# Patient Record
Sex: MALE | Race: WHITE | NOT HISPANIC OR LATINO | Employment: OTHER | URBAN - METROPOLITAN AREA
[De-identification: names, ages, dates, MRNs, and addresses within clinical notes are randomized per-mention and may not be internally consistent; named-entity substitution may affect disease eponyms.]

---

## 2021-05-25 ENCOUNTER — RECORDS - HEALTHEAST (OUTPATIENT)
Dept: ADMINISTRATIVE | Facility: CLINIC | Age: 71
End: 2021-05-25

## 2021-06-16 PROBLEM — F17.200 NICOTINE DEPENDENCE, UNCOMPLICATED: Status: ACTIVE | Noted: 2017-02-10

## 2021-06-16 PROBLEM — K85.20 ALCOHOL-INDUCED ACUTE PANCREATITIS WITHOUT INFECTION OR NECROSIS: Status: ACTIVE | Noted: 2017-02-10

## 2021-06-16 PROBLEM — G47.00 INSOMNIA: Status: ACTIVE | Noted: 2018-07-23

## 2021-06-16 PROBLEM — R94.31 ABNORMAL ELECTROCARDIOGRAM: Status: ACTIVE | Noted: 2017-02-10

## 2021-06-16 PROBLEM — F41.9 ANXIETY: Status: ACTIVE | Noted: 2018-07-23

## 2021-06-16 PROBLEM — F10.20 ALCOHOL USE DISORDER, SEVERE, DEPENDENCE (H): Status: ACTIVE | Noted: 2018-07-23

## 2021-07-14 PROBLEM — N17.9 AKI (ACUTE KIDNEY INJURY) (H): Status: RESOLVED | Noted: 2018-07-23 | Resolved: 2018-08-06

## 2021-08-03 PROBLEM — F10.951 ALCOHOL HALLUCINOSIS (H): Status: RESOLVED | Noted: 2018-07-23 | Resolved: 2018-08-06

## 2021-09-01 ENCOUNTER — TRANSFERRED RECORDS (OUTPATIENT)
Dept: HEALTH INFORMATION MANAGEMENT | Facility: CLINIC | Age: 71
End: 2021-09-01

## 2021-09-01 LAB — RETINOPATHY: NORMAL

## 2022-05-19 ENCOUNTER — APPOINTMENT (OUTPATIENT)
Dept: CT IMAGING | Facility: HOSPITAL | Age: 72
End: 2022-05-19
Attending: EMERGENCY MEDICINE
Payer: MEDICARE

## 2022-05-19 ENCOUNTER — TRANSFERRED RECORDS (OUTPATIENT)
Dept: HEALTH INFORMATION MANAGEMENT | Facility: CLINIC | Age: 72
End: 2022-05-19

## 2022-05-19 ENCOUNTER — HOSPITAL ENCOUNTER (OUTPATIENT)
Facility: HOSPITAL | Age: 72
Setting detail: OBSERVATION
Discharge: HOME | End: 2022-05-22
Attending: EMERGENCY MEDICINE | Admitting: INTERNAL MEDICINE
Payer: MEDICARE

## 2022-05-19 DIAGNOSIS — E87.1 HYPONATREMIA: ICD-10-CM

## 2022-05-19 DIAGNOSIS — R33.9 URINARY RETENTION: ICD-10-CM

## 2022-05-19 DIAGNOSIS — K92.2 GASTROINTESTINAL HEMORRHAGE, UNSPECIFIED GASTROINTESTINAL HEMORRHAGE TYPE: ICD-10-CM

## 2022-05-19 DIAGNOSIS — K86.9 DIABETES MELLITUS ASSOCIATED WITH PANCREATIC DISEASE (H): Primary | ICD-10-CM

## 2022-05-19 DIAGNOSIS — E11.69 DIABETES MELLITUS ASSOCIATED WITH PANCREATIC DISEASE (H): Primary | ICD-10-CM

## 2022-05-19 DIAGNOSIS — R55 SYNCOPE, UNSPECIFIED SYNCOPE TYPE: ICD-10-CM

## 2022-05-19 PROBLEM — E11.9 TYPE 2 DIABETES MELLITUS (H): Status: ACTIVE | Noted: 2017-06-03

## 2022-05-19 LAB
ABO/RH(D): NORMAL
ALBUMIN SERPL-MCNC: 3.9 G/DL (ref 3.5–5)
ALBUMIN UR-MCNC: NEGATIVE MG/DL
ALP SERPL-CCNC: 79 U/L (ref 45–120)
ALT SERPL W P-5'-P-CCNC: 12 U/L (ref 0–45)
ANION GAP SERPL CALCULATED.3IONS-SCNC: 11 MMOL/L (ref 5–18)
ANTIBODY SCREEN: NEGATIVE
APPEARANCE UR: CLEAR
APTT PPP: 32 SECONDS (ref 22–38)
AST SERPL W P-5'-P-CCNC: 20 U/L (ref 0–40)
BASOPHILS # BLD AUTO: 0 10E3/UL (ref 0–0.2)
BASOPHILS NFR BLD AUTO: 1 %
BILIRUB DIRECT SERPL-MCNC: 0.3 MG/DL
BILIRUB SERPL-MCNC: 0.6 MG/DL (ref 0–1)
BILIRUB UR QL STRIP: NEGATIVE
BNP SERPL-MCNC: 12 PG/ML (ref 0–67)
BUN SERPL-MCNC: 25 MG/DL (ref 8–28)
CALCIUM SERPL-MCNC: 9.8 MG/DL (ref 8.5–10.5)
CHLORIDE BLD-SCNC: 91 MMOL/L (ref 98–107)
CO2 SERPL-SCNC: 23 MMOL/L (ref 22–31)
COLOR UR AUTO: ABNORMAL
CREAT SERPL-MCNC: 1.54 MG/DL (ref 0.7–1.3)
EOSINOPHIL # BLD AUTO: 0.1 10E3/UL (ref 0–0.7)
EOSINOPHIL NFR BLD AUTO: 3 %
ERYTHROCYTE [DISTWIDTH] IN BLOOD BY AUTOMATED COUNT: 13.4 % (ref 10–15)
GFR SERPL CREATININE-BSD FRML MDRD: 48 ML/MIN/1.73M2
GLUCOSE BLD-MCNC: 109 MG/DL (ref 70–125)
GLUCOSE UR STRIP-MCNC: NEGATIVE MG/DL
HCT VFR BLD AUTO: 40.3 % (ref 40–53)
HEMOCCULT STL QL: POSITIVE
HGB BLD-MCNC: 14.4 G/DL (ref 13.3–17.7)
HGB UR QL STRIP: NEGATIVE
IMM GRANULOCYTES # BLD: 0 10E3/UL
IMM GRANULOCYTES NFR BLD: 1 %
INR PPP: 0.99 (ref 0.85–1.15)
KETONES UR STRIP-MCNC: NEGATIVE MG/DL
LACTATE SERPL-SCNC: 1.2 MMOL/L (ref 0.7–2)
LEUKOCYTE ESTERASE UR QL STRIP: NEGATIVE
LYMPHOCYTES # BLD AUTO: 1.3 10E3/UL (ref 0.8–5.3)
LYMPHOCYTES NFR BLD AUTO: 30 %
MCH RBC QN AUTO: 33 PG (ref 26.5–33)
MCHC RBC AUTO-ENTMCNC: 35.7 G/DL (ref 31.5–36.5)
MCV RBC AUTO: 92 FL (ref 78–100)
MONOCYTES # BLD AUTO: 0.6 10E3/UL (ref 0–1.3)
MONOCYTES NFR BLD AUTO: 14 %
MUCOUS THREADS #/AREA URNS LPF: PRESENT /LPF
NEUTROPHILS # BLD AUTO: 2.3 10E3/UL (ref 1.6–8.3)
NEUTROPHILS NFR BLD AUTO: 51 %
NITRATE UR QL: NEGATIVE
NRBC # BLD AUTO: 0 10E3/UL
NRBC BLD AUTO-RTO: 0 /100
PH UR STRIP: 5.5 [PH] (ref 5–7)
PLATELET # BLD AUTO: 289 10E3/UL (ref 150–450)
POTASSIUM BLD-SCNC: 4.8 MMOL/L (ref 3.5–5)
PROT SERPL-MCNC: 7.5 G/DL (ref 6–8)
RBC # BLD AUTO: 4.37 10E6/UL (ref 4.4–5.9)
RBC URINE: <1 /HPF
SODIUM SERPL-SCNC: 125 MMOL/L (ref 136–145)
SP GR UR STRIP: 1.01 (ref 1–1.03)
SPECIMEN EXPIRATION DATE: NORMAL
TROPONIN I SERPL-MCNC: <0.01 NG/ML (ref 0–0.29)
UROBILINOGEN UR STRIP-MCNC: <2 MG/DL
WBC # BLD AUTO: 4.3 10E3/UL (ref 4–11)
WBC URINE: 1 /HPF

## 2022-05-19 PROCEDURE — C9113 INJ PANTOPRAZOLE SODIUM, VIA: HCPCS | Performed by: EMERGENCY MEDICINE

## 2022-05-19 PROCEDURE — 93005 ELECTROCARDIOGRAM TRACING: CPT | Mod: XU | Performed by: EMERGENCY MEDICINE

## 2022-05-19 PROCEDURE — 99285 EMERGENCY DEPT VISIT HI MDM: CPT | Mod: 25

## 2022-05-19 PROCEDURE — 51798 US URINE CAPACITY MEASURE: CPT

## 2022-05-19 PROCEDURE — 99223 1ST HOSP IP/OBS HIGH 75: CPT | Performed by: HOSPITALIST

## 2022-05-19 PROCEDURE — 70496 CT ANGIOGRAPHY HEAD: CPT

## 2022-05-19 PROCEDURE — 85025 COMPLETE CBC W/AUTO DIFF WBC: CPT | Performed by: EMERGENCY MEDICINE

## 2022-05-19 PROCEDURE — 250N000011 HC RX IP 250 OP 636: Performed by: EMERGENCY MEDICINE

## 2022-05-19 PROCEDURE — 82550 ASSAY OF CK (CPK): CPT | Performed by: HOSPITALIST

## 2022-05-19 PROCEDURE — 72125 CT NECK SPINE W/O DYE: CPT

## 2022-05-19 PROCEDURE — 96361 HYDRATE IV INFUSION ADD-ON: CPT | Mod: XU

## 2022-05-19 PROCEDURE — 71275 CT ANGIOGRAPHY CHEST: CPT

## 2022-05-19 PROCEDURE — 36415 COLL VENOUS BLD VENIPUNCTURE: CPT | Performed by: EMERGENCY MEDICINE

## 2022-05-19 PROCEDURE — 84484 ASSAY OF TROPONIN QUANT: CPT | Performed by: EMERGENCY MEDICINE

## 2022-05-19 PROCEDURE — 250N000013 HC RX MED GY IP 250 OP 250 PS 637: Performed by: EMERGENCY MEDICINE

## 2022-05-19 PROCEDURE — 81001 URINALYSIS AUTO W/SCOPE: CPT | Performed by: EMERGENCY MEDICINE

## 2022-05-19 PROCEDURE — 84300 ASSAY OF URINE SODIUM: CPT | Performed by: HOSPITALIST

## 2022-05-19 PROCEDURE — C9803 HOPD COVID-19 SPEC COLLECT: HCPCS

## 2022-05-19 PROCEDURE — 86850 RBC ANTIBODY SCREEN: CPT | Performed by: EMERGENCY MEDICINE

## 2022-05-19 PROCEDURE — 999N000127 HC STATISTIC PERIPHERAL IV START W US GUIDANCE

## 2022-05-19 PROCEDURE — 96374 THER/PROPH/DIAG INJ IV PUSH: CPT

## 2022-05-19 PROCEDURE — 96375 TX/PRO/DX INJ NEW DRUG ADDON: CPT | Mod: XU

## 2022-05-19 PROCEDURE — 85730 THROMBOPLASTIN TIME PARTIAL: CPT | Performed by: EMERGENCY MEDICINE

## 2022-05-19 PROCEDURE — 82248 BILIRUBIN DIRECT: CPT | Performed by: EMERGENCY MEDICINE

## 2022-05-19 PROCEDURE — G0378 HOSPITAL OBSERVATION PER HR: HCPCS

## 2022-05-19 PROCEDURE — 83735 ASSAY OF MAGNESIUM: CPT | Performed by: HOSPITALIST

## 2022-05-19 PROCEDURE — 258N000003 HC RX IP 258 OP 636: Performed by: EMERGENCY MEDICINE

## 2022-05-19 PROCEDURE — 83935 ASSAY OF URINE OSMOLALITY: CPT | Performed by: HOSPITALIST

## 2022-05-19 PROCEDURE — 80053 COMPREHEN METABOLIC PANEL: CPT | Performed by: EMERGENCY MEDICINE

## 2022-05-19 PROCEDURE — 70498 CT ANGIOGRAPHY NECK: CPT

## 2022-05-19 PROCEDURE — 85610 PROTHROMBIN TIME: CPT | Performed by: EMERGENCY MEDICINE

## 2022-05-19 PROCEDURE — 120N000001 HC R&B MED SURG/OB

## 2022-05-19 PROCEDURE — 82272 OCCULT BLD FECES 1-3 TESTS: CPT | Performed by: EMERGENCY MEDICINE

## 2022-05-19 PROCEDURE — 87635 SARS-COV-2 COVID-19 AMP PRB: CPT | Performed by: EMERGENCY MEDICINE

## 2022-05-19 PROCEDURE — 83880 ASSAY OF NATRIURETIC PEPTIDE: CPT | Performed by: EMERGENCY MEDICINE

## 2022-05-19 PROCEDURE — 83605 ASSAY OF LACTIC ACID: CPT | Performed by: EMERGENCY MEDICINE

## 2022-05-19 PROCEDURE — 250N000011 HC RX IP 250 OP 636: Performed by: STUDENT IN AN ORGANIZED HEALTH CARE EDUCATION/TRAINING PROGRAM

## 2022-05-19 PROCEDURE — 51702 INSERT TEMP BLADDER CATH: CPT

## 2022-05-19 RX ORDER — ACETAMINOPHEN 650 MG/1
650 SUPPOSITORY RECTAL EVERY 6 HOURS PRN
Status: DISCONTINUED | OUTPATIENT
Start: 2022-05-19 | End: 2022-05-22 | Stop reason: HOSPADM

## 2022-05-19 RX ORDER — IOPAMIDOL 755 MG/ML
75 INJECTION, SOLUTION INTRAVASCULAR ONCE
Status: COMPLETED | OUTPATIENT
Start: 2022-05-19 | End: 2022-05-19

## 2022-05-19 RX ORDER — DEXTROSE MONOHYDRATE 25 G/50ML
25-50 INJECTION, SOLUTION INTRAVENOUS
Status: DISCONTINUED | OUTPATIENT
Start: 2022-05-19 | End: 2022-05-22 | Stop reason: HOSPADM

## 2022-05-19 RX ORDER — ONDANSETRON 4 MG/1
4 TABLET, ORALLY DISINTEGRATING ORAL EVERY 6 HOURS PRN
Status: DISCONTINUED | OUTPATIENT
Start: 2022-05-19 | End: 2022-05-22 | Stop reason: HOSPADM

## 2022-05-19 RX ORDER — MORPHINE SULFATE 4 MG/ML
4 INJECTION, SOLUTION INTRAMUSCULAR; INTRAVENOUS ONCE
Status: COMPLETED | OUTPATIENT
Start: 2022-05-19 | End: 2022-05-19

## 2022-05-19 RX ORDER — ALBUTEROL SULFATE 90 UG/1
2 AEROSOL, METERED RESPIRATORY (INHALATION) EVERY 4 HOURS PRN
COMMUNITY
End: 2022-07-08

## 2022-05-19 RX ORDER — ONDANSETRON 2 MG/ML
4 INJECTION INTRAMUSCULAR; INTRAVENOUS
Status: COMPLETED | OUTPATIENT
Start: 2022-05-19 | End: 2022-05-19

## 2022-05-19 RX ORDER — NICOTINE POLACRILEX 4 MG
15-30 LOZENGE BUCCAL
Status: DISCONTINUED | OUTPATIENT
Start: 2022-05-19 | End: 2022-05-22 | Stop reason: HOSPADM

## 2022-05-19 RX ORDER — ENOXAPARIN SODIUM 100 MG/ML
40 INJECTION SUBCUTANEOUS EVERY 24 HOURS
Status: DISCONTINUED | OUTPATIENT
Start: 2022-05-20 | End: 2022-05-20

## 2022-05-19 RX ORDER — SODIUM CHLORIDE 9 MG/ML
INJECTION, SOLUTION INTRAVENOUS CONTINUOUS
Status: ACTIVE | OUTPATIENT
Start: 2022-05-20 | End: 2022-05-20

## 2022-05-19 RX ORDER — DULOXETIN HYDROCHLORIDE 60 MG/1
120 CAPSULE, DELAYED RELEASE ORAL DAILY
COMMUNITY
Start: 2022-02-18 | End: 2023-03-22

## 2022-05-19 RX ORDER — HYDROXYZINE HYDROCHLORIDE 25 MG/1
25-50 TABLET, FILM COATED ORAL EVERY 8 HOURS PRN
COMMUNITY
Start: 2021-11-24 | End: 2022-06-20

## 2022-05-19 RX ORDER — DULOXETIN HYDROCHLORIDE 60 MG/1
120 CAPSULE, DELAYED RELEASE ORAL DAILY
Status: DISCONTINUED | OUTPATIENT
Start: 2022-05-20 | End: 2022-05-22 | Stop reason: HOSPADM

## 2022-05-19 RX ORDER — HYDROCODONE BITARTRATE AND ACETAMINOPHEN 5; 325 MG/1; MG/1
1 TABLET ORAL ONCE
Status: COMPLETED | OUTPATIENT
Start: 2022-05-19 | End: 2022-05-19

## 2022-05-19 RX ORDER — ONDANSETRON 2 MG/ML
4 INJECTION INTRAMUSCULAR; INTRAVENOUS EVERY 6 HOURS PRN
Status: DISCONTINUED | OUTPATIENT
Start: 2022-05-19 | End: 2022-05-22 | Stop reason: HOSPADM

## 2022-05-19 RX ORDER — HYDROXYZINE HYDROCHLORIDE 25 MG/1
25-50 TABLET, FILM COATED ORAL EVERY 8 HOURS PRN
Status: DISCONTINUED | OUTPATIENT
Start: 2022-05-19 | End: 2022-05-22 | Stop reason: HOSPADM

## 2022-05-19 RX ORDER — ATORVASTATIN CALCIUM 40 MG/1
40 TABLET, FILM COATED ORAL DAILY
Status: DISCONTINUED | OUTPATIENT
Start: 2022-05-20 | End: 2022-05-22 | Stop reason: HOSPADM

## 2022-05-19 RX ORDER — FAMOTIDINE 20 MG/1
20 TABLET, FILM COATED ORAL 2 TIMES DAILY
Status: DISCONTINUED | OUTPATIENT
Start: 2022-05-20 | End: 2022-05-22 | Stop reason: HOSPADM

## 2022-05-19 RX ORDER — ASPIRIN 81 MG/1
81 TABLET ORAL DAILY
Status: DISCONTINUED | OUTPATIENT
Start: 2022-05-20 | End: 2022-05-22 | Stop reason: HOSPADM

## 2022-05-19 RX ORDER — LIDOCAINE 40 MG/G
CREAM TOPICAL
Status: DISCONTINUED | OUTPATIENT
Start: 2022-05-19 | End: 2022-05-22 | Stop reason: HOSPADM

## 2022-05-19 RX ORDER — ACETAMINOPHEN 325 MG/1
650 TABLET ORAL EVERY 6 HOURS PRN
Status: DISCONTINUED | OUTPATIENT
Start: 2022-05-19 | End: 2022-05-22 | Stop reason: HOSPADM

## 2022-05-19 RX ORDER — ALBUTEROL SULFATE 90 UG/1
2 AEROSOL, METERED RESPIRATORY (INHALATION) EVERY 4 HOURS PRN
Status: DISCONTINUED | OUTPATIENT
Start: 2022-05-19 | End: 2022-05-22 | Stop reason: HOSPADM

## 2022-05-19 RX ADMIN — MORPHINE SULFATE 4 MG: 4 INJECTION INTRAVENOUS at 19:35

## 2022-05-19 RX ADMIN — HYDROCODONE BITARTRATE AND ACETAMINOPHEN 1 TABLET: 5; 325 TABLET ORAL at 23:41

## 2022-05-19 RX ADMIN — IOPAMIDOL 75 ML: 755 INJECTION, SOLUTION INTRAVENOUS at 21:04

## 2022-05-19 RX ADMIN — PANTOPRAZOLE SODIUM 40 MG: 40 INJECTION, POWDER, FOR SOLUTION INTRAVENOUS at 23:49

## 2022-05-19 RX ADMIN — SODIUM CHLORIDE, POTASSIUM CHLORIDE, SODIUM LACTATE AND CALCIUM CHLORIDE 1000 ML: 600; 310; 30; 20 INJECTION, SOLUTION INTRAVENOUS at 23:50

## 2022-05-19 RX ADMIN — ONDANSETRON 4 MG: 2 INJECTION INTRAMUSCULAR; INTRAVENOUS at 19:42

## 2022-05-19 NOTE — ED PROVIDER NOTES
"ED Provider In Triage Note  [facility]  Encounter Date: May 19, 2022    Chief Complaint   Patient presents with     Shortness of Breath     Syncope       Brief HPI:   Donovan Jane is a 71 year old male presenting to the Emergency Department presenting by EMS from [facility] with a chief complaint of lightheadedness and SOB x 10 days. SOB occurs with exertion; no chest pain. Had 2 syncopal episodes today.     Symptoms initially started with a severe headache - headache is still present, but has improved.     Reports chronic lower back pain.     Brief Physical Exam:  /70   Pulse 106   Temp 98  F (36.7  C) (Temporal)   Resp 20   Ht 1.727 m (5' 8\")   Wt 97.5 kg (215 lb)   SpO2 99%   BMI 32.69 kg/m    General: Non-toxic, but appears somewhat ill - skin mottled lower extremities  HEENT: Atraumatic  NECK: no midline tenderness  Resp: Coarse breath sounds diffusely; no audible wheezes or rales  Cardiac: Borderline tachycardic rate with regular rhythm  Abdomen: soft, non-tender  Neuro: Alert, oriented, answers questions appropriately; cranial nerves grossly intact, no focal motor deficits  Skin: mottled in appearance  Psych: Behavior appropriate      Plan Initiated in Triage:  EKG  Blood work  CTA chest  Head CT  Cervical spine CT    PIT Dispo:   Room asap    Niki Aviles MD on 5/19/2022 at 4:26 PM    Patient was evaluated by the Physician in Triage due to a limitation of available rooms in the Emergency Department. A plan of care was discussed based on the information obtained on the initial evaluation and patient was consuled to return back to the Emergency Department lobby after this initial evalutaiton until results were obtained or a room became available in the Emergency Department. Patient was counseled not to leave prior to receiving the results of their workup.        Niki Aviles MD  05/19/22 5555       Niki Aviles MD  05/19/22 8774    "

## 2022-05-19 NOTE — ED TRIAGE NOTES
Pt brought in by EMS from [facility]. He states that he has had shortness of breath on exertion for the last 10 days. He also reports headache and light-headedness. He also endorses two episodes of syncope this morning that were unwitnessed. He denies taking blood thinners. He denies chest pain.      Triage Assessment     Row Name 05/19/22 2262       Triage Assessment (Adult)    Airway WDL WDL       Respiratory WDL    Respiratory WDL rhythm/pattern    Rhythm/Pattern, Respiratory dyspnea on exertion       Skin Circulation/Temperature WDL    Skin Circulation/Temperature WDL WDL       Cardiac WDL    Cardiac WDL WDL       Peripheral/Neurovascular WDL    Peripheral Neurovascular WDL WDL       Cognitive/Neuro/Behavioral WDL    Cognitive/Neuro/Behavioral WDL WDL

## 2022-05-19 NOTE — ED PROVIDER NOTES
EMERGENCY DEPARTMENT NOTE     Name: Donovan Jane    Age/Sex: 71 year old male   MRN: [id number]   Evaluation Date & Time:  5/19/2022  5:00 PM    PCP:    Smitha Hameed   ED Provider: Smith Rod D.O.       CHIEF COMPLAINT    Shortness of Breath and Syncope       DIAGNOSIS & DISPOSITION     1. Syncope, unspecified syncope type    2. Hyponatremia    3. Urinary retention    4. Gastrointestinal hemorrhage, unspecified gastrointestinal hemorrhage type      DISPOSITION: [place] [place]    At the conclusion of the encounter I discussed the results of all of the tests and the disposition. The questions were answered. The patient or family acknowledged understanding and was agreeable with the care plan.    TOTAL CRITICAL CARE TIME (EXCLUDING PROCEDURES): Not applicable    PROCEDURES:   None    EMERGENCY DEPARTMENT COURSE/MEDICAL DECISION MAKING   5:14 PM I met with the patient to gather history and to perform my initial exam.  We discussed treatment options and the plan for care while in the Emergency Department.  11:02 PM Discussed with Dr. Sutherland, hospitalist, who accepts patient for admission.  11:07 PM Rechecked on patient. Discussed plan for admission - patient agreeable.    Donovan Jane is a 71 year old male with relevant past history of type II DM, HTN, who presents to the emergency department for evaluation of shortness of breath, syncope.  Triage note reviewed:  Pt brought in by EMS from [facility]. He states that he has had shortness of breath on exertion for the last 10 days. He also reports headache and light-headedness. He also endorses two episodes of syncope this morning that were unwitnessed. He denies taking blood thinners. He denies chest pain.      Triage Assessment     Row Name 05/19/22 1629       Triage Assessment (Adult)    Airway WDL WDL       Respiratory WDL    Respiratory WDL rhythm/pattern    Rhythm/Pattern, Respiratory dyspnea on exertion       Skin  "Circulation/Temperature WDL    Skin Circulation/Temperature WDL WDL       Cardiac WDL    Cardiac WDL WDL       Peripheral/Neurovascular WDL    Peripheral Neurovascular WDL WDL       Cognitive/Neuro/Behavioral WDL    Cognitive/Neuro/Behavioral WDL WDL                Differential  diagnosis  considered included but not limited to ACS, subarachnoid hemorrhage, cardiac arrhythmia, thoracic aortic dissection, pulmonary embolism, abdominal aortic aneurysm with leakage, GI bleeding ,symptomatic anemia, sepsis  Vital signs:BP 99/65   Pulse 81   Temp 98  F (36.7  C) (Temporal)   Resp 17   Ht 1.727 m (5' 8\")   Wt 97.5 kg (215 lb)   SpO2 94%   BMI 32.69 kg/m    Pertinent physical exam findings:  General: Alert normal mental status  Cardiac: Regular rate and rhythm S1-S2 without murmur rub  Abdomen: Soft nontender, positive bowel sounds.  No organomegaly or mass or pulsatile mass  Rectal: Dark stool guaiac positive  Skin: Rash on the right buttock consistent with herpes zoster.  No cellulitis  Diagnostic studies:  Imaging:  CTA Head Neck with Contrast   Final Result   IMPRESSION:    HEAD CT:   1.  No acute intracranial process.   2.  Moderate air-fluid level left maxillary sinus. Correlate for symptoms of acute sinusitis.      HEAD CTA:    1.  No stenosis/occlusion, aneurysm, or high flow vascular malformation.      NECK CTA:   1.  No measurable stenosis or dissection.      CT Chest Pulmonary Embolism w Contrast   Final Result   IMPRESSION:   1.  No evidence for pulmonary embolism.      Cervical spine CT w/o contrast   Final Result   IMPRESSION:   1.  No fracture or posttraumatic subluxation.   2.  Reversal of the usual cervical lordosis.   3.  Multilevel foraminal stenosis as detailed above..      Echocardiogram Complete    (Results Pending)     Lab:  Labs Ordered and Resulted from Time of ED Arrival to Time of ED Departure   BASIC METABOLIC PANEL - Abnormal       Result Value    Sodium 125 (*)     Potassium 4.8      " Chloride 91 (*)     Carbon Dioxide (CO2) 23      Anion Gap 11      Urea Nitrogen 25      Creatinine 1.54 (*)     Calcium 9.8      Glucose 109      GFR Estimate 48 (*)    CBC WITH PLATELETS AND DIFFERENTIAL - Abnormal    WBC Count 4.3      RBC Count 4.37 (*)     Hemoglobin 14.4      Hematocrit 40.3      MCV 92      MCH 33.0      MCHC 35.7      RDW 13.4      Platelet Count 289      % Neutrophils 51      % Lymphocytes 30      % Monocytes 14      % Eosinophils 3      % Basophils 1      % Immature Granulocytes 1      NRBCs per 100 WBC 0      Absolute Neutrophils 2.3      Absolute Lymphocytes 1.3      Absolute Monocytes 0.6      Absolute Eosinophils 0.1      Absolute Basophils 0.0      Absolute Immature Granulocytes 0.0      Absolute NRBCs 0.0     ROUTINE UA WITH MICROSCOPIC REFLEX TO CULTURE - Abnormal    Color Urine Light Yellow      Appearance Urine Clear      Glucose Urine Negative      Bilirubin Urine Negative      Ketones Urine Negative      Specific Gravity Urine 1.012      Blood Urine Negative      pH Urine 5.5      Protein Albumin Urine Negative      Urobilinogen Urine <2.0      Nitrite Urine Negative      Leukocyte Esterase Urine Negative      Mucus Urine Present (*)     RBC Urine <1      WBC Urine 1     OCCULT BLOOD STOOL - Abnormal    Occult Blood Positive (*)    TROPONIN I - Normal    Troponin I <0.01     INR - Normal    INR 0.99     PARTIAL THROMBOPLASTIN TIME - Normal    aPTT 32     LACTIC ACID WHOLE BLOOD - Normal    Lactic Acid 1.2     HEPATIC FUNCTION PANEL - Normal    Bilirubin Total 0.6      Bilirubin Direct 0.3      Protein Total 7.5      Albumin 3.9      Alkaline Phosphatase 79      AST 20      ALT 12     B-TYPE NATRIURETIC PEPTIDE ( EAST ONLY) - Normal    BNP 12     COVID-19 VIRUS (CORONAVIRUS) BY PCR   OSMOLALITY, RANDOM URINE   SODIUM RANDOM URINE   SODIUM   HEMOGLOBIN   TROPONIN I   MAGNESIUM   HEMOGLOBIN A1C   CK TOTAL   HEMOGLOBIN   TROPONIN I   BASIC METABOLIC PANEL   GLUCOSE MONITOR NURSING  POCT   GLUCOSE MONITOR NURSING POCT   GLUCOSE MONITOR NURSING POCT   GLUCOSE MONITOR NURSING POCT   TYPE AND SCREEN, ADULT   ENTERIC BACTERIA AND VIRUS PANEL BY LAWRENCE STOOL   ABO/RH TYPE AND SCREEN   EKG: Sinus rhythm. Low voltage QRS. Compared to most recent ECG from 7/23/2018, no significant change.  Interventions: IV fluids, Protonix  Medical decision making: Patient is remained hemodynamically stable without tachycardia.  He has had somewhat soft blood pressures with systolic blood pressure 98 with a MAP of 74.  I reviewed the CTA of the chest with the radiologist and aorta is visualized just of the infrarenal level with no  evidence of abdominal aortic aneurysm.  No arrhythmia has been noted on the monitor.  EKG is nonischemic initial troponin nonelevated.  No evidence of pulmonary embolism.  Patient is remained afebrile and WBC is nonelevated.  Current hemoglobin is 14.  Patient will be admitted to cardiac telemetry with continued IV fluid hydration, monitoring serial hemoglobins.  Case was discussed with the hospitalist Dr. Sutherland    ED INTERVENTIONS     Medications   albuterol (PROVENTIL HFA/VENTOLIN HFA) inhaler (has no administration in time range)   aspirin EC tablet 81 mg ( Oral Automatically Held 5/23/22 0800)   atorvastatin (LIPITOR) tablet 40 mg (has no administration in time range)   DULoxetine (CYMBALTA) DR capsule 120 mg (has no administration in time range)   hydrOXYzine (ATARAX) tablet 25-50 mg (has no administration in time range)   insulin glargine (LANTUS PEN) injection 15 Units (has no administration in time range)   lidocaine 1 % 0.1-1 mL (has no administration in time range)   lidocaine (LMX4) cream (has no administration in time range)   sodium chloride (PF) 0.9% PF flush 3 mL (3 mLs Intracatheter Not Given 5/19/22 8317)   sodium chloride (PF) 0.9% PF flush 3 mL (has no administration in time range)   melatonin tablet 1 mg (has no administration in time range)   glucose gel 15-30 g (has no  administration in time range)     Or   dextrose 50 % injection 25-50 mL (has no administration in time range)     Or   glucagon injection 1 mg (has no administration in time range)   menthol-zinc oxide (CALMOSEPTINE) 0.44-20.6 % ointment OINT (has no administration in time range)   enoxaparin ANTICOAGULANT (LOVENOX) injection 40 mg ( Subcutaneous Automatically Held 5/23/22 0800)   sodium chloride 0.9% infusion (has no administration in time range)   acetaminophen (TYLENOL) tablet 650 mg (has no administration in time range)     Or   acetaminophen (TYLENOL) Suppository 650 mg (has no administration in time range)   ondansetron (ZOFRAN ODT) ODT tab 4 mg (has no administration in time range)     Or   ondansetron (ZOFRAN) injection 4 mg (has no administration in time range)   famotidine (PEPCID) tablet 20 mg (has no administration in time range)   insulin aspart (NovoLOG) injection (RAPID ACTING) (has no administration in time range)   insulin aspart (NovoLOG) injection (RAPID ACTING) (has no administration in time range)   morphine (PF) injection 4 mg (4 mg Intravenous Given 5/19/22 1935)   ondansetron (ZOFRAN) injection 4 mg (4 mg Intravenous Given 5/19/22 1942)   iopamidol (ISOVUE-370) solution 75 mL (75 mLs Intravenous Given 5/19/22 2104)   iopamidol (ISOVUE-370) solution 75 mL (75 mLs Intravenous Given 5/19/22 2104)   HYDROcodone-acetaminophen (NORCO) 5-325 MG per tablet 1 tablet (1 tablet Oral Given 5/19/22 2341)   lactated ringers BOLUS 1,000 mL (1,000 mLs Intravenous New Bag 5/19/22 2350)   pantoprazole (PROTONIX) IV push injection 40 mg (40 mg Intravenous Given 5/19/22 2349)       DISCHARGE MEDICATIONS        Review of your medicines      UNREVIEWED medicines. Ask your doctor about these medicines      Dose / Directions   albuterol 108 (90 Base) MCG/ACT inhaler  Commonly known as: PROAIR HFA/PROVENTIL HFA/VENTOLIN HFA      Dose: 2 puff  Inhale 2 puffs into the lungs every 4 hours as needed for shortness of  breath / dyspnea or wheezing  Refills: 0     aspirin 81 MG EC tablet  Commonly known as: ASA      Dose: 81 mg  [ASPIRIN 81 MG EC TABLET] Take 81 mg by mouth daily.  Refills: 0     atorvastatin 40 MG tablet  Commonly known as: LIPITOR      Dose: 40 mg  [ATORVASTATIN (LIPITOR) 40 MG TABLET] Take 40 mg by mouth daily.  Refills: 0     DULoxetine 60 MG capsule  Commonly known as: CYMBALTA  Ask about: Which instructions should I use?      Dose: 120 mg  Take 120 mg by mouth daily  Refills: 0     hydrOXYzine 25 MG tablet  Commonly known as: ATARAX      Dose: 25-50 mg  Take 25-50 mg by mouth every 8 hours as needed for itching  Refills: 0     insulin glargine 100 UNIT/ML pen  Commonly known as: LANTUS PEN      Dose: 15 Units  [INSULIN GLARGINE (LANTUS; BASAGLAR) 100 UNIT/ML (3 ML) PEN] Inject 15 Units under the skin every morning.  Refills: 0     lisinopril 10 MG tablet  Commonly known as: ZESTRIL      Dose: 10 mg  [LISINOPRIL (PRINIVIL,ZESTRIL) 10 MG TABLET] Take 10 mg by mouth daily.  Refills: 0            INFORMATION SOURCE AND LIMITATIONS    History/Exam limitations: None  Patient information was obtained from: Patient  Use of : N/A    HISTORY OF PRESENT ILLNESS   Donovan Jane is a 71 year old male with a relevant past history of type II DM, HTN, who presents to this ED by EMS for evaluation of shortness of breath, syncope. Patient reports he began feeling ill 9 days ago (5/10/22) with a severe headache, shortness of breath, and dizziness which he describes as feeling off balance. He reports his headache has been constant over the last 9 days, but improved in severity. Currently describes it as a dull pain. Today, patient reports he got up from his chair to get coffee and when he near the table had an unwitnessed syncopal episode. States he came to on the floor and his cat was licking the blood off the floor, noting he his his face/nose on the floor when he fell. He denies biting his tongue or any urinary or  bowel incontinence. Patient complains of neck stiffness, but states that has been ongoing prior to his syncopal episode and fall today.    Patient reports his shortness of breath is with exertion. No orthopnea. Denies any chest pain or leg swelling. He endorses mild nausea, but no vomiting or diarrhea. No black or bloody stools. Patient also endorses difficulty urinating. States that he just has dribbles of urine when he urinates.    He complains of a Shingles rash around his buttocks.    Patient denies any cardiac history. Denies any other concerns.    REVIEW OF SYSTEMS:   Constitutional: Negative for fever.   HENT: Negative for URI symptoms or sore throat. Positive for head/face injury.  Eyes: Negative for visual disturbance.   Cardiac: Negative for chest pain, palpitations, or leg swelling.  Respiratory: Negative for cough, orthopnea. Positive for shortness of breath (with exertion).   Gastrointestinal: Negative for bowel incontinence, abdominal pain, vomiting, constipation, diarrhea, rectal bleeding or melena. Positive for nausea.  Genitourinary: Negative for urinary incontinence, dysuria, flank pain and hematuria. Positive for difficulty urinating.  Musculoskeletal: Negative for back pain. Positive for neck stiffness.  Skin: Positive for shingles rash around buttocks.  Neurological: Negative for speech difficulty, unilateral weakness. Positive for headache, dizziness, syncope.  Hematological: Negative for adenopathy. Does not bruise/bleed easily.   Psychiatric/Behavioral: Negative for confusion.       PATIENT HISTORY   History reviewed. No pertinent past medical history.  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)     HTN (hypertension)     Insomnia     Nicotine dependence, uncomplicated      Obesity (BMI 30-39.9)     Type 2 diabetes mellitus (H)     Hyponatremia     Syncope, unspecified syncope type     Urinary retention     Past Surgical History:   Procedure Laterality Date     BACK SURGERY       Social Histrory  Smoking: Former smoker  Alcohol Use: Minimal currently 1-2 drinks per month  No Known Allergies    OUTPATIENT MEDICATIONS     New Prescriptions    No medications on file      Vitals:    05/19/22 2015 05/19/22 2045 05/19/22 2230 05/20/22 0000   BP: 121/75 98/81 98/64 99/65   Pulse: 89 104 85 81   Resp: 15 24  17   Temp:       TempSrc:       SpO2: 97% 97% 95% 94%   Weight:       Height:           Physical Exam   Constitutional: Oriented to person, place, and time. Appears well-developed and well-nourished.   HEENT:    Head: Atraumatic.   Neck: Normal range of motion. Neck supple.   Cardiovascular: Normal rate, regular rhythm and normal heart sounds.    Pulmonary/Chest: Normal effort  and breath sounds normal.   Abdominal: Soft. Bowel sounds are normal.   Rectal: Black stool on rectal exam.  Musculoskeletal: Normal range of motion.   Neurological: Alert and oriented to person, place, and time. Normal strength. No sensory deficit. No cranial nerve deficit.  Skin: Skin is warm and dry. Granulating rash along right buttock.  Psychiatric: Normal mood and affect. Behavior is normal. Thought content normal.      DIAGNOSTICS    LABORATORY FINDINGS (REVIEWED AND INTERPRETED):  Labs Ordered and Resulted from Time of ED Arrival to Time of ED Departure   BASIC METABOLIC PANEL - Abnormal       Result Value    Sodium 125 (*)     Potassium 4.8      Chloride 91 (*)     Carbon Dioxide (CO2) 23      Anion Gap 11      Urea Nitrogen 25      Creatinine 1.54 (*)     Calcium 9.8      Glucose 109      GFR Estimate 48 (*)    CBC WITH PLATELETS AND DIFFERENTIAL - Abnormal    WBC Count 4.3      RBC Count 4.37 (*)     Hemoglobin 14.4      Hematocrit 40.3      MCV 92      MCH 33.0      MCHC 35.7      RDW 13.4      Platelet  Count 289      % Neutrophils 51      % Lymphocytes 30      % Monocytes 14      % Eosinophils 3      % Basophils 1      % Immature Granulocytes 1      NRBCs per 100 WBC 0      Absolute Neutrophils 2.3      Absolute Lymphocytes 1.3      Absolute Monocytes 0.6      Absolute Eosinophils 0.1      Absolute Basophils 0.0      Absolute Immature Granulocytes 0.0      Absolute NRBCs 0.0     ROUTINE UA WITH MICROSCOPIC REFLEX TO CULTURE - Abnormal    Color Urine Light Yellow      Appearance Urine Clear      Glucose Urine Negative      Bilirubin Urine Negative      Ketones Urine Negative      Specific Gravity Urine 1.012      Blood Urine Negative      pH Urine 5.5      Protein Albumin Urine Negative      Urobilinogen Urine <2.0      Nitrite Urine Negative      Leukocyte Esterase Urine Negative      Mucus Urine Present (*)     RBC Urine <1      WBC Urine 1     OCCULT BLOOD STOOL - Abnormal    Occult Blood Positive (*)    TROPONIN I - Normal    Troponin I <0.01     INR - Normal    INR 0.99     PARTIAL THROMBOPLASTIN TIME - Normal    aPTT 32     LACTIC ACID WHOLE BLOOD - Normal    Lactic Acid 1.2     HEPATIC FUNCTION PANEL - Normal    Bilirubin Total 0.6      Bilirubin Direct 0.3      Protein Total 7.5      Albumin 3.9      Alkaline Phosphatase 79      AST 20      ALT 12     B-TYPE NATRIURETIC PEPTIDE ( EAST ONLY) - Normal    BNP 12     COVID-19 VIRUS (CORONAVIRUS) BY PCR   OSMOLALITY, RANDOM URINE   SODIUM RANDOM URINE   SODIUM   HEMOGLOBIN   TROPONIN I   MAGNESIUM   HEMOGLOBIN A1C   CK TOTAL   HEMOGLOBIN   TROPONIN I   BASIC METABOLIC PANEL   GLUCOSE MONITOR NURSING POCT   GLUCOSE MONITOR NURSING POCT   GLUCOSE MONITOR NURSING POCT   GLUCOSE MONITOR NURSING POCT   TYPE AND SCREEN, ADULT   ENTERIC BACTERIA AND VIRUS PANEL BY [facility]WRENCE STOOL   ABO/RH TYPE AND SCREEN       IMAGING (REVIEWED AND INTERPRETED):  CTA Head Neck with Contrast   Final Result   IMPRESSION:    HEAD CT:   1.  No acute intracranial process.   2.  Moderate  air-fluid level left maxillary sinus. Correlate for symptoms of acute sinusitis.      HEAD CTA:    1.  No stenosis/occlusion, aneurysm, or high flow vascular malformation.      NECK CTA:   1.  No measurable stenosis or dissection.      CT Chest Pulmonary Embolism w Contrast   Final Result   IMPRESSION:   1.  No evidence for pulmonary embolism.      Cervical spine CT w/o contrast   Final Result   IMPRESSION:   1.  No fracture or posttraumatic subluxation.   2.  Reversal of the usual cervical lordosis.   3.  Multilevel foraminal stenosis as detailed above..      Echocardiogram Complete    (Results Pending)       ECG (REVIEWED AND INTERPRETED):   ECG:   Performed at: 16:38  HR:  99 bpm  Rhythm: Sinus  Axis: 28, 10, 48  QRS duration: 74  QTC: 410  ST changes: No ST segment elevation or depression, no T wave inversion,No Q wave  Interpretation: Sinus rhythm. Low voltage QRS.  Compared to most recent ECG from 7/23/2018, no significant change.    I have reviewed the patient's ECG, with comments made as listed above. Please see scanned image for full interpretation.       I, Bogdan Dale, am serving as a scribe to document services personally performed by Smith Rod D.O., based on my observation and the provider s statements to me.    I, Smith Rod D.O., attest that Bogdan Dale is acting in a scribe capacity, has observed my performance of the services and has documented them in accordance with my direction.    Smith Rod D.O.  EMERGENCY MEDICINE   05/19/22  [facility] EMERGENCY DEPARTMENT  [address] [place] [id number] [phone]  Dept: [phone]     Smith Rod,   05/20/22 0041

## 2022-05-20 ENCOUNTER — APPOINTMENT (OUTPATIENT)
Dept: CARDIOLOGY | Facility: HOSPITAL | Age: 72
End: 2022-05-20
Attending: HOSPITALIST
Payer: MEDICARE

## 2022-05-20 ENCOUNTER — APPOINTMENT (OUTPATIENT)
Dept: ULTRASOUND IMAGING | Facility: HOSPITAL | Age: 72
End: 2022-05-20
Attending: EMERGENCY MEDICINE
Payer: MEDICARE

## 2022-05-20 PROBLEM — I10 HTN (HYPERTENSION): Status: ACTIVE | Noted: 2018-07-23

## 2022-05-20 PROBLEM — K92.2 GASTROINTESTINAL HEMORRHAGE, UNSPECIFIED GASTROINTESTINAL HEMORRHAGE TYPE: Status: ACTIVE | Noted: 2022-05-20

## 2022-05-20 LAB
ANION GAP SERPL CALCULATED.3IONS-SCNC: 7 MMOL/L (ref 5–18)
ATRIAL RATE - MUSE: 78 BPM
BASOPHILS # BLD AUTO: 0 10E3/UL (ref 0–0.2)
BASOPHILS NFR BLD AUTO: 1 %
BUN SERPL-MCNC: 18 MG/DL (ref 8–28)
CALCIUM SERPL-MCNC: 9 MG/DL (ref 8.5–10.5)
CHLORIDE BLD-SCNC: 99 MMOL/L (ref 98–107)
CK SERPL-CCNC: 63 U/L (ref 30–190)
CO2 SERPL-SCNC: 21 MMOL/L (ref 22–31)
CREAT SERPL-MCNC: 1.27 MG/DL (ref 0.7–1.3)
DIASTOLIC BLOOD PRESSURE - MUSE: 65 MMHG
EOSINOPHIL # BLD AUTO: 0.1 10E3/UL (ref 0–0.7)
EOSINOPHIL NFR BLD AUTO: 4 %
ERYTHROCYTE [DISTWIDTH] IN BLOOD BY AUTOMATED COUNT: 13.4 % (ref 10–15)
GFR SERPL CREATININE-BSD FRML MDRD: 61 ML/MIN/1.73M2
GLUCOSE BLD-MCNC: 119 MG/DL (ref 70–125)
GLUCOSE BLDC GLUCOMTR-MCNC: 102 MG/DL (ref 70–99)
GLUCOSE BLDC GLUCOMTR-MCNC: 123 MG/DL (ref 70–99)
GLUCOSE BLDC GLUCOMTR-MCNC: 70 MG/DL (ref 70–99)
GLUCOSE BLDC GLUCOMTR-MCNC: 97 MG/DL (ref 70–99)
HBA1C MFR BLD: 6.5 %
HCT VFR BLD AUTO: 35.2 % (ref 40–53)
HGB BLD-MCNC: 12.2 G/DL (ref 13.3–17.7)
HGB BLD-MCNC: 12.9 G/DL (ref 13.3–17.7)
HGB BLD-MCNC: 13.6 G/DL (ref 13.3–17.7)
IMM GRANULOCYTES # BLD: 0 10E3/UL
IMM GRANULOCYTES NFR BLD: 1 %
INTERPRETATION ECG - MUSE: NORMAL
LVEF ECHO: NORMAL
LYMPHOCYTES # BLD AUTO: 1.1 10E3/UL (ref 0.8–5.3)
LYMPHOCYTES NFR BLD AUTO: 31 %
MAGNESIUM SERPL-MCNC: 1.6 MG/DL (ref 1.8–2.6)
MAGNESIUM SERPL-MCNC: 1.7 MG/DL (ref 1.8–2.6)
MCH RBC QN AUTO: 32.8 PG (ref 26.5–33)
MCHC RBC AUTO-ENTMCNC: 34.7 G/DL (ref 31.5–36.5)
MCV RBC AUTO: 95 FL (ref 78–100)
MONOCYTES # BLD AUTO: 0.5 10E3/UL (ref 0–1.3)
MONOCYTES NFR BLD AUTO: 14 %
NEUTROPHILS # BLD AUTO: 1.8 10E3/UL (ref 1.6–8.3)
NEUTROPHILS NFR BLD AUTO: 49 %
NRBC # BLD AUTO: 0 10E3/UL
NRBC BLD AUTO-RTO: 0 /100
OSMOLALITY UR: 286 MMOL/KG (ref 100–1200)
P AXIS - MUSE: 49 DEGREES
PLATELET # BLD AUTO: 259 10E3/UL (ref 150–450)
POTASSIUM BLD-SCNC: 5.3 MMOL/L (ref 3.5–5)
PR INTERVAL - MUSE: 160 MS
QRS DURATION - MUSE: 80 MS
QT - MUSE: 366 MS
QTC - MUSE: 417 MS
R AXIS - MUSE: 38 DEGREES
RBC # BLD AUTO: 3.72 10E6/UL (ref 4.4–5.9)
SARS-COV-2 RNA RESP QL NAA+PROBE: NEGATIVE
SODIUM SERPL-SCNC: 126 MMOL/L (ref 136–145)
SODIUM SERPL-SCNC: 127 MMOL/L (ref 136–145)
SODIUM SERPL-SCNC: 128 MMOL/L (ref 136–145)
SODIUM UR-SCNC: 20 MMOL/L
SYSTOLIC BLOOD PRESSURE - MUSE: 92 MMHG
T AXIS - MUSE: 66 DEGREES
TROPONIN I SERPL-MCNC: 0.02 NG/ML (ref 0–0.29)
TROPONIN I SERPL-MCNC: <0.01 NG/ML (ref 0–0.29)
VENTRICULAR RATE- MUSE: 78 BPM
WBC # BLD AUTO: 3.6 10E3/UL (ref 4–11)

## 2022-05-20 PROCEDURE — 85025 COMPLETE CBC W/AUTO DIFF WBC: CPT | Performed by: HOSPITALIST

## 2022-05-20 PROCEDURE — 83036 HEMOGLOBIN GLYCOSYLATED A1C: CPT | Performed by: HOSPITALIST

## 2022-05-20 PROCEDURE — 82310 ASSAY OF CALCIUM: CPT | Performed by: HOSPITALIST

## 2022-05-20 PROCEDURE — 250N000013 HC RX MED GY IP 250 OP 250 PS 637: Performed by: HOSPITALIST

## 2022-05-20 PROCEDURE — 85018 HEMOGLOBIN: CPT | Mod: 91 | Performed by: INTERNAL MEDICINE

## 2022-05-20 PROCEDURE — 84295 ASSAY OF SERUM SODIUM: CPT | Performed by: HOSPITALIST

## 2022-05-20 PROCEDURE — 84484 ASSAY OF TROPONIN QUANT: CPT | Performed by: HOSPITALIST

## 2022-05-20 PROCEDURE — 93306 TTE W/DOPPLER COMPLETE: CPT | Mod: 26 | Performed by: INTERNAL MEDICINE

## 2022-05-20 PROCEDURE — 999N000208 ECHOCARDIOGRAM COMPLETE

## 2022-05-20 PROCEDURE — 96372 THER/PROPH/DIAG INJ SC/IM: CPT | Mod: XU | Performed by: INTERNAL MEDICINE

## 2022-05-20 PROCEDURE — G0378 HOSPITAL OBSERVATION PER HR: HCPCS

## 2022-05-20 PROCEDURE — 96361 HYDRATE IV INFUSION ADD-ON: CPT

## 2022-05-20 PROCEDURE — 255N000002 HC RX 255 OP 636: Performed by: INTERNAL MEDICINE

## 2022-05-20 PROCEDURE — 250N000012 HC RX MED GY IP 250 OP 636 PS 637: Performed by: HOSPITALIST

## 2022-05-20 PROCEDURE — 250N000011 HC RX IP 250 OP 636: Performed by: INTERNAL MEDICINE

## 2022-05-20 PROCEDURE — 36415 COLL VENOUS BLD VENIPUNCTURE: CPT | Performed by: HOSPITALIST

## 2022-05-20 PROCEDURE — 83735 ASSAY OF MAGNESIUM: CPT | Performed by: HOSPITALIST

## 2022-05-20 PROCEDURE — 96372 THER/PROPH/DIAG INJ SC/IM: CPT | Mod: XU | Performed by: HOSPITALIST

## 2022-05-20 PROCEDURE — 258N000003 HC RX IP 258 OP 636: Performed by: HOSPITALIST

## 2022-05-20 PROCEDURE — 250N000013 HC RX MED GY IP 250 OP 250 PS 637: Performed by: INTERNAL MEDICINE

## 2022-05-20 PROCEDURE — 76775 US EXAM ABDO BACK WALL LIM: CPT

## 2022-05-20 PROCEDURE — 99226 PR SUBSEQUENT OBSERVATION CARE,LEVEL III: CPT | Performed by: INTERNAL MEDICINE

## 2022-05-20 PROCEDURE — 82962 GLUCOSE BLOOD TEST: CPT | Mod: 91

## 2022-05-20 PROCEDURE — 96375 TX/PRO/DX INJ NEW DRUG ADDON: CPT

## 2022-05-20 PROCEDURE — 96361 HYDRATE IV INFUSION ADD-ON: CPT | Mod: XU

## 2022-05-20 PROCEDURE — 85018 HEMOGLOBIN: CPT | Performed by: HOSPITALIST

## 2022-05-20 PROCEDURE — 36415 COLL VENOUS BLD VENIPUNCTURE: CPT | Performed by: INTERNAL MEDICINE

## 2022-05-20 PROCEDURE — 84484 ASSAY OF TROPONIN QUANT: CPT | Mod: 59 | Performed by: HOSPITALIST

## 2022-05-20 RX ORDER — ENOXAPARIN SODIUM 100 MG/ML
40 INJECTION SUBCUTANEOUS EVERY 24 HOURS
Status: DISCONTINUED | OUTPATIENT
Start: 2022-05-20 | End: 2022-05-22 | Stop reason: HOSPADM

## 2022-05-20 RX ORDER — MAGNESIUM OXIDE 400 MG/1
400 TABLET ORAL 2 TIMES DAILY
Status: COMPLETED | OUTPATIENT
Start: 2022-05-20 | End: 2022-05-21

## 2022-05-20 RX ORDER — NICOTINE 21 MG/24HR
1 PATCH, TRANSDERMAL 24 HOURS TRANSDERMAL DAILY
Status: DISCONTINUED | OUTPATIENT
Start: 2022-05-20 | End: 2022-05-22 | Stop reason: HOSPADM

## 2022-05-20 RX ADMIN — Medication 400 MG: at 07:39

## 2022-05-20 RX ADMIN — ATORVASTATIN CALCIUM 40 MG: 40 TABLET, FILM COATED ORAL at 07:39

## 2022-05-20 RX ADMIN — SODIUM CHLORIDE: 9 INJECTION, SOLUTION INTRAVENOUS at 02:10

## 2022-05-20 RX ADMIN — Medication 400 MG: at 03:06

## 2022-05-20 RX ADMIN — SODIUM CHLORIDE: 9 INJECTION, SOLUTION INTRAVENOUS at 18:37

## 2022-05-20 RX ADMIN — SODIUM CHLORIDE: 9 INJECTION, SOLUTION INTRAVENOUS at 11:58

## 2022-05-20 RX ADMIN — FAMOTIDINE 20 MG: 20 TABLET ORAL at 07:39

## 2022-05-20 RX ADMIN — FAMOTIDINE 20 MG: 20 TABLET ORAL at 19:55

## 2022-05-20 RX ADMIN — DULOXETINE HYDROCHLORIDE 120 MG: 60 CAPSULE, DELAYED RELEASE PELLETS ORAL at 08:44

## 2022-05-20 RX ADMIN — HYDROXYZINE HYDROCHLORIDE 25 MG: 25 TABLET, FILM COATED ORAL at 16:51

## 2022-05-20 RX ADMIN — HYDROXYZINE HYDROCHLORIDE 25 MG: 25 TABLET, FILM COATED ORAL at 07:39

## 2022-05-20 RX ADMIN — ENOXAPARIN SODIUM 40 MG: 40 INJECTION SUBCUTANEOUS at 18:32

## 2022-05-20 RX ADMIN — PERFLUTREN: 6.52 INJECTION, SUSPENSION INTRAVENOUS at 11:10

## 2022-05-20 RX ADMIN — ACETAMINOPHEN 650 MG: 325 TABLET ORAL at 11:57

## 2022-05-20 RX ADMIN — ACETAMINOPHEN 650 MG: 325 TABLET ORAL at 16:51

## 2022-05-20 RX ADMIN — NICOTINE 1 PATCH: 21 PATCH, EXTENDED RELEASE TRANSDERMAL at 19:54

## 2022-05-20 RX ADMIN — Medication 400 MG: at 19:55

## 2022-05-20 RX ADMIN — INSULIN GLARGINE 15 UNITS: 100 INJECTION, SOLUTION SUBCUTANEOUS at 07:45

## 2022-05-20 ASSESSMENT — ACTIVITIES OF DAILY LIVING (ADL)
ADLS_ACUITY_SCORE: 35

## 2022-05-20 NOTE — PLAN OF CARE
PRIMARY DIAGNOSIS: GASTROENTERITIS    OUTPATIENT/OBSERVATION GOALS TO BE MET BEFORE DISCHARGE  1. Orthostatic performed: No    2. Tolerating PO fluid and/or antibiotics (if applicable): Yes    3. Nausea/Vomiting/Diarrhea symptoms improved: Yes    4. Pain status: Pain free.    5. Return to near baseline physical activity: Yes    Discharge Planner Nurse   Safe discharge environment identified: Yes  Barriers to discharge: Yes       Entered by: Xiomara Lau RN 05/20/2022 6:58 PM     Please review provider order for any additional goals.   Nurse to notify provider when observation goals have been met and patient is ready for discharge.Goal Outcome Evaluation:

## 2022-05-20 NOTE — UTILIZATION REVIEW
"Admission Status; Secondary Review Determination         Under the authority of the Utilization Management Committee, the utilization review process indicated a secondary review on the above patient.  The review outcome is based on review of the medical records, discussions with staff, and applying clinical experience noted on the date of the review.          (x) Observation Status Appropriate - This patient does not meet hospital inpatient criteria and is placed in observation status. If this patient's primary payer is Medicare and was admitted as an inpatient, Condition Code 44 should be used and patient status changed to \"observation\".      RATIONALE FOR DETERMINATION     Mr. Jane is a 72 yo male pt with a PMH of continued tobacco abuse who presented with N/V/D and syncopal episode.  He was noted to have SHABANA and hyponatremia; creat has normalized and sodium level improving with IVF. ECHO today without any concerning abnromalites and no arrythmias on tele. He is not orthostatic today; GI symptoms have improved and he is tolerating a regular diet.  Hernández was placed for urinary retention.  He is clinically improved and will likely be able to discharge home later today or in the am    The severity of illness, intensity of service provided, expected LOS and risk for adverse outcome make the care appropriate for further observation; however, doesn't meet criteria for hospital inpatient admission. Dr Garcia notified of this determination and is in agreement.        The information on this document is developed by the utilization review team in order for the business office to ensure compliance.  This only denotes the appropriateness of proper admission status and does not reflect the quality of care rendered.         The definitions of Inpatient Status and Observation Status used in making the determination above are those provided in the CMS Coverage Manual, Chapter 1 and Chapter 6, section 70.4.    "     Sincerely,    Alexa Rodriguez, DO  Utilization Review  Physician Advisor  [facility]

## 2022-05-20 NOTE — H&P
[facility]    History and Physical - Hospitalist Service       Date of Admission:  5/19/2022    Assessment & Plan      Donovan Jane is a 71 year old male admitted on 5/19/2022. He was sent to the ED from the urgent care for evaluation of syncope, nausea, vomiting, diarrhea, dyspnea on exertion, headache, lightheadedness.    1.  Hyponatremia  Likely secondary to GI losses  Check urine sodium, urine osmolality  IV hydration  Monitor sodium level    2.  Acute kidney injury  Secondary to dehydration  IV hydration  Hold lisinopril    3.  Syncope  Likely secondary to orthostatic hypotension in the setting of dehydration  Telemetry monitoring  Serial enzymes  Echocardiogram to evaluate structure and function    4.  Urinary retention  Symptoms of difficulty urinating and incontinent dribbling  Hernández catheter inserted, 1 L drained  Will need outpatient urology follow-up  Consider Flomax once blood pressure is stable    5.  Right buttock shingles  Lesions are crusted over  Local skin care    6.  Dyspnea on exertion  Initially some productive cough and felt febrile  Heavy tobacco use  CT chest showed no pulmonary mass, consolidation or suspicious pulmonary nodule; no pleural effusion or pneumothorax  Continue albuterol as needed  Cardiac work-up as above    7.  Acute gastroenteritis  Likely viral illness  Check enteric bacteria and viral panel  Dark stool reported, positive occult blood  Hemoglobin is at baseline, will expect some degree of hemodilution on serial checks    8.  Type 2 diabetes mellitus with long-term insulin use  Continue Lantus 15 units in a.m.  Monitor with insulin sliding scale and hypoglycemia protocol    9.  Essential hypertension  Soft blood pressures while in the ED  Hold lisinopril for now    10.  Depression, anxiety  Continue PTA duloxetine    11.  Tobacco abuse  Cessation education  Patient declined a nicotine patch at this time     Diet: Combination Diet Moderate Consistent  "Carb (60 g CHO per Meal) Diet; Low Saturated Fat Na <2400mg Diet    DVT Prophylaxis: Pneumatic Compression Devices, hold pharmacologic prophylaxis while monitoring hemoglobin  Hernández Catheter: PRESENT, indication: Retention  Central Lines: None  Cardiac Monitoring: ACTIVE order. Indication: Syncope- low cardiac risk (24 hours)  Code Status: Full Code      Clinically Significant Risk Factors Present on Admission         # Hyponatremia: Na = 125 mmol/L (Ref range: 136 - 145 mmol/L) on admission, will monitor as appropriate        # Platelet Defect: home medication list includes an antiplatelet medication   # Obesity: Estimated body mass index is 32.69 kg/m  as calculated from the following:    Height as of this encounter: 1.727 m (5' 8\").    Weight as of this encounter: 97.5 kg (215 lb).      Disposition Plan   Expected Discharge:  after at least 2 midnights  Anticipated discharge location:  Awaiting care coordination huddle  Delays:    Hyponatremia, SHABANA, syncope       The patient's care was discussed with the Patient.    Roland Sutherland MD  Hospitalist Service  [facility]  Securely message with the Vocera Web Console (learn more here)  Text page via [facility] Paging/Directory         ______________________________________________________________________    Chief Complaint   Headache, lightheadedness, dyspnea on exertion, nausea, vomiting, diarrhea, difficulty urinating    History is obtained from the patient, electronic health record and emergency department physician    History of Present Illness   Donovan Jane is a 71 year old male who was sent to the ED from the urgent care for evaluation of above chief complaint.  Past medical history of hypertension, type 2 diabetes, degenerative disc disease, chronic low back pain, anxiety.  Over the last 10 days patient has had a number of symptoms including dyspnea on exertion and initially felt febrile with a productive cough.  He denied sore throat, " nasal congestion, chest pain or palpitations.  He reports the last COVID vaccine booster was a few months ago.  In addition, he has had some nausea, vomiting and diarrhea.  The stool is dark but denies abiodun blood and also denied abdominal pain.  He lives with his son and denies sick contacts or recent traveling.  Over the last week, he developed a painful rash on his right buttock.  He described persistent dyspnea on exertion with lightheadedness and occasional headache.  Today, he had to lower himself to his knees while in the kitchen and closed his eyes but does not think that he passed out.  Later on, he woke up on the floor and had blood on his nose.  He describes a new onset of difficulty urinating with some incontinent dribbling while sitting on the couch.  Given all of the symptoms he was sent to the ED for further evaluation.  Patient smokes less than 1 pack of cigarettes per day.  He drinks beer occasionally with last drink 2.5 weeks ago.    Review of Systems    The 10 point Review of Systems is negative other than noted in the HPI or here.     Past Medical History    I have reviewed this patient's medical history and updated it with pertinent information if needed.   History reviewed. No pertinent past medical history.    Past Surgical History   I have reviewed this patient's surgical history and updated it with pertinent information if needed.  Past Surgical History:   Procedure Laterality Date     BACK SURGERY         Social History   I have reviewed this patient's social history and updated it with pertinent information if needed.  Social History     Tobacco Use     Smoking status: Former Smoker     Packs/day: 1.50     Years: 15.00     Pack years: 22.50     Quit date: 7/23/2018     Years since quitting: 3.8     Smokeless tobacco: Never Used   Substance Use Topics     Alcohol use: Yes     Drug use: Yes     Types: Marijuana       Family History     Family history reviewed and noncontributory to this  hospitalization    Prior to Admission Medications   Prior to Admission Medications   Prescriptions Last Dose Informant Patient Reported? Taking?   DULoxetine (CYMBALTA) 60 MG capsule 5/19/2022 at 1200  Yes Yes   Sig: Take 120 mg by mouth daily   albuterol (PROAIR HFA/PROVENTIL HFA/VENTOLIN HFA) 108 (90 Base) MCG/ACT inhaler Past Month at 2 weeks ago  Yes Yes   Sig: Inhale 2 puffs into the lungs every 4 hours as needed for shortness of breath / dyspnea or wheezing   aspirin 81 MG EC tablet 5/19/2022 at 1200  Yes Yes   Sig: [ASPIRIN 81 MG EC TABLET] Take 81 mg by mouth daily.   atorvastatin (LIPITOR) 40 MG tablet 5/19/2022 at 1200  Yes Yes   Sig: [ATORVASTATIN (LIPITOR) 40 MG TABLET] Take 40 mg by mouth daily.   hydrOXYzine (ATARAX) 25 MG tablet Past Week at PRN  Yes Yes   Sig: Take 25-50 mg by mouth every 8 hours as needed for itching   insulin glargine (LANTUS; BASAGLAR) 100 unit/mL (3 mL) pen 5/19/2022 at 1200  Yes Yes   Sig: [INSULIN GLARGINE (LANTUS; BASAGLAR) 100 UNIT/ML (3 ML) PEN] Inject 15 Units under the skin every morning.   lisinopril (PRINIVIL,ZESTRIL) 10 MG tablet 5/19/2022 at 1200  Yes Yes   Sig: [LISINOPRIL (PRINIVIL,ZESTRIL) 10 MG TABLET] Take 10 mg by mouth daily.      Facility-Administered Medications: None     Allergies   No Known Allergies    Physical Exam   Vital Signs: Temp: 98  F (36.7  C) Temp src: Temporal BP: 98/64 Pulse: 85   Resp: 24 SpO2: 95 % O2 Device: None (Room air)    Weight: 215 lbs 0 oz    Constitutional: awake and alert  ENT: normocepalic, without obvious abnormality, atramatic  Respiratory: no increased work of breathing, good air exchange and clear to auscultation  Cardiovascular: regular rate and rhythm and normal S1 and S2  GI: normal bowel sounds, soft and non-tender  Skin: Dermatomal rash that is crusted over the right buttock  Musculoskeletal: no lower extremity pitting edema present  there is no redness, warmth, or swelling of the joints  Neurologic: Mental Status Exam:   Level of Alertness:   awake  Orientation:   person, place, time  Motor Exam:  moves all extremities well and symmetrically  Neuropsychiatric: General: normal, calm and normal eye contact    Data   Data reviewed today: I reviewed all medications, new labs and imaging results over the last 24 hours. I personally reviewed the EKG tracing showing Sinus rhythm at 99 bpm, low voltage QRS, nonspecific ST waves, no significant changes from 2018.    Recent Labs   Lab 05/19/22  1802   WBC 4.3   HGB 14.4   MCV 92      INR 0.99   *   POTASSIUM 4.8   CHLORIDE 91*   CO2 23   BUN 25   CR 1.54*   ANIONGAP 11   OSCAR 9.8      ALBUMIN 3.9   PROTTOTAL 7.5   BILITOTAL 0.6   ALKPHOS 79   ALT 12   AST 20     Recent Results (from the past 24 hour(s))   Cervical spine CT w/o contrast    Narrative    EXAM: CT CERVICAL SPINE W/O CONTRAST  LOCATION: [facility]  DATE/TIME: 5/19/2022 8:56 PM    INDICATION: Neck pain, headache  COMPARISON: None.  TECHNIQUE: Routine CT Cervical Spine without IV contrast. Multiplanar reformats. Dose reduction techniques were used.    FINDINGS:  VERTEBRA: Reversal of the usual cervical lordosis. Vertebral body height is normal. 2 mm anterolisthesis of C2, 2 mm retrolisthesis of C5 and C6 No fracture or posttraumatic subluxation.     CANAL/FORAMINA: Mild central canal stenosis at C5-6. There is severe foraminal stenosis on the right at C5-6, bilaterally at C6-7 and on the left at C3-4 and C2-3.    PARASPINAL: Partial opacification of the left mastoid air cells.      Impression    IMPRESSION:  1.  No fracture or posttraumatic subluxation.  2.  Reversal of the usual cervical lordosis.  3.  Multilevel foraminal stenosis as detailed above..   CT Chest Pulmonary Embolism w Contrast    Narrative    EXAM: CT CHEST PULMONARY EMBOLISM W CONTRAST  LOCATION: [facility]  DATE/TIME: 5/19/2022 8:56 PM    INDICATION: Shortness of breath with exertion,  syncope  COMPARISON: None.  TECHNIQUE: CT chest pulmonary angiogram during arterial phase injection of IV contrast. Multiplanar reformats and MIP reconstructions were performed. Dose reduction techniques were used.   CONTRAST: isovue 370  75ml    FINDINGS:  ANGIOGRAM CHEST: No evidence for pulmonary embolism. Pulmonary arteries normal in caliber. Thoracic aorta normal in caliber. No aortic dissection or other acute abnormality.    HEART: Cardiac chambers within normal limits. No pericardial effusion. Severe coronary artery calcification.    MEDIASTINUM: No adenopathy or mass. Small hiatal hernia.    LUNGS AND PLEURA: No pulmonary mass, consolidation, or suspicious pulmonary nodule. No pleural effusion or pneumothorax.    LIMITED UPPER ABDOMEN: Mild bilateral adrenal gland thickening. There are a few calcified granulomas in the spleen.    MUSCULOSKELETAL: Severe degenerative change in the right glenohumeral joint. There are left sixth and seventh and eighth rib fractures laterally, chronic.      Impression    IMPRESSION:  1.  No evidence for pulmonary embolism.   CTA Head Neck with Contrast    Narrative    EXAM: CTA  HEAD NECK WITH CONTRAST  LOCATION: [facility]  DATE/TIME: 5/19/2022 8:54 PM    INDICATION: Headache. Syncopal episode.  COMPARISON: Cervical spine CT on the same date  CONTRAST: isovue 370  75ml  TECHNIQUE: Head and neck CT angiogram with IV contrast. Noncontrast head CT followed by axial helical CT images of the head and neck vessels obtained during the arterial phase of intravenous contrast administration. Axial 2D reconstructed images and   multiplanar 3D MIP reconstructed images of the head and neck vessels were performed by the technologist. Dose reduction techniques were used. All stenosis measurements made according to NASCET criteria unless otherwise specified.    FINDINGS:   NONCONTRAST HEAD CT:   INTRACRANIAL CONTENTS: No intracranial hemorrhage, extraaxial collection,  or mass effect.  No CT evidence of acute infarct. Mild presumed chronic small vessel ischemic changes. Mild generalized volume loss. No hydrocephalus.     VISUALIZED ORBITS/SINUSES/MASTOIDS: No intraorbital abnormality. Air-fluid level left maxillary sinus. Scattered fluid/membrane thickening in the left mastoid air cells. No apparent mass in the posterior nasopharynx or skull base.    BONES/SOFT TISSUES: No acute abnormality.    HEAD CTA:  ANTERIOR CIRCULATION: No stenosis/occlusion, aneurysm, or high flow vascular malformation. Standard [place] of [place] anatomy.    POSTERIOR CIRCULATION: No stenosis/occlusion, aneurysm, or high flow vascular malformation. Dominant right and smaller left vertebral artery contribute to a normal basilar artery.     DURAL VENOUS SINUSES: Expected enhancement of the major dural venous sinuses.    NECK CTA:  RIGHT CAROTID: No measurable stenosis or dissection.    LEFT CAROTID: No measurable stenosis or dissection.    VERTEBRAL ARTERIES: No focal stenosis or dissection. Dominant right and smaller left vertebral arteries.    AORTIC ARCH: Classic aortic arch anatomy with no significant stenosis at the origin of the great vessels.    NONVASCULAR STRUCTURES: Unremarkable.      Impression    IMPRESSION:   HEAD CT:  1.  No acute intracranial process.  2.  Moderate air-fluid level left maxillary sinus. Correlate for symptoms of acute sinusitis.    HEAD CTA:   1.  No stenosis/occlusion, aneurysm, or high flow vascular malformation.    NECK CTA:  1.  No measurable stenosis or dissection.

## 2022-05-20 NOTE — PLAN OF CARE
No neuro changes. Denies headache, no dizziness, shingles itch improved with atarax.  Has chronic back pain- cart is very uncomfortable.  Harsh non- productive cough (smokes about a pack/day)  declines smoking cessation information- not interested.  Teaching done re: hyponatremia - states Cymbalta makes him very thirsty and he drinks several glasses of fluids between meals and is usually up multiple times a night to void.  Is happy he has a Hernández in place. Falls prevention plan in place. Keyona Duffy RN    Problem: Risk for Delirium  Goal: Improved Attention and Thought Clarity  Outcome: Ongoing, Progressing     Problem: Fall Injury Risk  Goal: Absence of Fall and Fall-Related Injury  Outcome: Ongoing, Progressing     Problem: Pain Chronic (Persistent)  Goal: Acceptable Pain Control and Functional Ability  Outcome: Ongoing, Progressing

## 2022-05-20 NOTE — PROGRESS NOTES
[facility]    Medicine Progress Note - Hospitalist Service    Date of Admission:  5/19/2022    Assessment & Plan              Donovan Jane is a 71 year old male admitted on 5/19/2022. He was sent to the ED from the urgent care for evaluation of syncope, nausea, vomiting, diarrhea, dyspnea on exertion, headache, lightheadedness.       5/20 :     Sodium improving slowly  Creatinine normalized  Continue iv hydration  Blood sugars stable  No other new complaints      Not medically ready for discharge at this time.      A/p :     1.  Hyponatremia  Likely secondary to GI losses  Check urine sodium, urine osmolality  IV hydration  Monitor sodium level     2.  Acute kidney injury  Secondary to dehydration  IV hydration  Hold lisinopril     3.  Syncope  Likely secondary to orthostatic hypotension in the setting of dehydration  Telemetry monitoring  Serial enzymes  Echocardiogram to evaluate structure and function     4.  Urinary retention  Symptoms of difficulty urinating and incontinent dribbling  Hernández catheter inserted, 1 L drained  Will need outpatient urology follow-up  Consider Flomax once blood pressure is stable     5.  Right buttock shingles  Lesions are crusted over  Local skin care     6.  Dyspnea on exertion  Initially some productive cough and felt febrile  Heavy tobacco use  CT chest showed no pulmonary mass, consolidation or suspicious pulmonary nodule; no pleural effusion or pneumothorax  Continue albuterol as needed  Cardiac work-up as above     7.  Acute gastroenteritis  Likely viral illness  Check enteric bacteria and viral panel  Dark stool reported, positive occult blood  Hemoglobin is at baseline, will expect some degree of hemodilution on serial checks     8.  Type 2 diabetes mellitus with long-term insulin use  Continue Lantus 15 units in a.m.  Monitor with insulin sliding scale and hypoglycemia protocol     9.  Essential hypertension  Soft blood pressures while in the  "ED  Hold lisinopril for now     10.  Depression, anxiety  Continue PTA duloxetine     11.  Tobacco abuse  Cessation education  Patient declined a nicotine patch at this time       Diet: Combination Diet Moderate Consistent Carb (60 g CHO per Meal) Diet; Low Saturated Fat Na <2400mg Diet    DVT Prophylaxis: Enoxaparin (Lovenox) SQ  Hernández Catheter: PRESENT, indication: Retention  Central Lines: None  Cardiac Monitoring: ACTIVE order. Indication: Syncope- low cardiac risk (24 hours)  Code Status: Full Code      Disposition Plan   Expected Discharge: hyponatremia  Anticipated discharge location:  Home  Delays:  Low sodium       The patient's care was discussed with the Bedside Nurse and Patient.    Atilio Garcia MD  Hospitalist Service  [facility]  Securely message with the Vocera Web Console (learn more here)  Text page via [employer] Paging/Directory         Clinically Significant Risk Factors Present on Admission         # Hyponatremia: Na = 127 mmol/L (Ref range: 136 - 145 mmol/L) on admission, will monitor as appropriate        # Platelet Defect: home medication list includes an antiplatelet medication   # Obesity: Estimated body mass index is 32.69 kg/m  as calculated from the following:    Height as of this encounter: 1.727 m (5' 8\").    Weight as of this encounter: 97.5 kg (215 lb).      ______________________________________________________________________        Data reviewed today: I reviewed all medications, new labs and imaging results over the last 24 hours. I personally reviewed no images or EKG's today.    Physical Exam   Vital Signs: Temp: 97.7  F (36.5  C) Temp src: Oral BP: 104/73 Pulse: 81   Resp: 22 SpO2: 92 % O2 Device: None (Room air)    Weight: 215 lbs 0 oz       GENERAL: The patient is not in any acute distressed. Awake and alert.  HEENT: Nonicteric sclerae, PERRLA, EOMI. Oropharynx clear. Moist mucous membranes. Conjunctivae appear well perfused.  HEART: Regular rate and " rhythm without murmurs.  LUNGS: Clear to auscultation bilaterally. No wheezing or crackles.  ABDOMEN: Soft, positive bowel sounds, nontender.  SKIN: No rash, no excessive bruising, petechiae, or purpura.  EXTREMITIES : no rashes, no swelling in legs.  NEUROLOGIC: conscious and oriented, follows commands, no obvious focal deficits.  ROS: All other systems negative       Data   Recent Labs   Lab 05/20/22  1649 05/20/22  1127 05/20/22  0930 05/20/22  0734 05/20/22  0026 05/19/22  1802   WBC  --   --   --   --   --  4.3   HGB  --   --  13.6  --  12.9* 14.4   MCV  --   --   --   --   --  92   PLT  --   --   --   --   --  289   INR  --   --   --   --   --  0.99   NA  --   --  127*  --  126* 125*   POTASSIUM  --   --  5.3*  --   --  4.8   CHLORIDE  --   --  99  --   --  91*   CO2  --   --  21*  --   --  23   BUN  --   --  18  --   --  25   CR  --   --  1.27  --   --  1.54*   ANIONGAP  --   --  7  --   --  11   OSCAR  --   --  9.0  --   --  9.8   * 102* 119   < >  --  109   ALBUMIN  --   --   --   --   --  3.9   PROTTOTAL  --   --   --   --   --  7.5   BILITOTAL  --   --   --   --   --  0.6   ALKPHOS  --   --   --   --   --  79   ALT  --   --   --   --   --  12   AST  --   --   --   --   --  20    < > = values in this interval not displayed.

## 2022-05-20 NOTE — PHARMACY-ADMISSION MEDICATION HISTORY
Pharmacy Note - Admission Medication History    Pertinent Provider Information: n/a     ______________________________________________________________________    Prior To Admission (PTA) med list completed and updated in EMR.       PTA Med List   Medication Sig Last Dose     albuterol (PROAIR HFA/PROVENTIL HFA/VENTOLIN HFA) 108 (90 Base) MCG/ACT inhaler Inhale 2 puffs into the lungs every 4 hours as needed for shortness of breath / dyspnea or wheezing Past Month at 2 weeks ago     aspirin 81 MG EC tablet [ASPIRIN 81 MG EC TABLET] Take 81 mg by mouth daily. 5/19/2022 at 1200     atorvastatin (LIPITOR) 40 MG tablet [ATORVASTATIN (LIPITOR) 40 MG TABLET] Take 40 mg by mouth daily. 5/19/2022 at 1200     DULoxetine (CYMBALTA) 60 MG capsule Take 120 mg by mouth daily 5/19/2022 at 1200     hydrOXYzine (ATARAX) 25 MG tablet Take 25-50 mg by mouth every 8 hours as needed for itching Past Week at PRN     insulin glargine (LANTUS; BASAGLAR) 100 unit/mL (3 mL) pen [INSULIN GLARGINE (LANTUS; BASAGLAR) 100 UNIT/ML (3 ML) PEN] Inject 15 Units under the skin every morning. 5/19/2022 at 1200     lisinopril (PRINIVIL,ZESTRIL) 10 MG tablet [LISINOPRIL (PRINIVIL,ZESTRIL) 10 MG TABLET] Take 10 mg by mouth daily. 5/19/2022 at 1200       Information source(s): [facility]  Method of interview communication: in-person with N95 mask and jolene    Summary of Changes to PTA Med List  New: albuterol  Discontinued: acamprosate, magnesium, metoprolol, fish oil  Changed: Cymbalta 90mg to 120mg    Patient was asked about OTC/herbal products specifically.  PTA med list reflects this.    Allergies were reviewed, assessed, and updated with the patient.      Patient does not anticipate needing any multi-use medications during admission.    The information provided in this note is only as accurate as the sources available at the time of the update(s).    Thank you for the opportunity to participate in the care of this  patient.    Azucena Laboy, [facility]  5/19/2022 11:25 PM

## 2022-05-21 ENCOUNTER — APPOINTMENT (OUTPATIENT)
Dept: MRI IMAGING | Facility: HOSPITAL | Age: 72
End: 2022-05-21
Attending: INTERNAL MEDICINE
Payer: MEDICARE

## 2022-05-21 ENCOUNTER — APPOINTMENT (OUTPATIENT)
Dept: PHYSICAL THERAPY | Facility: HOSPITAL | Age: 72
End: 2022-05-21
Attending: INTERNAL MEDICINE
Payer: MEDICARE

## 2022-05-21 ENCOUNTER — APPOINTMENT (OUTPATIENT)
Dept: OCCUPATIONAL THERAPY | Facility: HOSPITAL | Age: 72
End: 2022-05-21
Attending: INTERNAL MEDICINE
Payer: MEDICARE

## 2022-05-21 LAB
ANION GAP SERPL CALCULATED.3IONS-SCNC: 6 MMOL/L (ref 5–18)
BUN SERPL-MCNC: 13 MG/DL (ref 8–28)
CALCIUM SERPL-MCNC: 9 MG/DL (ref 8.5–10.5)
CHLORIDE BLD-SCNC: 104 MMOL/L (ref 98–107)
CO2 SERPL-SCNC: 23 MMOL/L (ref 22–31)
CREAT SERPL-MCNC: 1.16 MG/DL (ref 0.7–1.3)
GFR SERPL CREATININE-BSD FRML MDRD: 68 ML/MIN/1.73M2
GLUCOSE BLD-MCNC: 74 MG/DL (ref 70–125)
GLUCOSE BLDC GLUCOMTR-MCNC: 107 MG/DL (ref 70–99)
GLUCOSE BLDC GLUCOMTR-MCNC: 123 MG/DL (ref 70–99)
GLUCOSE BLDC GLUCOMTR-MCNC: 81 MG/DL (ref 70–99)
GLUCOSE BLDC GLUCOMTR-MCNC: 88 MG/DL (ref 70–99)
HGB BLD-MCNC: 12.9 G/DL (ref 13.3–17.7)
POTASSIUM BLD-SCNC: 5 MMOL/L (ref 3.5–5)
SODIUM SERPL-SCNC: 131 MMOL/L (ref 136–145)
SODIUM SERPL-SCNC: 133 MMOL/L (ref 136–145)

## 2022-05-21 PROCEDURE — 250N000013 HC RX MED GY IP 250 OP 250 PS 637: Performed by: HOSPITALIST

## 2022-05-21 PROCEDURE — 36415 COLL VENOUS BLD VENIPUNCTURE: CPT | Performed by: INTERNAL MEDICINE

## 2022-05-21 PROCEDURE — 96361 HYDRATE IV INFUSION ADD-ON: CPT | Mod: XU

## 2022-05-21 PROCEDURE — G0378 HOSPITAL OBSERVATION PER HR: HCPCS

## 2022-05-21 PROCEDURE — 250N000013 HC RX MED GY IP 250 OP 250 PS 637: Performed by: INTERNAL MEDICINE

## 2022-05-21 PROCEDURE — 97535 SELF CARE MNGMENT TRAINING: CPT | Mod: GO

## 2022-05-21 PROCEDURE — 258N000003 HC RX IP 258 OP 636: Performed by: INTERNAL MEDICINE

## 2022-05-21 PROCEDURE — 82962 GLUCOSE BLOOD TEST: CPT | Mod: 91

## 2022-05-21 PROCEDURE — 82310 ASSAY OF CALCIUM: CPT | Performed by: INTERNAL MEDICINE

## 2022-05-21 PROCEDURE — 70551 MRI BRAIN STEM W/O DYE: CPT

## 2022-05-21 PROCEDURE — 97166 OT EVAL MOD COMPLEX 45 MIN: CPT | Mod: GO

## 2022-05-21 PROCEDURE — 97116 GAIT TRAINING THERAPY: CPT | Mod: GP

## 2022-05-21 PROCEDURE — 85018 HEMOGLOBIN: CPT | Performed by: INTERNAL MEDICINE

## 2022-05-21 PROCEDURE — 36415 COLL VENOUS BLD VENIPUNCTURE: CPT | Performed by: HOSPITALIST

## 2022-05-21 PROCEDURE — 250N000011 HC RX IP 250 OP 636: Performed by: INTERNAL MEDICINE

## 2022-05-21 PROCEDURE — 96372 THER/PROPH/DIAG INJ SC/IM: CPT | Mod: XU | Performed by: INTERNAL MEDICINE

## 2022-05-21 PROCEDURE — 84295 ASSAY OF SERUM SODIUM: CPT | Performed by: HOSPITALIST

## 2022-05-21 PROCEDURE — 99226 PR SUBSEQUENT OBSERVATION CARE,LEVEL III: CPT | Performed by: INTERNAL MEDICINE

## 2022-05-21 PROCEDURE — 97161 PT EVAL LOW COMPLEX 20 MIN: CPT | Mod: GP

## 2022-05-21 RX ORDER — MAGNESIUM OXIDE 400 MG/1
400 TABLET ORAL 2 TIMES DAILY
Status: DISCONTINUED | OUTPATIENT
Start: 2022-05-21 | End: 2022-05-21

## 2022-05-21 RX ORDER — LISINOPRIL 2.5 MG/1
10 TABLET ORAL DAILY
Qty: 30 TABLET | Refills: 1 | Status: ON HOLD | OUTPATIENT
Start: 2022-05-21 | End: 2022-06-12

## 2022-05-21 RX ORDER — LIDOCAINE 4 G/G
1 PATCH TOPICAL
Status: DISCONTINUED | OUTPATIENT
Start: 2022-05-21 | End: 2022-05-22 | Stop reason: HOSPADM

## 2022-05-21 RX ORDER — BISACODYL 5 MG
10 TABLET, DELAYED RELEASE (ENTERIC COATED) ORAL ONCE
Status: COMPLETED | OUTPATIENT
Start: 2022-05-21 | End: 2022-05-21

## 2022-05-21 RX ORDER — POLYETHYLENE GLYCOL 3350 17 G/17G
17 POWDER, FOR SOLUTION ORAL 2 TIMES DAILY
Status: COMPLETED | OUTPATIENT
Start: 2022-05-21 | End: 2022-05-22

## 2022-05-21 RX ORDER — SODIUM CHLORIDE 9 MG/ML
INJECTION, SOLUTION INTRAVENOUS CONTINUOUS
Status: DISCONTINUED | OUTPATIENT
Start: 2022-05-21 | End: 2022-05-22 | Stop reason: HOSPADM

## 2022-05-21 RX ADMIN — NICOTINE 1 PATCH: 21 PATCH, EXTENDED RELEASE TRANSDERMAL at 08:22

## 2022-05-21 RX ADMIN — POLYETHYLENE GLYCOL 3350 17 G: 17 POWDER, FOR SOLUTION ORAL at 21:29

## 2022-05-21 RX ADMIN — ACETAMINOPHEN 650 MG: 325 TABLET ORAL at 08:28

## 2022-05-21 RX ADMIN — ACETAMINOPHEN 650 MG: 325 TABLET ORAL at 22:15

## 2022-05-21 RX ADMIN — DULOXETINE HYDROCHLORIDE 120 MG: 60 CAPSULE, DELAYED RELEASE PELLETS ORAL at 08:19

## 2022-05-21 RX ADMIN — SODIUM CHLORIDE: 9 INJECTION, SOLUTION INTRAVENOUS at 18:15

## 2022-05-21 RX ADMIN — HYDROXYZINE HYDROCHLORIDE 50 MG: 25 TABLET, FILM COATED ORAL at 17:10

## 2022-05-21 RX ADMIN — LIDOCAINE 1 PATCH: 560 PATCH PERCUTANEOUS; TOPICAL; TRANSDERMAL at 18:12

## 2022-05-21 RX ADMIN — ATORVASTATIN CALCIUM 40 MG: 40 TABLET, FILM COATED ORAL at 08:19

## 2022-05-21 RX ADMIN — ENOXAPARIN SODIUM 40 MG: 40 INJECTION SUBCUTANEOUS at 18:12

## 2022-05-21 RX ADMIN — HYDROXYZINE HYDROCHLORIDE 25 MG: 25 TABLET, FILM COATED ORAL at 02:49

## 2022-05-21 RX ADMIN — ACETAMINOPHEN 650 MG: 325 TABLET ORAL at 15:49

## 2022-05-21 RX ADMIN — FAMOTIDINE 20 MG: 20 TABLET ORAL at 20:03

## 2022-05-21 RX ADMIN — BISACODYL 10 MG: 5 TABLET, COATED ORAL at 21:29

## 2022-05-21 RX ADMIN — INSULIN GLARGINE 15 UNITS: 100 INJECTION, SOLUTION SUBCUTANEOUS at 08:20

## 2022-05-21 RX ADMIN — Medication 400 MG: at 08:19

## 2022-05-21 RX ADMIN — FAMOTIDINE 20 MG: 20 TABLET ORAL at 08:19

## 2022-05-21 ASSESSMENT — ACTIVITIES OF DAILY LIVING (ADL): PREVIOUS_RESPONSIBILITIES: MEAL PREP;HOUSEKEEPING;LAUNDRY;SHOPPING;MEDICATION MANAGEMENT;FINANCES

## 2022-05-21 NOTE — PROGRESS NOTES
PRIMARY DIAGNOSIS: GASTROENTERITIS    OUTPATIENT/OBSERVATION GOALS TO BE MET BEFORE DISCHARGE  1. Orthostatic performed: No    2. Tolerating PO fluid and/or antibiotics (if applicable): Yes    3. Nausea/Vomiting/Diarrhea symptoms improved: Yes    4. Pain status: Improved-controlled with oral pain medications.    5. Return to near baseline physical activity: Yes    Discharge Planner Nurse   Safe discharge environment identified: Yes  Barriers to discharge: No       Entered by: Carole De Jesus RN 05/21/2022 1:18 AM     Please review provider order for any additional goals.   Nurse to notify provider when observation goals have been met and patient is ready for discharge.

## 2022-05-21 NOTE — PROGRESS NOTES
[facility]    Medicine Progress Note - Hospitalist Service    Date of Admission:  5/19/2022    Assessment & Plan                Donovan Jane is a 71 year old male admitted on 5/19/2022. He was sent to the ED from the urgent care for evaluation of syncope, nausea, vomiting, diarrhea, dyspnea on exertion, headache, lightheadedness.       5/21 :     Sodium and creatinine have normalized  Still feeling lightheaded, wobbly on ambulation  Pt/ot evaluation ordered, orthostatics negative.  Echo normal, no bradycardia or other abnormality noted on tele.  Continue gentle  iv hydration, MRI brain to r/o stroke  Blood sugars stable  No other new complaints      Not medically ready for discharge at this time.      A/p :     1.  Hyponatremia  Likely secondary to GI losses  Check urine sodium, urine osmolality  IV hydration  Monitor sodium level     2.  Acute kidney injury  Secondary to dehydration  IV hydration  Hold lisinopril     3.  Syncope  Likely secondary to orthostatic hypotension in the setting of dehydration  Telemetry monitoring  Serial enzymes  Echocardiogram to evaluate structure and function     4.  Urinary retention  Symptoms of difficulty urinating and incontinent dribbling  Hernández catheter inserted, 1 L drained  Will need outpatient urology follow-up  Consider Flomax once blood pressure is stable     5.  Right buttock shingles  Lesions are crusted over  Local skin care     6.  Dyspnea on exertion  Initially some productive cough and felt febrile  Heavy tobacco use  CT chest showed no pulmonary mass, consolidation or suspicious pulmonary nodule; no pleural effusion or pneumothorax  Continue albuterol as needed  Cardiac work-up as above     7.  Acute gastroenteritis  Likely viral illness  Check enteric bacteria and viral panel  Dark stool reported, positive occult blood  Hemoglobin is at baseline, will expect some degree of hemodilution on serial checks     8.  Type 2 diabetes mellitus with  "long-term insulin use  Continue Lantus 15 units in a.m.  Monitor with insulin sliding scale and hypoglycemia protocol     9.  Essential hypertension  Soft blood pressures while in the ED  Hold lisinopril for now     10.  Depression, anxiety  Continue PTA duloxetine     11.  Tobacco abuse  Cessation education  Patient declined a nicotine patch at this time       Diet: Combination Diet Moderate Consistent Carb (60 g CHO per Meal) Diet; Low Saturated Fat Na <2400mg Diet  Diet    DVT Prophylaxis: Enoxaparin (Lovenox) SQ  Hernández Catheter: Not present  Central Lines: None  Cardiac Monitoring: ACTIVE order. Indication: Syncope- low cardiac risk (24 hours)  Code Status: Full Code      Disposition Plan   Expected Discharge: hyponatremia  Anticipated discharge location:  Home  Delays:  Low sodium       The patient's care was discussed with the Bedside Nurse and Patient.    Atilio Garcia MD  Hospitalist Service  [facility]  Securely message with the Vocera Web Console (learn more here)  Text page via [employer] Paging/Directory         Clinically Significant Risk Factors Present on Admission                # Platelet Defect: home medication list includes an antiplatelet medication   # Obesity: Estimated body mass index is 32.69 kg/m  as calculated from the following:    Height as of this encounter: 1.727 m (5' 8\").    Weight as of this encounter: 97.5 kg (215 lb).      ______________________________________________________________________        Data reviewed today: I reviewed all medications, new labs and imaging results over the last 24 hours. I personally reviewed no images or EKG's today.    Physical Exam   Vital Signs: Temp: 97.9  F (36.6  C) Temp src: Oral BP: 136/76 Pulse: 77   Resp: 18 SpO2: 97 % O2 Device: None (Room air)    Weight: 215 lbs 0 oz       GENERAL: The patient is not in any acute distressed. Awake and alert.  HEENT: Nonicteric sclerae, PERRLA, EOMI. Oropharynx clear. Moist mucous " membranes. Conjunctivae appear well perfused.  HEART: Regular rate and rhythm without murmurs.  LUNGS: Clear to auscultation bilaterally. No wheezing or crackles.  ABDOMEN: Soft, positive bowel sounds, nontender.  SKIN: No rash, no excessive bruising, petechiae, or purpura.  EXTREMITIES : no rashes, no swelling in legs.  NEUROLOGIC: conscious and oriented, follows commands, no obvious focal deficits.  ROS: All other systems negative       Data   Recent Labs   Lab 05/21/22  1155 05/21/22  0758 05/21/22  0712 05/21/22  0006 05/20/22  2121 05/20/22  1805 05/20/22  1127 05/20/22  0930 05/20/22  0026 05/19/22 1802   WBC  --   --   --   --   --  3.6*  --   --   --  4.3   HGB  --   --  12.9*  --   --  12.2*  --  13.6   < > 14.4   MCV  --   --   --   --   --  95  --   --   --  92   PLT  --   --   --   --   --  259  --   --   --  289   INR  --   --   --   --   --   --   --   --   --  0.99   NA  --   --  133* 131*  --  128*  --  127*   < > 125*   POTASSIUM  --   --  5.0  --   --   --   --  5.3*  --  4.8   CHLORIDE  --   --  104  --   --   --   --  99  --  91*   CO2  --   --  23  --   --   --   --  21*  --  23   BUN  --   --  13  --   --   --   --  18  --  25   CR  --   --  1.16  --   --   --   --  1.27  --  1.54*   ANIONGAP  --   --  6  --   --   --   --  7  --  11   OSCAR  --   --  9.0  --   --   --   --  9.0  --  9.8   * 81 74  --    < >  --    < > 119   < > 109   ALBUMIN  --   --   --   --   --   --   --   --   --  3.9   PROTTOTAL  --   --   --   --   --   --   --   --   --  7.5   BILITOTAL  --   --   --   --   --   --   --   --   --  0.6   ALKPHOS  --   --   --   --   --   --   --   --   --  79   ALT  --   --   --   --   --   --   --   --   --  12   AST  --   --   --   --   --   --   --   --   --  20    < > = values in this interval not displayed.

## 2022-05-21 NOTE — PROGRESS NOTES
[facility]      OUTPATIENT OCCUPATIONAL THERAPY  EVALUATION  PLAN OF TREATMENT FOR OUTPATIENT REHABILITATION  (COMPLETE FOR INITIAL CLAIMS ONLY)  Patient's Last Name, First Name, M.I.  YOB: 1950  Donovan Jane                          Provider's Name  [facility] Medical Record No.  [id number]                               Onset Date:  05/19/22   Start of Care Date:  05/21/22     Type:     ___PT   _X_OT   ___SLP Medical Diagnosis:  Syncope                        OT Diagnosis:  decreased ADL, decreased activity tolerance   Visits from SOC:  1   _________________________________________________________________________________  Plan of Treatment/Functional Goals    Planned Interventions: ADL retraining, balance training, transfer training   Goals: See Occupational Therapy Goals on Care Plan in [employer] electronic health record.    Therapy Frequency: Daily  Predicted Duration of Therapy Intervention: 05/25/22  _________________________________________________________________________________    I CERTIFY THE NEED FOR THESE SERVICES FURNISHED UNDER        THIS PLAN OF TREATMENT AND WHILE UNDER MY CARE     (Physician co-signature of this document indicates review and certification of the therapy plan).              Certification date from: 05/21/22, Certification date to: 06/20/22    Referring Physician: Atilio            Initial Assessment        See Occupational Therapy evaluation dated 05/21/22 in Epic electronic health record.

## 2022-05-21 NOTE — PROGRESS NOTES
[facility]      OUTPATIENT PHYSICAL THERAPY EVALUATION  PLAN OF TREATMENT FOR OUTPATIENT REHABILITATION  (COMPLETE FOR INITIAL CLAIMS ONLY)  Patient's Last Name, First Name, M.I.  YOB: 1950  Donovan Jane                        Provider's Name  [facility] Medical Record No.  [id number]                               Onset Date:  05/19/22   Start of Care Date:  05/21/22      Type:     _X_PT   ___OT   ___SLP Medical Diagnosis:  hyponatremia                        PT Diagnosis:  impaired ffunctional mobility, gait abnormality   Visits from SOC:  1   _________________________________________________________________________________  Plan of Treatment/Functional Goals    Planned Interventions: balance training, bed mobility training, gait training, home exercise program, patient/family education, stair training, strengthening, transfer training     Goals: See Physical Therapy Goals on Care Plan in [employer] electronic health record.    Therapy Frequency: Daily  Predicted Duration of Therapy Intervention: 05/28/22  _________________________________________________________________________________    I CERTIFY THE NEED FOR THESE SERVICES FURNISHED UNDER        THIS PLAN OF TREATMENT AND WHILE UNDER MY CARE     (Physician co-signature of this document indicates review and certification of the therapy plan).              Certification date from: 05/21/22, Certification date to: 06/21/22    Referring Physician: Atilio Garcia MD            Initial Assessment        See Physical Therapy evaluation dated 05/21/22 in Epic electronic health record.

## 2022-05-21 NOTE — PROGRESS NOTES
PRIMARY DIAGNOSIS: GASTROENTERITIS     OUTPATIENT/OBSERVATION GOALS TO BE MET BEFORE DISCHARGE  1. Orthostatic performed: No     2. Tolerating PO fluid and/or antibiotics (if applicable): Yes     3. Nausea/Vomiting/Diarrhea symptoms improved: Yes     4. Pain status: Improved-controlled with oral pain medications.     5. Return to near baseline physical activity: Yes     Discharge Planner Nurse   Safe discharge environment identified: Yes  Barriers to discharge: No    NS completed, saline locked. Na 131. No complaints of headache, oriented x 4.

## 2022-05-21 NOTE — CARE PLAN
PRIMARY DIAGNOSIS: GASTROENTERITIS    OUTPATIENT/OBSERVATION GOALS TO BE MET BEFORE DISCHARGE  1. Orthostatic performed: No    2. Tolerating PO fluid and/or antibiotics (if applicable): No    3. Nausea/Vomiting/Diarrhea symptoms improved:      4. Pain status: Improved-controlled with oral pain medications.    5. Return to near baseline physical activity: Yes    Discharge Planner Nurse   Safe discharge environment identified: Yes  Barriers to discharge: No       Entered by: Xiomara Lau RN 05/21/2022 10:55 AM     Please review provider order for any additional goals.   Nurse to notify provider when observation goals have been met and patient is ready for discharge.

## 2022-05-21 NOTE — PLAN OF CARE
PRIMARY DIAGNOSIS: GASTROENTERITIS    OUTPATIENT/OBSERVATION GOALS TO BE MET BEFORE DISCHARGE  1. Orthostatic performed: No    2. Tolerating PO fluid and/or antibiotics (if applicable): Yes    3. Nausea/Vomiting/Diarrhea symptoms improved: Yes    4. Pain status: Improved-controlled with oral pain medications.    5. Return to near baseline physical activity: Yes    Discharge Planner Nurse   Safe discharge environment identified: Yes  Barriers to discharge: Yes       Entered by: Xiomara Lau RN 05/21/2022 12:27 PM     Please review provider order for any additional goals.   Nurse to notify provider when observation goals have been met and patient is ready for discharge.Goal Outcome Evaluation:

## 2022-05-21 NOTE — PLAN OF CARE
Problem: Syncope  Goal: Absence of Syncopal Symptoms  Outcome: Ongoing, Progressing     Problem: Diarrhea (Gastroenteritis)  Goal: Effective Diarrhea Management  Outcome: Ongoing, Progressing     Problem: Fluid Imbalance (Gastroenteritis)  Goal: Fluid Balance  Outcome: Ongoing, Progressing     Problem: Nausea and Vomiting (Gastroenteritis)  Goal: Nausea and Vomiting Relief  Outcome: Ongoing, Progressing       Patient AAOx4. Tylenol administered for chronic back pain. Hernández removed. Noted patient is very unsteady on his feet and he reports that he does have some dizziness at rest and with activity. PT/OT to evaluate. MRI this evening. No diarrhea on this shift.

## 2022-05-21 NOTE — PROGRESS NOTES
05/21/22 1545   Quick Adds   Quick Adds Certification   Type of Visit Initial PT Evaluation   Living Environment   Living Environment Comments see OT note   Self-Care   Activity/Exercise/Self-Care Comment see OT note   General Information   Onset of Illness/Injury or Date of Surgery 05/19/22   Referring Physician Atilio Garcia MD   Patient/Family Therapy Goals Statement (PT) return home today   Pertinent History of Current Problem (include personal factors and/or comorbidities that impact the POC) hyponatremia   Existing Precautions/Restrictions fall   Pain Assessment   Patient Currently in Pain Yes, see Vital Sign flowsheet  (back pain, increases with activity)   Range of Motion (ROM)   Range of Motion ROM is WFL   Strength (Manual Muscle Testing)   Strength (Manual Muscle Testing) strength is WFL   Strength Comments generalized weakness, some inc pain in back with ambulation leading to decreased endurance/ambulation   Bed Mobility   Bed Mobility supine-sit;sit-supine   Supine-Sit [place] (Bed Mobility) supervision   Sit-Supine [place] (Bed Mobility) supervision   Comment, (Bed Mobility) HOB elevated, use of bed rail. Inc pain in back   Transfers   Transfers sit-stand transfer   Sit-Stand Transfer   Sit-Stand [place] (Transfers) supervision;verbal cues   Assistive Device (Sit-Stand Transfers) walker, front-wheeled   Comment, (Sit-Stand Transfer) needing inc v/c for hand placement and safety. Pulls to stand with walker   Gait/Stairs (Locomotion)   [place] Level (Gait) supervision   Assistive Device (Gait) walker, front-wheeled   Distance in Feet (Required for LE Total Joints) 70'   Comment, (Gait/Stairs) declined trial of stairs despite having stairs at home. reports he crawls up stairs. Amb x 70' with FWW, inc instability with turns. Reports inc pain in back, unable to walk further.   Clinical Impression   Criteria for Skilled Therapeutic Intervention Yes, treatment indicated   PT Diagnosis  (PT) impaired ffunctional mobility, gait abnormality   Influenced by the following impairments decreased strength, inc pain, decreased ROM, decreased endurance   Functional limitations due to impairments gait, transfers, stairs   Clinical Presentation (PT Evaluation Complexity) Stable/Uncomplicated   Clinical Presentation Rationale pt presents as medically diagnosed   Clinical Decision Making (Complexity) low complexity   Planned Therapy Interventions (PT) balance training;bed mobility training;gait training;home exercise program;patient/family education;stair training;strengthening;transfer training   Anticipated Equipment Needs at Discharge (PT) walker, rolling  (issued)   Risk & Benefits of therapy have been explained evaluation/treatment results reviewed;patient   PT Discharge Planning   PT Discharge Recommendation (DC Rec) home with assist;home with home care physical therapy   PT Rationale for DC Rec recommending home care PT for inc endurance, strength, and balance   Therapy Certification   Start of care date 05/21/22   Certification date from 05/21/22   Certification date to 06/21/22   Medical Diagnosis hyponatremia   Total Evaluation Time   Total Evaluation Time (Minutes) 10   Physical Therapy Goals   PT Frequency Daily   PT Predicted Duration/Target Date for Goal Attainment 05/28/22   PT Goals Bed Mobility;Transfers;Gait;Stairs   PT: Bed Mobility Independent;Supine to/from sit   PT: Transfers Modified independent;Sit to/from stand;Assistive device   PT: Gait Modified independent;Rolling walker;100 feet   PT: Stairs Supervision/stand-by assist;7 stairs;Rail on left

## 2022-05-21 NOTE — PROGRESS NOTES
05/21/22 1426   Quick Adds   Type of Visit Initial Occupational Therapy Evaluation   Living Environment   People in Home child(emre), adult  (son works long days, not home much)   Current Living Arrangements other (see comments)  (Townhouse)   Home Accessibility stairs within home   Number of Stairs, Within Home, Primary greater than 10 stairs   Stair Railings, Within Home, Primary railings on both sides of stairs   Transportation Anticipated family or friend will provide;other (see comments)  (uber)   Living Environment Comments bedroom, kitchen and half bath on main level. Tub shower and laundry upstairs.   Self-Care   Usual Activity Tolerance good   Current Activity Tolerance fair   Regular Exercise No   Equipment Currently Used at Home none;grab bar, tub/shower   Fall history within last six months yes   Number of times patient has fallen within last six months 4  (syncope episodes, second time in 1 week)   Instrumental Activities of Daily Living (IADL)   Previous Responsibilities meal prep;housekeeping;laundry;shopping;medication management;finances   General Information   Onset of Illness/Injury or Date of Surgery 05/19/22   Referring Physician Atilio   Patient/Family Therapy Goal Statement (OT) Walk and not worry about falling.   Existing Precautions/Restrictions fall   Heart Disease Risk Factors Diabetes;Lack of physical activity   Cognitive Status Examination   Orientation Status orientation to person, place and time   Affect/Mental Status (Cognitive) WNL   Visual Perception   Visual Impairment/Limitations corrective lenses full-time   Sensory   Sensory Quick Adds Other (describe)   Sensory Comments c/o tingling B ant. thighs and B feet. Since last back surgery 2016 but has gotten worse   Pain Assessment   Patient Currently in Pain Yes, see Vital Sign flowsheet  (8/10 low back pain; RN only able to give Tylenol)   Integumentary/Edema   Integumentary/Edema no deficits were identifed   Posture   Posture not  impaired   Range of Motion Comprehensive   General Range of Motion no range of motion deficits identified   Strength Comprehensive (MMT)   General Manual Muscle Testing (MMT) Assessment no strength deficits identified   Coordination   Upper Extremity Coordination No deficits were identified   Bed Mobility   Comment (Bed Mobility) SBA, c/o pain see daily doc for further info   Transfers   Transfers bed-chair transfer;toilet transfer;sit-stand transfer   Transfer Skill: Bed to Chair/Chair to Bed   Bed-Chair [place] (Transfers) contact guard   Sit-Stand Transfer   Sit-Stand [place] (Transfers) contact guard;verbal cues   Toilet Transfer   Type (Toilet Transfer) sit-stand;stand-sit   [place] Level (Toilet Transfer) contact guard;verbal cues   Assistive Device (Toilet Transfer) grab bars/safety frame   Activities of Daily Living   BADL Assessment/Intervention lower body dressing   Lower Body Dressing Assessment/Training   [place] Level (Lower Body Dressing) supervision   Clinical Impression   Criteria for Skilled Therapeutic Interventions Met (OT) Yes, treatment indicated   OT Diagnosis decreased ADL, decreased activity tolerance   Influenced by the following impairments back pain, low standing tolerance, dizziness   OT Problem List-Impairments impacting ADL problems related to;mobility;pain;activity tolerance impaired   Assessment of Occupational Performance 3-5 Performance Deficits   Identified Performance Deficits grooming, functional mobility, IADL, bed mobility, transfers   Planned Therapy Interventions (OT) ADL retraining;balance training;transfer training   Clinical Decision Making Complexity (OT) moderate complexity   Anticipated Equipment Needs Upon Discharge (OT) walker, rolling  (4WW - may have one in storage. Not easily accessible)   Risk & Benefits of therapy have been explained care plan/treatment goals reviewed;patient;participants voiced agreement with care plan   OT Discharge Planning    OT Discharge Recommendation (DC Rec) home with assist;home with home care occupational therapy   OT Rationale for DC Rec pt has limited tolerance for standing to perform ADL and IADL including meal prep   Therapy Certification   Start of Care Date 05/21/22   Certification date from 05/21/22   Certification date to 06/20/22   Medical Diagnosis Syncope   Total Evaluation Time (Minutes)   Total Evaluation Time (Minutes) 25   OT Goals   Therapy Frequency (OT) Daily   OT Predicted Duration/Target Date for Goal Attainment 05/25/22   OT Goals Hygiene/Grooming;Bed Mobility;OT Goal 1   OT: Hygiene/Grooming modified independent   OT: Bed Mobility Independent;rolling;supine to/from sitting   OT: Goal 1 Increase activity tolerance to 5 min standing for ADL

## 2022-05-21 NOTE — PROGRESS NOTES
PRIMARY DIAGNOSIS: GASTROENTERITIS    OUTPATIENT/OBSERVATION GOALS TO BE MET BEFORE DISCHARGE  1. Orthostatic performed: No    2. Tolerating PO fluid and/or antibiotics (if applicable): Yes    3. Nausea/Vomiting/Diarrhea symptoms improved: Yes    4. Pain status: Improved-controlled with oral pain medications.    5. Return to near baseline physical activity: Yes    Discharge Planner Nurse   Safe discharge environment identified: Yes  Barriers to discharge: No       Mg 1.6 Protocol Initiated.

## 2022-05-21 NOTE — ED NOTES
Chart assessed, pt ubered to clinic prior to arrival via EMS to ER. Appears to be independent and may need discharge planning for follow up and ride.    unable to reach via phone in room or cell phone

## 2022-05-22 VITALS
SYSTOLIC BLOOD PRESSURE: 159 MMHG | OXYGEN SATURATION: 99 % | HEIGHT: 68 IN | DIASTOLIC BLOOD PRESSURE: 82 MMHG | RESPIRATION RATE: 20 BRPM | BODY MASS INDEX: 32.58 KG/M2 | TEMPERATURE: 98.3 F | WEIGHT: 215 LBS | HEART RATE: 91 BPM

## 2022-05-22 LAB
CREAT SERPL-MCNC: 1.03 MG/DL (ref 0.7–1.3)
GFR SERPL CREATININE-BSD FRML MDRD: 78 ML/MIN/1.73M2
GLUCOSE BLDC GLUCOMTR-MCNC: 116 MG/DL (ref 70–99)
GLUCOSE BLDC GLUCOMTR-MCNC: 94 MG/DL (ref 70–99)
MAGNESIUM SERPL-MCNC: 1.6 MG/DL (ref 1.8–2.6)
PLATELET # BLD AUTO: 287 10E3/UL (ref 150–450)
SODIUM SERPL-SCNC: 132 MMOL/L (ref 136–145)

## 2022-05-22 PROCEDURE — 250N000013 HC RX MED GY IP 250 OP 250 PS 637: Performed by: INTERNAL MEDICINE

## 2022-05-22 PROCEDURE — G0378 HOSPITAL OBSERVATION PER HR: HCPCS

## 2022-05-22 PROCEDURE — 250N000013 HC RX MED GY IP 250 OP 250 PS 637: Performed by: HOSPITALIST

## 2022-05-22 PROCEDURE — 85049 AUTOMATED PLATELET COUNT: CPT | Performed by: HOSPITALIST

## 2022-05-22 PROCEDURE — 99217 PR OBSERVATION CARE DISCHARGE: CPT | Performed by: INTERNAL MEDICINE

## 2022-05-22 PROCEDURE — 83735 ASSAY OF MAGNESIUM: CPT | Performed by: HOSPITALIST

## 2022-05-22 PROCEDURE — 999N000127 HC STATISTIC PERIPHERAL IV START W US GUIDANCE

## 2022-05-22 PROCEDURE — 82565 ASSAY OF CREATININE: CPT | Performed by: HOSPITALIST

## 2022-05-22 PROCEDURE — 82962 GLUCOSE BLOOD TEST: CPT | Mod: 91

## 2022-05-22 PROCEDURE — 36415 COLL VENOUS BLD VENIPUNCTURE: CPT | Performed by: HOSPITALIST

## 2022-05-22 PROCEDURE — 84295 ASSAY OF SERUM SODIUM: CPT | Performed by: INTERNAL MEDICINE

## 2022-05-22 RX ORDER — LIDOCAINE 4 G/G
1 PATCH TOPICAL EVERY 24 HOURS
Qty: 15 PATCH | Refills: 0 | Status: ON HOLD | OUTPATIENT
Start: 2022-05-22 | End: 2022-07-21

## 2022-05-22 RX ADMIN — NICOTINE 1 PATCH: 21 PATCH, EXTENDED RELEASE TRANSDERMAL at 08:24

## 2022-05-22 RX ADMIN — DULOXETINE HYDROCHLORIDE 120 MG: 60 CAPSULE, DELAYED RELEASE PELLETS ORAL at 08:18

## 2022-05-22 RX ADMIN — INSULIN GLARGINE 15 UNITS: 100 INJECTION, SOLUTION SUBCUTANEOUS at 08:27

## 2022-05-22 RX ADMIN — HYDROXYZINE HYDROCHLORIDE 25 MG: 25 TABLET, FILM COATED ORAL at 03:19

## 2022-05-22 RX ADMIN — ATORVASTATIN CALCIUM 40 MG: 40 TABLET, FILM COATED ORAL at 08:18

## 2022-05-22 RX ADMIN — ACETAMINOPHEN 650 MG: 325 TABLET ORAL at 12:13

## 2022-05-22 RX ADMIN — LIDOCAINE 1 PATCH: 560 PATCH PERCUTANEOUS; TOPICAL; TRANSDERMAL at 08:25

## 2022-05-22 RX ADMIN — POLYETHYLENE GLYCOL 3350 17 G: 17 POWDER, FOR SOLUTION ORAL at 08:19

## 2022-05-22 RX ADMIN — FAMOTIDINE 20 MG: 20 TABLET ORAL at 08:18

## 2022-05-22 NOTE — PROGRESS NOTES
"PRIMARY DIAGNOSIS: \"GENERIC\" NURSING  OUTPATIENT/OBSERVATION GOALS TO BE MET BEFORE DISCHARGE:  1. ADLs back to baseline: Yes     2. Activity and level of assistance: Up with standby assistance.     3. Pain status: Improved-controlled with oral pain medications.     4. Return to near baseline physical activity: Yes            "

## 2022-05-22 NOTE — PROGRESS NOTES
Discharge paperwork reviewed with patient. All personal belongings taken with from room. Son to transport home.

## 2022-05-22 NOTE — PROGRESS NOTES
PRIMARY DIAGNOSIS: GASTROENTERITIS    OUTPATIENT/OBSERVATION GOALS TO BE MET BEFORE DISCHARGE  1. Orthostatic performed: N/A    2. Tolerating PO fluid and/or antibiotics (if applicable): Yes    3. Nausea/Vomiting/Diarrhea symptoms improved:     4. Pain status: Improved-controlled with oral pain medications.    5. Return to near baseline physical activity: Yes    Discharge Planner Nurse   Safe discharge environment identified: Yes  Barriers to discharge: No       Entered by: Xiomara Lau RN 05/22/2022 12:06 PM     Please review provider order for any additional goals.   Nurse to notify provider when observation goals have been met and patient is ready for discharge.

## 2022-05-22 NOTE — PROGRESS NOTES
Care Management Discharge Note    Discharge Date: 05/22/2022       Discharge Disposition:  Home with home care    Discharge Services:  Home PT and OT    Discharge DME:  Walker/cane    Discharge Transportation: family or friend will provide, other (see comments) (uber)    Private pay costs discussed: Not applicable    Education Provided on the Discharge Plan:    Persons Notified of Discharge Plans: patient  Patient/Family in Agreement with the Plan:  yes    Handoff Referral Completed: Yes    Additional Information:  Chart reviewed. Met with MD for updates. Pt stable for discharge.   SW met with pt prior to discharge, he is in agreement to PT and OT at home as MD has ordered.  Referral and orders sent to [facility].       Maya Patel, JOHNSW

## 2022-05-22 NOTE — PROGRESS NOTES
"PRIMARY DIAGNOSIS: \"GENERIC\" NURSING  OUTPATIENT/OBSERVATION GOALS TO BE MET BEFORE DISCHARGE:  1. ADLs back to baseline: Yes    2. Activity and level of assistance: Up with standby assistance.    3. Pain status: Improved-controlled with oral pain medications.    4. Return to near baseline physical activity: Yes     Discharge Planner Nurse   Safe discharge environment identified: Yes  Barriers to discharge: No       Entered by: Carole De Jesus RN 05/22/2022 6:36 AM      Patient ambulated in rush way with walker, steady gate. Bladder scan twice for urinary retention. Pt voided 3 times per shift average of 400 mL each time. Pain managed with tylenol and lidocaine patch. Denies nausea and dizziness. He lost IV access this morning on the way to the bathroom. NS 50mL per hour for hyponatremia. [facility] team paged.  "

## 2022-05-22 NOTE — PLAN OF CARE
Occupational Therapy Discharge Summary    Reason for therapy discharge:    Discharged to home with home therapy.    Progress towards therapy goal(s). See goals on Care Plan in [facility] electronic health record for goal details.  Goals partially met.  Barriers to achieving goals:   discharge from facility.    Therapy recommendation(s):    Continued therapy is recommended.  Rationale/Recommendations:  Pt would benefit from further therapy and check in assist at home to increase Ind and safety with ADL/IADL.    Goal Outcome Evaluation:

## 2022-05-23 ENCOUNTER — PATIENT OUTREACH (OUTPATIENT)
Dept: CARE COORDINATION | Facility: CLINIC | Age: 72
End: 2022-05-23
Payer: MEDICARE

## 2022-05-23 DIAGNOSIS — Z71.89 OTHER SPECIFIED COUNSELING: ICD-10-CM

## 2022-05-23 NOTE — PROGRESS NOTES
Clinic Care Coordination Contact  [facility]: Post-Discharge Note  SITUATION                                                      Admission:    Admission Date: 05/19/22   Reason for Admission: Headache, lightheadedness, dyspnea on exertion, nausea, vomiting, diarrhea, difficulty urinating  Discharge:   Discharge Date: 05/22/22  Discharge Diagnosis: Headache, lightheadedness, dyspnea on exertion, nausea, vomiting, diarrhea, difficulty urinating    BACKGROUND                                                      Per hospital discharge summary and inpatient provider notes:    Donovan Jane is a 71 year old male who was sent to the ED from the urgent care for evaluation of above chief complaint.  Past medical history of hypertension, type 2 diabetes, degenerative disc disease, chronic low back pain, anxiety.  Over the last 10 days patient has had a number of symptoms including dyspnea on exertion and initially felt febrile with a productive cough.  He denied sore throat, nasal congestion, chest pain or palpitations.  He reports the last COVID vaccine booster was a few months ago.  In addition, he has had some nausea, vomiting and diarrhea.  The stool is dark but denies abiodun blood and also denied abdominal pain.  He lives with his son and denies sick contacts or recent traveling.  Over the last week, he developed a painful rash on his right buttock.  He described persistent dyspnea on exertion with lightheadedness and occasional headache.  Today, he had to lower himself to his knees while in the kitchen and closed his eyes but does not think that he passed out.  Later on, he woke up on the floor and had blood on his nose.  He describes a new onset of difficulty urinating with some incontinent dribbling while sitting on the couch.  Given all of the symptoms he was sent to the ED for further evaluation.  Patient smokes less than 1 pack of cigarettes per day.  He drinks beer occasionally with last drink 2.5 weeks  ago.    ASSESSMENT      Enrollment  Primary Care Care Coordination Status: Not a Candidate    Discharge Assessment  How are you doing now that you are home?: Not doing too bad. I do have some back pain. I am not having any dizziness since being home.  How are your symptoms? (Red Flag symptoms escalate to triage hotline per guidelines): Improved  Do you feel your condition is stable enough to be safe at home until your provider visit?: Yes  Does the patient have their discharge instructions? : Yes  Does the patient have questions regarding their discharge instructions? : No  Were you started on any new medications or were there changes to any of your previous medications? : Yes  Does the patient have all of their medications?: No (see comment) (Patient has not picked up his new medication yet but plans on doing so.)  Do you have questions regarding any of your medications? : No  Do you have all of your needed medical supplies or equipment (DME)?  (i.e. oxygen tank, CPAP, cane, etc.): Yes  Discharge follow-up appointment scheduled within 14 calendar days? : No ([facility] reminded patient to [facility] follow up)  Is patient agreeable to assistance with scheduling? : No             PLAN                                                      Outpatient Plan: Follow up with primary care provider, Smitha Hameed, within 7 days for hospital follow- up. The following labs/tests are  recommended: bmp.    No future appointments.      For any urgent concerns, please contact our 24 hour nurse triage line: [phone])         BRADEN Collins  [phone]  [facility]

## 2022-06-01 NOTE — DISCHARGE SUMMARY
[facility]  Hospitalist Discharge Summary     Date of Admission:  5/19/2022  Date of Discharge:  5/22/2022  1:06 PM  Discharging Provider: Atilio Garcia MD  Discharge Service: Hospitalist Service    Discharge Diagnoses         Donovan Jane is a 71 year old male admitted on 5/19/2022. He was sent to the ED from the urgent care for evaluation of syncope, nausea, vomiting, diarrhea, dyspnea on exertion, headache, lightheadedness.           A/p :      1.  Hyponatremia  Likely secondary to GI losses  Check urine sodium, urine osmolality  IV hydration  Monitor sodium level  resolved     2.  Acute kidney injury  Secondary to dehydration  IV hydration  Hold lisinopril  resolved     3.  Syncope/dizziness  Likely secondary to orthostatic hypotension in the setting of dehydration  Telemetry monitoring : no abnormality  Serial enzymes : normal  Echocardiogram to evaluate structure and function : normal  MRI brain to r/o stroke     4.  Urinary retention  Symptoms of difficulty urinating and incontinent dribbling  Hernández catheter inserted, 1 L drained  Will need outpatient urology follow-up  Consider Flomax once blood pressure is stable     5.  Right buttock shingles  Lesions are crusted over  Local skin care     6.  Dyspnea on exertion  Initially some productive cough and felt febrile  Heavy tobacco use  CT chest showed no pulmonary mass, consolidation or suspicious pulmonary nodule; no pleural effusion or pneumothorax  Continue albuterol as needed  Cardiac work-up as above     7.  Acute gastroenteritis  Likely viral illness  Check enteric bacteria and viral panel  Dark stool reported, positive occult blood  Hemoglobin is at baseline, will expect some degree of hemodilution on serial checks  resolved     8.  Type 2 diabetes mellitus with long-term insulin use  Continue Lantus 15 units in a.m.  Monitor with insulin sliding scale and hypoglycemia protocol  stable     9.  Essential hypertension  Soft blood  pressures while in the ED  Hold lisinopril for now     10.  Depression, anxiety  Continue PTA duloxetine     11.  Tobacco abuse  Cessation education  Patient declined a nicotine patch at this time    Follow-ups Needed After Discharge   Follow-up Appointments     Follow-up and recommended labs and tests       Follow up with primary care provider, Smitha Hameed, within 7 days for   hospital follow- up.  The following labs/tests are recommended: bmp.         {Additional follow-up instructions/to-do's for PCP    : bmp     Unresulted Labs Ordered in the Past 30 Days of this Admission     No orders found from 4/19/2022 to 5/20/2022.      These results will be followed up by pcp    Discharge Disposition   Discharged to home  Condition at discharge: Stable        Consultations This Hospital Stay   CARE MANAGEMENT / SOCIAL WORK IP CONSULT  PHYSICAL THERAPY ADULT IP CONSULT  OCCUPATIONAL THERAPY ADULT IP CONSULT    Code Status   Prior    Time Spent on this Encounter   IAtilio MD, personally saw the patient today and spent greater than 30 minutes discharging this patient.       Atilio Garcia MD  [address] [address]  Phone: [phone]  Fax: [phone]  ______________________________________________________________________    Physical Exam   Vital Signs:                   Weight: 215 lbs 0 oz       GENERAL: The patient is not in any acute distressed. Awake and alert.  HEENT: Nonicteric sclerae, PERRLA, EOMI. Oropharynx clear. Moist mucous membranes. Conjunctivae appear well perfused.  HEART: Regular rate and rhythm without murmurs.  LUNGS: Clear to auscultation bilaterally. No wheezing or crackles.  ABDOMEN: Soft, positive bowel sounds, nontender.  SKIN: No rash, no excessive bruising, petechiae, or purpura.  EXTREMITIES : no rashes, no swelling in legs.  NEUROLOGIC: conscious and oriented, follows commands, no obvious focal deficits.  ROS: All  other systems negative          Primary Care Physician   Smitha Hameed    Discharge Orders      Basic metabolic panel     Home Care Referral      Reason for your hospital stay    SYNCOPE     Follow-up and recommended labs and tests     Follow up with primary care provider, Smitha Hameed, within 7 days for hospital follow- up.  The following labs/tests are recommended: bmp.     Activity    Your activity upon discharge: activity as tolerated     Walker Order    DME Documentation:   Describe the reason for need to support medical necessity: dizziness, inc back pain.     I, the undersigned, certify that the above prescribed supplies are medically necessary for this patient and is both reasonable and necessary in reference to accepted standards of medical and necessary in reference to accepted standards of medical practice in the treatment of this patient's condition and is not prescribed as a convenience.     Diet    Follow this diet upon discharge: Orders Placed This Encounter      Combination Diet Moderate Consistent Carb (60 g CHO per Meal) Diet; Low Saturated Fat Na <2400mg Diet       Significant Results and Procedures   Most Recent 3 CBC's:Recent Labs   Lab Test 05/22/22  0737 05/21/22  0712 05/20/22  1805 05/20/22  0930 05/20/22  0026 05/19/22  1802 07/23/18  1301   WBC  --   --  3.6*  --   --  4.3 7.6   HGB  --  12.9* 12.2* 13.6   < > 14.4 14.9   MCV  --   --  95  --   --  92 94     --  259  --   --  289 278    < > = values in this interval not displayed.     Most Recent 3 BMP's:Recent Labs   Lab Test 05/22/22  1204 05/22/22  0737 05/22/22  0731 05/21/22  2122 05/21/22  0758 05/21/22  0712 05/21/22  0006 05/20/22  1127 05/20/22  0930 05/20/22  0026 05/19/22  1802   NA  --  132*  --   --   --  133* 131*   < > 127*   < > 125*   POTASSIUM  --   --   --   --   --  5.0  --   --  5.3*  --  4.8   CHLORIDE  --   --   --   --   --  104  --   --  99  --  91*   CO2  --   --   --   --   --  23  --   --  21*  --   23   BUN  --   --   --   --   --  13  --   --  18  --  25   CR  --  1.03  --   --   --  1.16  --   --  1.27  --  1.54*   ANIONGAP  --   --   --   --   --  6  --   --  7  --  11   OSCAR  --   --   --   --   --  9.0  --   --  9.0  --  9.8   *  --  94 107*   < > 74  --    < > 119   < > 109    < > = values in this interval not displayed.       Discharge Medications   Discharge Medication List as of 5/22/2022 12:53 PM      START taking these medications    Details   Lidocaine (LIDOCARE) 4 % Patch Place 1 patch onto the skin every 24 hours To prevent lidocaine toxicity, patient should be patch free for 12 hrs daily.Disp-15 patch, R-0Local Print         CONTINUE these medications which have CHANGED    Details   lisinopril (ZESTRIL) 2.5 MG tablet Take 4 tablets (10 mg) by mouth daily, Disp-30 tablet, R-1, E-Prescribe         CONTINUE these medications which have NOT CHANGED    Details   albuterol (PROAIR HFA/PROVENTIL HFA/VENTOLIN HFA) 108 (90 Base) MCG/ACT inhaler Inhale 2 puffs into the lungs every 4 hours as needed for shortness of breath / dyspnea or wheezing, Historical      aspirin 81 MG EC tablet [ASPIRIN 81 MG EC TABLET] Take 81 mg by mouth daily., Historical      atorvastatin (LIPITOR) 40 MG tablet [ATORVASTATIN (LIPITOR) 40 MG TABLET] Take 40 mg by mouth daily., Historical      DULoxetine (CYMBALTA) 60 MG capsule Take 120 mg by mouth daily, Historical      hydrOXYzine (ATARAX) 25 MG tablet Take 25-50 mg by mouth every 8 hours as needed for itching, Historical      insulin glargine (LANTUS; BASAGLAR) 100 unit/mL (3 mL) pen [INSULIN GLARGINE (LANTUS; BASAGLAR) 100 UNIT/ML (3 ML) PEN] Inject 15 Units under the skin every morning., Historical           Allergies   No Known Allergies

## 2022-06-09 ENCOUNTER — APPOINTMENT (OUTPATIENT)
Dept: MRI IMAGING | Facility: HOSPITAL | Age: 72
DRG: 552 | End: 2022-06-09
Attending: FAMILY MEDICINE
Payer: MEDICARE

## 2022-06-09 ENCOUNTER — HOSPITAL ENCOUNTER (INPATIENT)
Facility: HOSPITAL | Age: 72
LOS: 2 days | Discharge: HOME HEALTH | DRG: 552 | End: 2022-06-12
Attending: FAMILY MEDICINE | Admitting: STUDENT IN AN ORGANIZED HEALTH CARE EDUCATION/TRAINING PROGRAM
Payer: MEDICARE

## 2022-06-09 DIAGNOSIS — E83.42 HYPOMAGNESEMIA: ICD-10-CM

## 2022-06-09 DIAGNOSIS — M54.10 BACK PAIN WITH RADICULOPATHY: ICD-10-CM

## 2022-06-09 DIAGNOSIS — R55 SYNCOPE, UNSPECIFIED SYNCOPE TYPE: ICD-10-CM

## 2022-06-09 DIAGNOSIS — E87.1 HYPONATREMIA: ICD-10-CM

## 2022-06-09 DIAGNOSIS — R33.9 URINARY RETENTION: ICD-10-CM

## 2022-06-09 DIAGNOSIS — R42 LIGHTHEADEDNESS: ICD-10-CM

## 2022-06-09 DIAGNOSIS — Z71.6 ENCOUNTER FOR SMOKING CESSATION COUNSELING: ICD-10-CM

## 2022-06-09 DIAGNOSIS — G89.4 CHRONIC PAIN SYNDROME: Primary | ICD-10-CM

## 2022-06-09 DIAGNOSIS — N30.00 ACUTE CYSTITIS WITHOUT HEMATURIA: ICD-10-CM

## 2022-06-09 LAB
ALBUMIN SERPL-MCNC: 3.9 G/DL (ref 3.5–5)
ALBUMIN UR-MCNC: NEGATIVE MG/DL
ALP SERPL-CCNC: 78 U/L (ref 45–120)
ALT SERPL W P-5'-P-CCNC: 15 U/L (ref 0–45)
ANION GAP SERPL CALCULATED.3IONS-SCNC: 10 MMOL/L (ref 5–18)
APPEARANCE UR: CLEAR
AST SERPL W P-5'-P-CCNC: 18 U/L (ref 0–40)
BACTERIA #/AREA URNS HPF: ABNORMAL /HPF
BASOPHILS # BLD AUTO: 0 10E3/UL (ref 0–0.2)
BASOPHILS NFR BLD AUTO: 0 %
BILIRUB SERPL-MCNC: 0.5 MG/DL (ref 0–1)
BILIRUB UR QL STRIP: NEGATIVE
BUN SERPL-MCNC: 10 MG/DL (ref 8–28)
CALCIUM SERPL-MCNC: 10.2 MG/DL (ref 8.5–10.5)
CHLORIDE BLD-SCNC: 96 MMOL/L (ref 98–107)
CO2 SERPL-SCNC: 22 MMOL/L (ref 22–31)
COLOR UR AUTO: ABNORMAL
CREAT SERPL-MCNC: 1.12 MG/DL (ref 0.7–1.3)
EOSINOPHIL # BLD AUTO: 0.1 10E3/UL (ref 0–0.7)
EOSINOPHIL NFR BLD AUTO: 3 %
ERYTHROCYTE [DISTWIDTH] IN BLOOD BY AUTOMATED COUNT: 13.6 % (ref 10–15)
GFR SERPL CREATININE-BSD FRML MDRD: 71 ML/MIN/1.73M2
GLUCOSE BLD-MCNC: 152 MG/DL (ref 70–125)
GLUCOSE UR STRIP-MCNC: NEGATIVE MG/DL
HCT VFR BLD AUTO: 41.9 % (ref 40–53)
HGB BLD-MCNC: 14.7 G/DL (ref 13.3–17.7)
HGB UR QL STRIP: NEGATIVE
HOLD SPECIMEN: NORMAL
IMM GRANULOCYTES # BLD: 0 10E3/UL
IMM GRANULOCYTES NFR BLD: 0 %
INR PPP: 1 (ref 0.85–1.15)
KETONES UR STRIP-MCNC: NEGATIVE MG/DL
LACTATE SERPL-SCNC: 2.3 MMOL/L (ref 0.7–2)
LEUKOCYTE ESTERASE UR QL STRIP: ABNORMAL
LIPASE SERPL-CCNC: 115 U/L (ref 0–52)
LYMPHOCYTES # BLD AUTO: 1.3 10E3/UL (ref 0.8–5.3)
LYMPHOCYTES NFR BLD AUTO: 34 %
MAGNESIUM SERPL-MCNC: 1.4 MG/DL (ref 1.8–2.6)
MCH RBC QN AUTO: 32.8 PG (ref 26.5–33)
MCHC RBC AUTO-ENTMCNC: 35.1 G/DL (ref 31.5–36.5)
MCV RBC AUTO: 94 FL (ref 78–100)
MONOCYTES # BLD AUTO: 0.7 10E3/UL (ref 0–1.3)
MONOCYTES NFR BLD AUTO: 18 %
NEUTROPHILS # BLD AUTO: 1.7 10E3/UL (ref 1.6–8.3)
NEUTROPHILS NFR BLD AUTO: 45 %
NITRATE UR QL: POSITIVE
NRBC # BLD AUTO: 0 10E3/UL
NRBC BLD AUTO-RTO: 0 /100
PH UR STRIP: 7 [PH] (ref 5–7)
PLATELET # BLD AUTO: 262 10E3/UL (ref 150–450)
POTASSIUM BLD-SCNC: 4.4 MMOL/L (ref 3.5–5)
PROT SERPL-MCNC: 7.2 G/DL (ref 6–8)
RBC # BLD AUTO: 4.48 10E6/UL (ref 4.4–5.9)
RBC URINE: 0 /HPF
SARS-COV-2 RNA RESP QL NAA+PROBE: NEGATIVE
SODIUM SERPL-SCNC: 128 MMOL/L (ref 136–145)
SP GR UR STRIP: 1.01 (ref 1–1.03)
SQUAMOUS EPITHELIAL: <1 /HPF
TROPONIN I SERPL-MCNC: 0.02 NG/ML (ref 0–0.29)
UROBILINOGEN UR STRIP-MCNC: <2 MG/DL
WBC # BLD AUTO: 3.8 10E3/UL (ref 4–11)
WBC URINE: 47 /HPF

## 2022-06-09 PROCEDURE — 93005 ELECTROCARDIOGRAM TRACING: CPT | Performed by: EMERGENCY MEDICINE

## 2022-06-09 PROCEDURE — 96375 TX/PRO/DX INJ NEW DRUG ADDON: CPT

## 2022-06-09 PROCEDURE — 81001 URINALYSIS AUTO W/SCOPE: CPT | Performed by: EMERGENCY MEDICINE

## 2022-06-09 PROCEDURE — 80053 COMPREHEN METABOLIC PANEL: CPT | Performed by: EMERGENCY MEDICINE

## 2022-06-09 PROCEDURE — 96366 THER/PROPH/DIAG IV INF ADDON: CPT

## 2022-06-09 PROCEDURE — 82077 ASSAY SPEC XCP UR&BREATH IA: CPT | Performed by: FAMILY MEDICINE

## 2022-06-09 PROCEDURE — 87088 URINE BACTERIA CULTURE: CPT | Performed by: EMERGENCY MEDICINE

## 2022-06-09 PROCEDURE — 83735 ASSAY OF MAGNESIUM: CPT | Performed by: EMERGENCY MEDICINE

## 2022-06-09 PROCEDURE — 82533 TOTAL CORTISOL: CPT | Performed by: INTERNAL MEDICINE

## 2022-06-09 PROCEDURE — 258N000003 HC RX IP 258 OP 636: Performed by: EMERGENCY MEDICINE

## 2022-06-09 PROCEDURE — 85610 PROTHROMBIN TIME: CPT | Performed by: EMERGENCY MEDICINE

## 2022-06-09 PROCEDURE — 84300 ASSAY OF URINE SODIUM: CPT | Performed by: INTERNAL MEDICINE

## 2022-06-09 PROCEDURE — 72158 MRI LUMBAR SPINE W/O & W/DYE: CPT

## 2022-06-09 PROCEDURE — 87635 SARS-COV-2 COVID-19 AMP PRB: CPT | Performed by: FAMILY MEDICINE

## 2022-06-09 PROCEDURE — 96361 HYDRATE IV INFUSION ADD-ON: CPT

## 2022-06-09 PROCEDURE — 96368 THER/DIAG CONCURRENT INF: CPT

## 2022-06-09 PROCEDURE — 84443 ASSAY THYROID STIM HORMONE: CPT | Performed by: INTERNAL MEDICINE

## 2022-06-09 PROCEDURE — 96365 THER/PROPH/DIAG IV INF INIT: CPT | Mod: 59

## 2022-06-09 PROCEDURE — 82607 VITAMIN B-12: CPT | Performed by: STUDENT IN AN ORGANIZED HEALTH CARE EDUCATION/TRAINING PROGRAM

## 2022-06-09 PROCEDURE — 258N000003 HC RX IP 258 OP 636: Performed by: FAMILY MEDICINE

## 2022-06-09 PROCEDURE — 84484 ASSAY OF TROPONIN QUANT: CPT | Performed by: EMERGENCY MEDICINE

## 2022-06-09 PROCEDURE — G0378 HOSPITAL OBSERVATION PER HR: HCPCS

## 2022-06-09 PROCEDURE — 255N000002 HC RX 255 OP 636: Performed by: FAMILY MEDICINE

## 2022-06-09 PROCEDURE — 99285 EMERGENCY DEPT VISIT HI MDM: CPT | Mod: 25

## 2022-06-09 PROCEDURE — 85014 HEMATOCRIT: CPT | Performed by: EMERGENCY MEDICINE

## 2022-06-09 PROCEDURE — 250N000011 HC RX IP 250 OP 636: Performed by: FAMILY MEDICINE

## 2022-06-09 PROCEDURE — A9585 GADOBUTROL INJECTION: HCPCS | Performed by: FAMILY MEDICINE

## 2022-06-09 PROCEDURE — C9803 HOPD COVID-19 SPEC COLLECT: HCPCS

## 2022-06-09 PROCEDURE — 70551 MRI BRAIN STEM W/O DYE: CPT

## 2022-06-09 PROCEDURE — 83605 ASSAY OF LACTIC ACID: CPT | Performed by: EMERGENCY MEDICINE

## 2022-06-09 PROCEDURE — 83935 ASSAY OF URINE OSMOLALITY: CPT | Performed by: INTERNAL MEDICINE

## 2022-06-09 PROCEDURE — 36415 COLL VENOUS BLD VENIPUNCTURE: CPT | Performed by: EMERGENCY MEDICINE

## 2022-06-09 PROCEDURE — 83690 ASSAY OF LIPASE: CPT | Performed by: EMERGENCY MEDICINE

## 2022-06-09 RX ORDER — LORAZEPAM 2 MG/ML
0.5 INJECTION INTRAMUSCULAR ONCE
Status: COMPLETED | OUTPATIENT
Start: 2022-06-09 | End: 2022-06-09

## 2022-06-09 RX ORDER — LIDOCAINE 4 G/G
1 PATCH TOPICAL
Status: DISCONTINUED | OUTPATIENT
Start: 2022-06-09 | End: 2022-06-10

## 2022-06-09 RX ORDER — ONDANSETRON 2 MG/ML
4 INJECTION INTRAMUSCULAR; INTRAVENOUS ONCE
Status: COMPLETED | OUTPATIENT
Start: 2022-06-09 | End: 2022-06-09

## 2022-06-09 RX ORDER — CEFTRIAXONE 1 G/1
1 INJECTION, POWDER, FOR SOLUTION INTRAMUSCULAR; INTRAVENOUS ONCE
Status: COMPLETED | OUTPATIENT
Start: 2022-06-09 | End: 2022-06-09

## 2022-06-09 RX ORDER — GADOBUTROL 604.72 MG/ML
10 INJECTION INTRAVENOUS ONCE
Status: COMPLETED | OUTPATIENT
Start: 2022-06-09 | End: 2022-06-09

## 2022-06-09 RX ORDER — MAGNESIUM SULFATE HEPTAHYDRATE 40 MG/ML
2 INJECTION, SOLUTION INTRAVENOUS ONCE
Status: COMPLETED | OUTPATIENT
Start: 2022-06-09 | End: 2022-06-09

## 2022-06-09 RX ORDER — SODIUM CHLORIDE 9 MG/ML
INJECTION, SOLUTION INTRAVENOUS CONTINUOUS
Status: DISCONTINUED | OUTPATIENT
Start: 2022-06-09 | End: 2022-06-09

## 2022-06-09 RX ORDER — ACETAMINOPHEN 325 MG/1
650 TABLET ORAL EVERY 6 HOURS PRN
Status: DISCONTINUED | OUTPATIENT
Start: 2022-06-09 | End: 2022-06-10

## 2022-06-09 RX ADMIN — LORAZEPAM 0.5 MG: 2 INJECTION INTRAMUSCULAR; INTRAVENOUS at 17:13

## 2022-06-09 RX ADMIN — GADOBUTROL 10 ML: 604.72 INJECTION INTRAVENOUS at 18:28

## 2022-06-09 RX ADMIN — ONDANSETRON 4 MG: 2 INJECTION INTRAMUSCULAR; INTRAVENOUS at 16:03

## 2022-06-09 RX ADMIN — SODIUM CHLORIDE: 9 INJECTION, SOLUTION INTRAVENOUS at 15:53

## 2022-06-09 RX ADMIN — MAGNESIUM SULFATE IN WATER 2 G: 40 INJECTION, SOLUTION INTRAVENOUS at 19:51

## 2022-06-09 RX ADMIN — CEFTRIAXONE SODIUM 1 G: 1 INJECTION, POWDER, FOR SOLUTION INTRAMUSCULAR; INTRAVENOUS at 21:24

## 2022-06-09 RX ADMIN — SODIUM CHLORIDE 500 ML: 9 INJECTION, SOLUTION INTRAVENOUS at 15:03

## 2022-06-09 RX ADMIN — SODIUM CHLORIDE 1000 ML: 9 INJECTION, SOLUTION INTRAVENOUS at 19:51

## 2022-06-09 ASSESSMENT — ENCOUNTER SYMPTOMS
NUMBNESS: 1
NAUSEA: 1
VOMITING: 1
WEAKNESS: 1
BACK PAIN: 1
DIZZINESS: 1
COUGH: 1
SHORTNESS OF BREATH: 1

## 2022-06-09 NOTE — ED NOTES
Patient states that dizziness has been constant for over a month and was hospitalized for it previously. At times the dizziness feels like the floor is moving up towards face. Nothing seems to make it better.

## 2022-06-09 NOTE — ED NOTES
"ED Provider In Triage Note  [facility]  Encounter Date: Jun 9, 2022    Chief Complaint   Patient presents with     Dizziness            Brief HPI:   Donovan Jane is a 71 year old male presenting to the Emergency Department with a chief complaint of dizziness, weakness.  Recent hospital admission for metabolic derangement and multiple other issues    2:45 PM  Note:  With IV start in triage, syncopal episode.  Complete LOC, 1-2 minutes.  Moved to bed.  Fluids initiated.  Nausea with vomiting post episode    Brief Physical Exam:  /80   Pulse (!) 121   Temp 98.1  F (36.7  C) (Oral)   Resp 16   Ht 1.74 m (5' 8.5\")   Wt 90.7 kg (200 lb)   SpO2 96%   BMI 29.97 kg/m    General: Non-toxic appearing  HEENT: Atraumatic  Resp: No respiratory distress  Abdomen: Non-peritoneal  Neuro: Alert, oriented, answers questions appropriately  Psych: Behavior appropriate      Plan Initiated in Triage:  Orders Placed This Encounter     Comprehensive metabolic panel     Lipase     INR     Lactic acid whole blood     Troponin I     Magnesium     UA with Microscopic reflex to Culture     FOLLOWED BY Linked Order Group      0.9% sodium chloride BOLUS      sodium chloride 0.9% infusion       PIT Dispo:   Take directly back to main ED    Luis Pereira MD on 6/9/2022 at 2:27 PM    Patient was evaluated by the Physician in Triage due to a limitation of available rooms in the Emergency Department. A plan of care was discussed based on the information obtained on the initial evaluation and patient was consuled to return back to the Emergency Department lobby after this initial evalutaiton until results were obtained or a room became available in the Emergency Department. Patient was counseled not to leave prior to receiving the results of their workup.     Luis Pereira MD  [facility] EMERGENCY DEPARTMENT  [address] [place] [id number]  [phone]     Kelli, " Luis JANE MD  06/09/22 2013

## 2022-06-09 NOTE — ED PROVIDER NOTES
EMERGENCY DEPARTMENT ENCOUNTER      NAME: Donovan Jane  AGE: 71 year old male  YOB: 1950  MRN: [id number]  EVALUATION DATE & TIME: 6/9/2022  3:02 PM    PCP: Smitha Hameed    ED PROVIDER: Qasim Yarbrough M.D.    Chief Complaint   Patient presents with     Dizziness              FINAL IMPRESSION:  1. Hyponatremia    2. Hypomagnesemia    3. Acute cystitis without hematuria    4. Lightheadedness        ED COURSE & MEDICAL DECISION MAKING:    Pertinent Labs & Imaging studies personally reviewed and interpreted by me. (See chart for details)  3:21 PM Patient seen and examined, prior records reviewed.  Differential diagnosis includes but not limited to CVA, TIA, labyrinthitis, vestibular neuritis, Ménière's disease, electrolyte disturbance, otitis, MI.  Patient presents with dizziness which she describes both as lightheadedness and then also his room spinning, but does note that he feels like he might pass out when he has this.  He is mildly tachycardic on initial exam, afebrile.  I cannot elicit symptoms on exam.  Description is more consistent with near syncope but as patient also says he has been having some balance problems, MRI of the brain is ordered.  Additionally, review of systems, patient notes bowel and bladder incontinence and numbness in his groin area for the last 10 days or so.  On exam, he does have some saddle anesthesia.  MRI is ordered for evaluation.  5:02 PM patient is noted to have leukopenia, in conjunction with his tachycardia and lightheadedness, high suspicion for COVID infection.  Lactate is slightly elevated and IV fluids will be initiated.  Magnesium is also low and this will be replaced intravenously.  7:05 PM Other than hypomagnesemia, labs so far reassuring.  MRI pending.  COVID test is negative.  Patient has not yet provided a urine sample.  We will continue to monitor.  No definite source for patient's symptoms found yet, mild hyponatremia which could be  contributing.  7:24 PM Patient remains tachycardic however, not hypotensive.  7:52 PM UA still pending, contacted lab regarding delay.  8:21 PM urinalysis is consistent with infection.  No prior culture or sensitivities, Rocephin IV is ordered.  8:35 PM spoke with patient and updated him on diagnosis and plan.  Plan to observe overnight.  9:12 PM care discussed with Dr. Haq for observation.    At the conclusion of the encounter I discussed the results of all of the tests and the disposition. The questions were answered. The patient or family acknowledged understanding and was agreeable with the care plan.     PROCEDURES:   Procedures    MEDICATIONS GIVEN IN THE EMERGENCY:  Medications   magnesium sulfate 2 g in water intermittent infusion (2 g Intravenous New Bag 6/1950)   cefTRIAXone (ROCEPHIN) 1 g vial to attach to  mL bag for ADULTS or NS 50 mL bag for PEDS (has no administration in time range)   0.9% sodium chloride BOLUS (0 mLs Intravenous Stopped 6/9/22 1553)   ondansetron (ZOFRAN) injection 4 mg (4 mg Intravenous Given 6/9/22 1603)   LORazepam (ATIVAN) injection 0.5 mg (0.5 mg Intravenous Given 6/9/22 1713)   gadobutrol (GADAVIST) injection 10 mL (10 mLs Intravenous Given 6/9/22 1828)   0.9% sodium chloride BOLUS (1,000 mLs Intravenous New Bag 6/1950)       NEW PRESCRIPTIONS STARTED AT TODAY'S ER VISIT  New Prescriptions    No medications on file       =================================================================    HPI    Patient information was obtained from: Patient      Donovan Jane is a 71 year old male with a pertinent history of back surgery, alcohol use disorder, diabetes mellitus associated with pancreatic disease, HTN, obesity, hyponatremia, syncope who presents to this ED via walk in for evaluation of dizziness.     Per chart review,  Patient was admitted to [facility] on 5/19 -5/20/22 for evaluation of nausea, vomiting, diarrhea and a syncopal episode. Patient was  noted to have SHABANA and hyponatremia. Echo did not show any concerning abnormalities or arrhythmias. Patient was clinically improved and discharged home.     Patient reports room spinning weakness, dizziness, nausea, vomiting and shortness of breath that is worse with exertion. States he has been having these episodes for the past 2 to 3 weeks, but they have been more constant and severe over the last couple days. Patient endorses numbness in his saddle region for the past 10 days, which has led to several occasions of incontinence. Patient also endorses worsening back pain, states he has had 5 back surgeries in the past. Endorses a chronic cough due to smoking. Denies chest pain, leg swelling or any other medical complaint at this time.     REVIEW OF SYSTEMS   Review of Systems   Respiratory: Positive for cough and shortness of breath.    Cardiovascular: Negative for chest pain and leg swelling.   Gastrointestinal: Positive for nausea and vomiting.   Musculoskeletal: Positive for back pain.   Neurological: Positive for dizziness, weakness and numbness.   All other systems reviewed and are negative.     All other systems reviewed and negative    PAST MEDICAL HISTORY:  History reviewed. No pertinent past medical history.    PAST SURGICAL HISTORY:  Past Surgical History:   Procedure Laterality Date     BACK SURGERY         CURRENT MEDICATIONS:    Current Facility-Administered Medications   Medication     cefTRIAXone (ROCEPHIN) 1 g vial to attach to  mL bag for ADULTS or NS 50 mL bag for PEDS     magnesium sulfate 2 g in water intermittent infusion     Current Outpatient Medications   Medication     albuterol (PROAIR HFA/PROVENTIL HFA/VENTOLIN HFA) 108 (90 Base) MCG/ACT inhaler     aspirin 81 MG EC tablet     atorvastatin (LIPITOR) 40 MG tablet     DULoxetine (CYMBALTA) 60 MG capsule     hydrOXYzine (ATARAX) 25 MG tablet     insulin glargine (LANTUS; BASAGLAR) 100 unit/mL (3 mL) pen     Lidocaine (LIDOCARE) 4 %  "Patch     lisinopril (ZESTRIL) 2.5 MG tablet       ALLERGIES:  No Known Allergies    FAMILY HISTORY:  History reviewed. No pertinent family history.    SOCIAL HISTORY:   Social History     Socioeconomic History     Marital status:    Tobacco Use     Smoking status: Former Smoker     Packs/day: 1.50     Years: 15.00     Pack years: 22.50     Quit date: 7/23/2018     Years since quitting: 3.8     Smokeless tobacco: Never Used   Substance and Sexual Activity     Alcohol use: Yes     Drug use: Yes     Types: Marijuana     Sexual activity: Yes     Partners: Female     Birth control/protection: None       VITALS:  BP (!) 161/76   Pulse 108   Temp 98.1  F (36.7  C) (Oral)   Resp 18   Ht 1.74 m (5' 8.5\")   Wt 90.7 kg (200 lb)   SpO2 96%   BMI 29.97 kg/m      PHYSICAL EXAM:  Physical Exam  Vitals and nursing note reviewed.   Constitutional:       Appearance: Normal appearance.   HENT:      Head: Normocephalic and atraumatic.      Right Ear: External ear normal.      Left Ear: External ear normal.      Nose: Nose normal.      Mouth/Throat:      Mouth: Mucous membranes are moist.   Eyes:      Extraocular Movements: Extraocular movements intact.      Conjunctiva/sclera: Conjunctivae normal.      Pupils: Pupils are equal, round, and reactive to light.   Cardiovascular:      Rate and Rhythm: Normal rate and regular rhythm.   Pulmonary:      Effort: Pulmonary effort is normal.      Breath sounds: Normal breath sounds. No wheezing or rales.   Abdominal:      General: Abdomen is flat. There is no distension.      Palpations: Abdomen is soft.      Tenderness: There is no abdominal tenderness. There is no guarding.   Musculoskeletal:         General: Normal range of motion.      Cervical back: Normal range of motion and neck supple.      Right lower leg: No edema.      Left lower leg: No edema.   Lymphadenopathy:      Cervical: No cervical adenopathy.   Skin:     General: Skin is warm and dry.   Neurological:      " General: No focal deficit present.      Mental Status: He is alert and oriented to person, place, and time. Mental status is at baseline.      Comments: Decreased sensation right lateral leg   Psychiatric:         Mood and Affect: Mood normal.         Behavior: Behavior normal.         Thought Content: Thought content normal.          LAB:  All pertinent labs reviewed and interpreted.  Results for orders placed or performed during the hospital encounter of 06/09/22   MR Lumbar Spine w/o & w Contrast    Impression    IMPRESSION:  1.  Advanced lumbar spondylosis with high-grade canal stenosis at L2-L3 and L3-L4 and multilevel moderate or high-grade foraminal stenosis as described.    2.  There is mild chronic height loss anterior-inferior aspect of L1. There is fluid signal within the central and ventral aspect of the L1-L2 intervertebral disc space with marginal endplate enhancement at this level. Although fluid signal is seen in   the disc space in the setting of infection, the appearance of associated chronic endplate degenerative changes at this level, lack of endplate edema, and lack of prevertebral fat stranding favor degenerative change.    3.  Prior decompressive laminectomies at L1-L2 and L4-L5 and L5-S1. Canal is well decompressed dorsally at these levels.   MR Brain w/o Contrast    Impression    IMPRESSION:  1.  No finding for intracranial hemorrhage, mass, or acute infarct.    2.  Mild to moderate volume loss and mild presumed sequela chronic microvascular ischemic change.    3.  Moderate fluid or mucosal thickening left mastoid sinus compatible with inflammatory change and similar to prior.   Comprehensive metabolic panel   Result Value Ref Range    Sodium 128 (L) 136 - 145 mmol/L    Potassium 4.4 3.5 - 5.0 mmol/L    Chloride 96 (L) 98 - 107 mmol/L    Carbon Dioxide (CO2) 22 22 - 31 mmol/L    Anion Gap 10 5 - 18 mmol/L    Urea Nitrogen 10 8 - 28 mg/dL    Creatinine 1.12 0.70 - 1.30 mg/dL    Calcium 10.2  8.5 - 10.5 mg/dL    Glucose 152 (H) 70 - 125 mg/dL    Alkaline Phosphatase 78 45 - 120 U/L    AST 18 0 - 40 U/L    ALT 15 0 - 45 U/L    Protein Total 7.2 6.0 - 8.0 g/dL    Albumin 3.9 3.5 - 5.0 g/dL    Bilirubin Total 0.5 0.0 - 1.0 mg/dL    GFR Estimate 71 >60 mL/min/1.73m2   Result Value Ref Range    Lipase 115 (H) 0 - 52 U/L   Result Value Ref Range    INR 1.00 0.85 - 1.15   Lactic acid whole blood   Result Value Ref Range    Lactic Acid 2.3 (H) 0.7 - 2.0 mmol/L   Result Value Ref Range    Troponin I 0.02 0.00 - 0.29 ng/mL   Result Value Ref Range    Magnesium 1.4 (L) 1.8 - 2.6 mg/dL   UA with Microscopic reflex to Culture    Specimen: Urine, Midstream   Result Value Ref Range    Color Urine Light Yellow Colorless, Straw, Light Yellow, Yellow    Appearance Urine Clear Clear    Glucose Urine Negative Negative mg/dL    Bilirubin Urine Negative Negative    Ketones Urine Negative Negative mg/dL    Specific Gravity Urine 1.011 1.001 - 1.030    Blood Urine Negative Negative    pH Urine 7.0 5.0 - 7.0    Protein Albumin Urine Negative Negative mg/dL    Urobilinogen Urine <2.0 <2.0 mg/dL    Nitrite Urine Positive (A) Negative    Leukocyte Esterase Urine 250 Raya/uL (A) Negative    Bacteria Urine Few (A) None Seen /HPF    RBC Urine 0 <=2 /HPF    WBC Urine 47 (H) <=5 /HPF    Squamous Epithelials Urine <1 <=1 /HPF   CBC with platelets and differential   Result Value Ref Range    WBC Count 3.8 (L) 4.0 - 11.0 10e3/uL    RBC Count 4.48 4.40 - 5.90 10e6/uL    Hemoglobin 14.7 13.3 - 17.7 g/dL    Hematocrit 41.9 40.0 - 53.0 %    MCV 94 78 - 100 fL    MCH 32.8 26.5 - 33.0 pg    MCHC 35.1 31.5 - 36.5 g/dL    RDW 13.6 10.0 - 15.0 %    Platelet Count 262 150 - 450 10e3/uL    % Neutrophils 45 %    % Lymphocytes 34 %    % Monocytes 18 %    % Eosinophils 3 %    % Basophils 0 %    % Immature Granulocytes 0 %    NRBCs per 100 WBC 0 <1 /100    Absolute Neutrophils 1.7 1.6 - 8.3 10e3/uL    Absolute Lymphocytes 1.3 0.8 - 5.3 10e3/uL    Absolute  Monocytes 0.7 0.0 - 1.3 10e3/uL    Absolute Eosinophils 0.1 0.0 - 0.7 10e3/uL    Absolute Basophils 0.0 0.0 - 0.2 10e3/uL    Absolute Immature Granulocytes 0.0 <=0.4 10e3/uL    Absolute NRBCs 0.0 10e3/uL   Extra Blue Top Tube   Result Value Ref Range    Hold Specimen JIC    Extra Red Top Tube   Result Value Ref Range    Hold Specimen JIC    Extra Green Top (Lithium Heparin) Tube   Result Value Ref Range    Hold Specimen JIC    Extra Purple Top Tube   Result Value Ref Range    Hold Specimen JIC    Symptomatic; Yes; 5/26/2022 COVID-19 Virus (Coronavirus) by PCR Nasopharyngeal    Specimen: Nasopharyngeal; Swab   Result Value Ref Range    SARS CoV2 PCR Negative Negative       RADIOLOGY:  Reviewed all pertinent imaging. Please see official radiology report.  MR Lumbar Spine w/o & w Contrast   Final Result   IMPRESSION:   1.  Advanced lumbar spondylosis with high-grade canal stenosis at L2-L3 and L3-L4 and multilevel moderate or high-grade foraminal stenosis as described.      2.  There is mild chronic height loss anterior-inferior aspect of L1. There is fluid signal within the central and ventral aspect of the L1-L2 intervertebral disc space with marginal endplate enhancement at this level. Although fluid signal is seen in    the disc space in the setting of infection, the appearance of associated chronic endplate degenerative changes at this level, lack of endplate edema, and lack of prevertebral fat stranding favor degenerative change.      3.  Prior decompressive laminectomies at L1-L2 and L4-L5 and L5-S1. Canal is well decompressed dorsally at these levels.      MR Brain w/o Contrast   Final Result   IMPRESSION:   1.  No finding for intracranial hemorrhage, mass, or acute infarct.      2.  Mild to moderate volume loss and mild presumed sequela chronic microvascular ischemic change.      3.  Moderate fluid or mucosal thickening left mastoid sinus compatible with inflammatory change and similar to prior.          EKG:     Performed at: 3:13 PM  Impression: Tachycardia, no acute ischemic changes  Rate: 103  Rhythm: Sinus  Axis: Normal  ME Interval: 103  QRS Interval: 153  QTc Interval: 445  ST Changes: No acute ischemic changes  Comparison: May 2022, no acute changes    I have independently reviewed and interpreted the EKG(s) documented above.    I, Martín Martinez, am serving as a scribe to document services personally performed by Dr. Yarbrough based on my observation and the provider's statements to me. I, Qasim Yarbrough MD attest that Martín Martinez is acting in a scribe capacity, has observed my performance of the services and has documented them in accordance with my direction.    Qasim Yarbrough M.D.  Emergency Medicine  [facility] EMERGENCY DEPARTMENT  [facility] [facility] [address]  [phone]  Dept: [phone]       Qasim Yarbrough MD  06/09/22 [id number]

## 2022-06-09 NOTE — PHARMACY-ADMISSION MEDICATION HISTORY
Pharmacy Note - Admission Medication History    Pertinent Provider Information: recent lisinopril dose reduction     ______________________________________________________________________    Prior To Admission (PTA) med list completed and updated in EMR.       PTA Med List   Medication Sig Note Last Dose     albuterol (PROAIR HFA/PROVENTIL HFA/VENTOLIN HFA) 108 (90 Base) MCG/ACT inhaler Inhale 2 puffs into the lungs every 4 hours as needed for shortness of breath / dyspnea or wheezing       aspirin 81 MG EC tablet [ASPIRIN 81 MG EC TABLET] Take 81 mg by mouth daily.  6/9/2022 at 1200     atorvastatin (LIPITOR) 40 MG tablet [ATORVASTATIN (LIPITOR) 40 MG TABLET] Take 40 mg by mouth daily.  6/9/2022 at 1200     DULoxetine (CYMBALTA) 60 MG capsule Take 120 mg by mouth daily  6/9/2022 at 1200     hydrOXYzine (ATARAX) 25 MG tablet Take 25-50 mg by mouth every 8 hours as needed for itching  6/9/2022 at 1200     insulin glargine (LANTUS; BASAGLAR) 100 unit/mL (3 mL) pen [INSULIN GLARGINE (LANTUS; BASAGLAR) 100 UNIT/ML (3 ML) PEN] Inject 15 Units under the skin every morning.  6/9/2022 at 1200     Lidocaine (LIDOCARE) 4 % Patch Place 1 patch onto the skin every 24 hours To prevent lidocaine toxicity, patient should be patch free for 12 hrs daily. 6/9/2022: Back. They don't stick well but they do provide relief when on -per pt. testimonial Past Week     lisinopril (ZESTRIL) 2.5 MG tablet Take 4 tablets (10 mg) by mouth daily (Patient taking differently: Take 5 mg by mouth daily)  6/9/2022 at 1200       Information source(s): Patient, Clinic records, Hospital records and [facility]  Method of interview communication: in-person    Summary of Changes to PTA Med List  New: none  Discontinued: none  Changed: lisinopril     Patient was asked about OTC/herbal products specifically.  PTA med list reflects this.    In the past week, patient estimated taking medication this percent of the time:  greater than  90%.    Allergies were reviewed, assessed, and updated with the patient.      Patient does not anticipate needing any multi-use medications during admission.    The information provided in this note is only as accurate as the sources available at the time of the update(s).    Thank you for the opportunity to participate in the care of this patient.    Luis Narvaez [facility]  6/9/2022 4:07 PM

## 2022-06-09 NOTE — ED TRIAGE NOTES
Pt reports short of breath when walking, dizziness for 2 wks, increased dizziness today, increase in chronic back pain.     Triage Assessment     Row Name 06/09/22 5466       Triage Assessment (Adult)    Airway WDL WDL       Respiratory WDL    Respiratory WDL WDL       Skin Circulation/Temperature WDL    Skin Circulation/Temperature WDL WDL       Cardiac WDL    Cardiac WDL X  hr 120 /min       Peripheral/Neurovascular WDL    Peripheral Neurovascular WDL X  ble/ bue numbness since back surgeries       Cognitive/Neuro/Behavioral WDL    Cognitive/Neuro/Behavioral WDL WDL       Mateus Coma Scale    Best Eye Response 4-->(E4) spontaneous    Best Motor Response 6-->(M6) obeys commands    Best Verbal Response 5-->(V5) oriented    Mateus Coma Scale Score 15

## 2022-06-10 ENCOUNTER — APPOINTMENT (OUTPATIENT)
Dept: PHYSICAL THERAPY | Facility: HOSPITAL | Age: 72
DRG: 552 | End: 2022-06-10
Attending: STUDENT IN AN ORGANIZED HEALTH CARE EDUCATION/TRAINING PROGRAM
Payer: MEDICARE

## 2022-06-10 LAB
ATRIAL RATE - MUSE: 103 BPM
CORTIS SERPL-MCNC: 26 UG/DL
DIASTOLIC BLOOD PRESSURE - MUSE: 84 MMHG
ETHANOL SERPL-MCNC: <10 MG/DL
GLUCOSE BLDC GLUCOMTR-MCNC: 103 MG/DL (ref 70–99)
GLUCOSE BLDC GLUCOMTR-MCNC: 141 MG/DL (ref 70–99)
GLUCOSE BLDC GLUCOMTR-MCNC: 181 MG/DL (ref 70–99)
GLUCOSE BLDC GLUCOMTR-MCNC: 193 MG/DL (ref 70–99)
INTERPRETATION ECG - MUSE: NORMAL
LACTATE SERPL-SCNC: 0.8 MMOL/L (ref 0.7–2)
MAGNESIUM SERPL-MCNC: 1.8 MG/DL (ref 1.8–2.6)
OSMOLALITY SERPL: 277 MMOL/KG (ref 280–301)
OSMOLALITY UR: 333 MMOL/KG (ref 100–1200)
P AXIS - MUSE: 33 DEGREES
PR INTERVAL - MUSE: 154 MS
QRS DURATION - MUSE: 72 MS
QT - MUSE: 340 MS
QTC - MUSE: 445 MS
R AXIS - MUSE: -12 DEGREES
SODIUM UR-SCNC: 72 MMOL/L
SYSTOLIC BLOOD PRESSURE - MUSE: 133 MMHG
T AXIS - MUSE: 54 DEGREES
TSH SERPL DL<=0.005 MIU/L-ACNC: 6.25 UIU/ML (ref 0.3–5)
VENTRICULAR RATE- MUSE: 103 BPM
VIT B12 SERPL-MCNC: 430 PG/ML (ref 213–816)

## 2022-06-10 PROCEDURE — 99207 PR NO BILLABLE SERVICE THIS VISIT: CPT | Performed by: INTERNAL MEDICINE

## 2022-06-10 PROCEDURE — G0378 HOSPITAL OBSERVATION PER HR: HCPCS

## 2022-06-10 PROCEDURE — 83605 ASSAY OF LACTIC ACID: CPT | Performed by: STUDENT IN AN ORGANIZED HEALTH CARE EDUCATION/TRAINING PROGRAM

## 2022-06-10 PROCEDURE — 99223 1ST HOSP IP/OBS HIGH 75: CPT | Performed by: NURSE PRACTITIONER

## 2022-06-10 PROCEDURE — 250N000013 HC RX MED GY IP 250 OP 250 PS 637: Performed by: INTERNAL MEDICINE

## 2022-06-10 PROCEDURE — 83930 ASSAY OF BLOOD OSMOLALITY: CPT | Performed by: INTERNAL MEDICINE

## 2022-06-10 PROCEDURE — 36415 COLL VENOUS BLD VENIPUNCTURE: CPT | Performed by: INTERNAL MEDICINE

## 2022-06-10 PROCEDURE — 96372 THER/PROPH/DIAG INJ SC/IM: CPT | Performed by: STUDENT IN AN ORGANIZED HEALTH CARE EDUCATION/TRAINING PROGRAM

## 2022-06-10 PROCEDURE — 120N000001 HC R&B MED SURG/OB

## 2022-06-10 PROCEDURE — 82962 GLUCOSE BLOOD TEST: CPT

## 2022-06-10 PROCEDURE — 250N000011 HC RX IP 250 OP 636: Performed by: STUDENT IN AN ORGANIZED HEALTH CARE EDUCATION/TRAINING PROGRAM

## 2022-06-10 PROCEDURE — 83735 ASSAY OF MAGNESIUM: CPT | Performed by: INTERNAL MEDICINE

## 2022-06-10 PROCEDURE — 250N000013 HC RX MED GY IP 250 OP 250 PS 637: Performed by: STUDENT IN AN ORGANIZED HEALTH CARE EDUCATION/TRAINING PROGRAM

## 2022-06-10 PROCEDURE — 36415 COLL VENOUS BLD VENIPUNCTURE: CPT | Performed by: STUDENT IN AN ORGANIZED HEALTH CARE EDUCATION/TRAINING PROGRAM

## 2022-06-10 PROCEDURE — 250N000012 HC RX MED GY IP 250 OP 636 PS 637: Performed by: STUDENT IN AN ORGANIZED HEALTH CARE EDUCATION/TRAINING PROGRAM

## 2022-06-10 PROCEDURE — 97162 PT EVAL MOD COMPLEX 30 MIN: CPT | Mod: GP

## 2022-06-10 PROCEDURE — 250N000009 HC RX 250: Performed by: INTERNAL MEDICINE

## 2022-06-10 PROCEDURE — 99222 1ST HOSP IP/OBS MODERATE 55: CPT | Mod: AI | Performed by: STUDENT IN AN ORGANIZED HEALTH CARE EDUCATION/TRAINING PROGRAM

## 2022-06-10 RX ORDER — NICOTINE POLACRILEX 4 MG
15-30 LOZENGE BUCCAL
Status: DISCONTINUED | OUTPATIENT
Start: 2022-06-10 | End: 2022-06-12 | Stop reason: HOSPADM

## 2022-06-10 RX ORDER — LIDOCAINE 50 MG/G
OINTMENT TOPICAL 4 TIMES DAILY
Status: DISCONTINUED | OUTPATIENT
Start: 2022-06-10 | End: 2022-06-12 | Stop reason: HOSPADM

## 2022-06-10 RX ORDER — DEXTROSE MONOHYDRATE 25 G/50ML
25-50 INJECTION, SOLUTION INTRAVENOUS
Status: DISCONTINUED | OUTPATIENT
Start: 2022-06-10 | End: 2022-06-12 | Stop reason: HOSPADM

## 2022-06-10 RX ORDER — CALCIUM CARBONATE 500 MG/1
1000 TABLET, CHEWABLE ORAL 4 TIMES DAILY PRN
Status: DISCONTINUED | OUTPATIENT
Start: 2022-06-10 | End: 2022-06-12 | Stop reason: HOSPADM

## 2022-06-10 RX ORDER — PREGABALIN 25 MG/1
25 CAPSULE ORAL 2 TIMES DAILY
Status: DISCONTINUED | OUTPATIENT
Start: 2022-06-10 | End: 2022-06-12 | Stop reason: HOSPADM

## 2022-06-10 RX ORDER — HYDROXYZINE HYDROCHLORIDE 25 MG/1
25-50 TABLET, FILM COATED ORAL EVERY 8 HOURS PRN
Status: DISCONTINUED | OUTPATIENT
Start: 2022-06-10 | End: 2022-06-12 | Stop reason: HOSPADM

## 2022-06-10 RX ORDER — DULOXETIN HYDROCHLORIDE 60 MG/1
120 CAPSULE, DELAYED RELEASE ORAL DAILY
Status: DISCONTINUED | OUTPATIENT
Start: 2022-06-10 | End: 2022-06-12 | Stop reason: HOSPADM

## 2022-06-10 RX ORDER — MAGNESIUM OXIDE 400 MG/1
400 TABLET ORAL 3 TIMES DAILY
Status: COMPLETED | OUTPATIENT
Start: 2022-06-10 | End: 2022-06-12

## 2022-06-10 RX ORDER — CEFTRIAXONE 1 G/1
1 INJECTION, POWDER, FOR SOLUTION INTRAMUSCULAR; INTRAVENOUS EVERY 24 HOURS
Status: DISCONTINUED | OUTPATIENT
Start: 2022-06-10 | End: 2022-06-12

## 2022-06-10 RX ORDER — ACETAMINOPHEN 325 MG/1
650 TABLET ORAL DAILY PRN
Status: DISCONTINUED | OUTPATIENT
Start: 2022-06-10 | End: 2022-06-12 | Stop reason: HOSPADM

## 2022-06-10 RX ORDER — LIDOCAINE 40 MG/G
CREAM TOPICAL
Status: DISCONTINUED | OUTPATIENT
Start: 2022-06-10 | End: 2022-06-12 | Stop reason: HOSPADM

## 2022-06-10 RX ORDER — ALBUTEROL SULFATE 90 UG/1
2 AEROSOL, METERED RESPIRATORY (INHALATION) EVERY 4 HOURS PRN
Status: DISCONTINUED | OUTPATIENT
Start: 2022-06-10 | End: 2022-06-12 | Stop reason: HOSPADM

## 2022-06-10 RX ORDER — NICOTINE 21 MG/24HR
1 PATCH, TRANSDERMAL 24 HOURS TRANSDERMAL DAILY
Status: DISCONTINUED | OUTPATIENT
Start: 2022-06-10 | End: 2022-06-12 | Stop reason: HOSPADM

## 2022-06-10 RX ORDER — METHOCARBAMOL 500 MG/1
500 TABLET, FILM COATED ORAL 4 TIMES DAILY
Status: DISCONTINUED | OUTPATIENT
Start: 2022-06-10 | End: 2022-06-10

## 2022-06-10 RX ORDER — ACETAMINOPHEN 325 MG/1
975 TABLET ORAL 3 TIMES DAILY
Status: DISCONTINUED | OUTPATIENT
Start: 2022-06-10 | End: 2022-06-12 | Stop reason: HOSPADM

## 2022-06-10 RX ORDER — ASPIRIN 81 MG/1
81 TABLET ORAL DAILY
Status: DISCONTINUED | OUTPATIENT
Start: 2022-06-10 | End: 2022-06-12 | Stop reason: HOSPADM

## 2022-06-10 RX ORDER — AMOXICILLIN 250 MG
1 CAPSULE ORAL 2 TIMES DAILY PRN
Status: DISCONTINUED | OUTPATIENT
Start: 2022-06-10 | End: 2022-06-12 | Stop reason: HOSPADM

## 2022-06-10 RX ORDER — LISINOPRIL 5 MG/1
5 TABLET ORAL DAILY
Status: DISCONTINUED | OUTPATIENT
Start: 2022-06-10 | End: 2022-06-11

## 2022-06-10 RX ORDER — METHOCARBAMOL 500 MG/1
500 TABLET, FILM COATED ORAL 4 TIMES DAILY PRN
Status: DISCONTINUED | OUTPATIENT
Start: 2022-06-10 | End: 2022-06-12 | Stop reason: HOSPADM

## 2022-06-10 RX ORDER — ATORVASTATIN CALCIUM 40 MG/1
40 TABLET, FILM COATED ORAL DAILY
Status: DISCONTINUED | OUTPATIENT
Start: 2022-06-10 | End: 2022-06-12 | Stop reason: HOSPADM

## 2022-06-10 RX ORDER — AMOXICILLIN 250 MG
2 CAPSULE ORAL 2 TIMES DAILY PRN
Status: DISCONTINUED | OUTPATIENT
Start: 2022-06-10 | End: 2022-06-12 | Stop reason: HOSPADM

## 2022-06-10 RX ADMIN — OXYCODONE HYDROCHLORIDE 2.5 MG: 5 TABLET ORAL at 09:13

## 2022-06-10 RX ADMIN — INSULIN GLARGINE 10 UNITS: 100 INJECTION, SOLUTION SUBCUTANEOUS at 08:46

## 2022-06-10 RX ADMIN — LIDOCAINE: 50 OINTMENT TOPICAL at 21:51

## 2022-06-10 RX ADMIN — INSULIN ASPART 1 UNITS: 100 INJECTION, SOLUTION INTRAVENOUS; SUBCUTANEOUS at 17:04

## 2022-06-10 RX ADMIN — DULOXETINE 120 MG: 60 CAPSULE, DELAYED RELEASE ORAL at 08:37

## 2022-06-10 RX ADMIN — Medication 400 MG: at 13:13

## 2022-06-10 RX ADMIN — METHOCARBAMOL 500 MG: 500 TABLET ORAL at 17:06

## 2022-06-10 RX ADMIN — ACETAMINOPHEN 975 MG: 325 TABLET ORAL at 16:25

## 2022-06-10 RX ADMIN — ACETAMINOPHEN 975 MG: 325 TABLET ORAL at 21:49

## 2022-06-10 RX ADMIN — PREGABALIN 25 MG: 25 CAPSULE ORAL at 21:50

## 2022-06-10 RX ADMIN — INSULIN ASPART 1 UNITS: 100 INJECTION, SOLUTION INTRAVENOUS; SUBCUTANEOUS at 08:45

## 2022-06-10 RX ADMIN — DICLOFENAC SODIUM 2 G: 10 GEL TOPICAL at 19:51

## 2022-06-10 RX ADMIN — ATORVASTATIN CALCIUM 40 MG: 40 TABLET, FILM COATED ORAL at 08:37

## 2022-06-10 RX ADMIN — Medication 400 MG: at 19:50

## 2022-06-10 RX ADMIN — CEFTRIAXONE SODIUM 1 G: 1 INJECTION, POWDER, FOR SOLUTION INTRAMUSCULAR; INTRAVENOUS at 21:53

## 2022-06-10 RX ADMIN — INSULIN ASPART 2 UNITS: 100 INJECTION, SOLUTION INTRAVENOUS; SUBCUTANEOUS at 13:13

## 2022-06-10 RX ADMIN — LISINOPRIL 5 MG: 5 TABLET ORAL at 08:37

## 2022-06-10 RX ADMIN — METHOCARBAMOL 500 MG: 500 TABLET ORAL at 23:18

## 2022-06-10 RX ADMIN — METHOCARBAMOL 500 MG: 500 TABLET ORAL at 13:13

## 2022-06-10 RX ADMIN — ASPIRIN 81 MG: 81 TABLET, COATED ORAL at 08:37

## 2022-06-10 RX ADMIN — METHOCARBAMOL 500 MG: 500 TABLET ORAL at 08:37

## 2022-06-10 RX ADMIN — Medication 1 MG: at 23:34

## 2022-06-10 RX ADMIN — NICOTINE 1 PATCH: 14 PATCH, EXTENDED RELEASE TRANSDERMAL at 14:40

## 2022-06-10 NOTE — PLAN OF CARE
Problem: Plan of Care - These are the overarching goals to be used throughout the patient stay.    Goal: Readiness for Transition of Care  Outcome: Ongoing, Progressing     Problem: Plan of Care - These are the overarching goals to be used throughout the patient stay.    Goal: Optimal Comfort and Wellbeing  Outcome: Ongoing, Progressing   Goal Outcome Evaluation:      Pt is alert and oriented. Pt's chronic low back pain managed with PRN acetaminophen and oxycodone. Pt voided 800 mL. Orthostatic BP completed. Systolic BP dropped by 17 points from supine to sitting. No significant drop noted from sitting to standing. BG was 141 mg/dL. No other concerns noted.  Srinivasa Mccarthy RN  6/10/2022  11:18 AM

## 2022-06-10 NOTE — H&P
[facility]    History and Physical - Hospitalist Service       Date of Admission:  6/9/2022    Assessment & Plan      Donovan Jane is a 71 year old male admitted on 6/9/2022.     71-year-old male past medical history of chronic back pain with multiple previous back surgeries, alcohol use disorder, type II DM, hypertension, obesity presents with worsening back pain and dizziness    Acute on chronic back pain, with neurogenic claudication  -history of multiple previous decompressive laminectomy, MRI with advanced spondylosis and high-grade canal stenosis at L2/L3-L3/L4.  Pain controlled, PT/OT, neurosurgery and pain team consult      Dizziness/unsteady gaits  -Recently admitted for same, work-ups were unremarkable.  Suspect related to neurogenic claudication with unsteady gait secondary to spinal stenosis.  Will check orthostatic vitals,PT/OT, check vitamin B12, IV hydration    Urinary tract infection.   -IV ceftriaxone pending culture results      Hypomagnesemia  -Magnesium 1.4, replace per protocol    Hyponatremia  -Chronic, likely related to alcohol use-beer potomonia.  Monitor BMP    Elevated lactic acid  -Mildly elevated, will repeat after IV hydration           Diet: Combination Diet Regular Diet Adult  DVT Prophylaxis: Ambulate every shift  Hernández Catheter: Not present  Central Lines: None  Cardiac Monitoring: None  Code Status: Full Code    Clinically Significant Risk Factors Present on Admission         # Hyponatremia: Na = 128 mmol/L (Ref range: 136 - 145 mmol/L) on admission, will monitor as appropriate  # Hypercalcemia: Ca = 10.2 mg/dL (Ref range: 8.5 - 10.5 mg/dL) and/or iCa = N/A on admission, will monitor as appropriate  # Hypomagnesemia: Mg = 1.4 mg/dL (Ref range: 1.8 - 2.6 mg/dL) on admission, will replace as needed      # Platelet Defect: home medication list includes an antiplatelet medication   # Overweight: Estimated body mass index is 29.97 kg/m  as calculated from the  "following:    Height as of this encounter: 1.74 m (5' 8.5\").    Weight as of this encounter: 90.7 kg (200 lb).      Disposition Plan   Expected Discharge:  2 days  Anticipated discharge location:  Awaiting care coordination huddle       The patient's care was discussed with the Patient.    Jorge Hutson MD  Hospitalist Service  [facility]  Securely message with the Vocera Web Console (learn more here)  Text page via [facility] Paging/Directory         ______________________________________________________________________    Chief Complaint   Worsening back pain, unsteady gait and dizziness    History is obtained from the patient    History of Present Illness   Donovan Jane is a 71-year-old male past medical history of chronic back pain with multiple previous back surgeries, alcohol use disorder, type II DM, hypertension, obesity presents with worsening back pain and dizziness.  He reports having worsening chronic back pain over the last several months.  Now having severe bilateral lower extremity pain, bilateral lower extremity weakness and and unsteady gait after walking very short distance.  Also reports having poor urinary stream over the last 2 months and difficulty initiating micturition.  Denies any fever or chills.  Recently admitted for  to [facility] 5/19-5/20 following dizziness and syncopal episode with work-up at that time were unremarkable.      Review of Systems    The 10 point Review of Systems is negative other than noted in the HPI or here.     Past Medical History    I have reviewed this patient's medical history and updated it with pertinent information if needed.   History reviewed. No pertinent past medical history.    Past Surgical History   I have reviewed this patient's surgical history and updated it with pertinent information if needed.  Past Surgical History:   Procedure Laterality Date     BACK SURGERY         Social History   I have reviewed this " patient's social history and updated it with pertinent information if needed.  Social History     Tobacco Use     Smoking status: Former Smoker     Packs/day: 1.50     Years: 15.00     Pack years: 22.50     Quit date: 7/23/2018     Years since quitting: 3.8     Smokeless tobacco: Never Used   Substance Use Topics     Alcohol use: Yes     Drug use: Yes     Types: Marijuana       Family History   Reviewed, noncontributory to current complaint    Prior to Admission Medications   Prior to Admission Medications   Prescriptions Last Dose Informant Patient Reported? Taking?   DULoxetine (CYMBALTA) 60 MG capsule 6/9/2022 at 1200  Yes Yes   Sig: Take 120 mg by mouth daily   Lidocaine (LIDOCARE) 4 % Patch Past Week  No Yes   Sig: Place 1 patch onto the skin every 24 hours To prevent lidocaine toxicity, patient should be patch free for 12 hrs daily.   albuterol (PROAIR HFA/PROVENTIL HFA/VENTOLIN HFA) 108 (90 Base) MCG/ACT inhaler   Yes Yes   Sig: Inhale 2 puffs into the lungs every 4 hours as needed for shortness of breath / dyspnea or wheezing   aspirin 81 MG EC tablet 6/9/2022 at 1200  Yes Yes   Sig: [ASPIRIN 81 MG EC TABLET] Take 81 mg by mouth daily.   atorvastatin (LIPITOR) 40 MG tablet 6/9/2022 at 1200  Yes Yes   Sig: [ATORVASTATIN (LIPITOR) 40 MG TABLET] Take 40 mg by mouth daily.   hydrOXYzine (ATARAX) 25 MG tablet 6/9/2022 at 1200  Yes Yes   Sig: Take 25-50 mg by mouth every 8 hours as needed for itching   insulin glargine (LANTUS; BASAGLAR) 100 unit/mL (3 mL) pen 6/9/2022 at 1200  Yes Yes   Sig: [INSULIN GLARGINE (LANTUS; BASAGLAR) 100 UNIT/ML (3 ML) PEN] Inject 15 Units under the skin every morning.   lisinopril (ZESTRIL) 2.5 MG tablet 6/9/2022 at 1200  No Yes   Sig: Take 4 tablets (10 mg) by mouth daily   Patient taking differently: Take 5 mg by mouth daily      Facility-Administered Medications: None     Allergies   No Known Allergies    Physical Exam   Vital Signs: Temp: 98.1  F (36.7  C) Temp src: Oral BP: (!)  158/87 Pulse: 90   Resp: 24 SpO2: 97 % O2 Device: None (Room air)    Weight: 200 lbs 0 oz    General Appearance: Not in distress  Eyes: Pink conjunctiva  HEENT: PERRLA  Respiratory: Bilateral clear chest  Cardiovascular: S1, S2 no murmur heard  GI: Soft nontender abdomen  Genitourinary: Mild SP tenderness  Skin: No skin rash  Musculoskeletal: No peripheral edema  Neurologic: AOx3, bilateral lower extremity sensation intact, position sense bilaterally intact      Data   Data reviewed today: I reviewed all medications, new labs and imaging results over the last 24 hours. I personally reviewed     Recent Labs   Lab 06/09/22  1503   WBC 3.8*   HGB 14.7   MCV 94      INR 1.00   *   POTASSIUM 4.4   CHLORIDE 96*   CO2 22   BUN 10   CR 1.12   ANIONGAP 10   OSCAR 10.2   *   ALBUMIN 3.9   PROTTOTAL 7.2   BILITOTAL 0.5   ALKPHOS 78   ALT 15   AST 18   LIPASE 115*     Recent Results (from the past 24 hour(s))   MR Brain w/o Contrast    Narrative    EXAM: MR BRAIN W/O CONTRAST  LOCATION: [facility]  DATE/TIME: 6/9/2022 5:43 PM    INDICATION: Vertigo.  COMPARISON: MRI brain 05/21/2022.  TECHNIQUE: Routine multiplanar multisequence head MRI without intravenous contrast.    FINDINGS:  INTRACRANIAL CONTENTS: No areas of abnormally restricted diffusion to suggest acute or subacute infarct. No areas of abnormal parenchymal susceptibility.    Mild prominence of the lateral ventricles and sulci. Small amount of FLAIR hyperintensity seen within the periventricular white matter and central zari, nonspecific but compatible with areas of gliosis or chronic myelin loss. Cerebellar tonsils are   normally positioned. Visualized upper cervical spinal cord, sella, and corpus callosum are unremarkable. Major skull base vascular flow voids are preserved.    OSSEOUS STRUCTURES/SOFT TISSUES: Normal marrow signal.    ORBITS: No abnormality accounting for technique.     SINUSES/MASTOIDS: Mild paranasal sinus  mucosal thickening. Moderate fluid or mucosal thickening left mastoid tip compatible with inflammatory change. No finding for an obstructing lesion at the level the left fossa of Rosenmuller.      Impression    IMPRESSION:  1.  No finding for intracranial hemorrhage, mass, or acute infarct.    2.  Mild to moderate volume loss and mild presumed sequela chronic microvascular ischemic change.    3.  Moderate fluid or mucosal thickening left mastoid sinus compatible with inflammatory change and similar to prior.   MR Lumbar Spine w/o & w Contrast    Narrative    EXAM: MR LUMBAR SPINE W/O and W CONTRAST  LOCATION: [facility]  DATE/TIME: 6/9/2022 5:42 PM    INDICATION: Back pain. Sagittal anesthesia. Incontinence.  COMPARISON: None.  CONTRAST: 10ml Gadavist  TECHNIQUE: Routine Lumbar Spine MRI without and with IV contrast.    FINDINGS:   Nomenclature is based on 5 lumbar type vertebral bodies. There is mild convex right curvature centered at the L1-L2 level with slight rightward subluxation of L2 on L3. Slight retrolisthesis L1 on L2. Otherwise normal lumbar lordosis.    There is approximately 30% chronic height loss along the anterior-inferior aspect of L1. Slight height loss central superior endplate of L2. Prominent ventral spurring at these levels and extensive Modic type III changes/sclerosis are seen along the   apposing endplates. Fluid signal is seen within the central and ventral aspect of the disc space and there is a small amount of enhancement along the apposing endplates. Although fluid signal within the disc may be seen in the setting of infection, given   the associated facet degenerative changes at this level and lack of stranding of the prevertebral soft tissues, findings are favored to be reflect chronic degenerative change rather than infection.    There is ankylosis across the ventral aspect of L5-S1. Modic type II changes are seen posteriorly at L4-L5 and L3-L4. Vertebral body  heights are otherwise preserved. No pars defects.    Postsurgical changes are seen at the L4 and L5 levels and patient is status post left-sided decompressive laminectomy at L4 and left-sided laminectomy and medial facetectomy at L5. Left-sided laminectomy and medial facetectomy L1-L2. There is atrophy of   the paramedian dorsal paraspinal musculature with more generalized atrophy and below the L4 level. Psoas muscles are normal in appearance. Visualized aorta is normal in caliber. Visualized osseous pelvis is unremarkable.    Distal spinal cord is unremarkable. Conus terminates at the upper L1 level.    T12-L1: Moderate interspace narrowing. Loss of normal disc T2 prolongation. Shallow posterior disc bulge. No facet arthropathy. No spinal canal or neural foraminal stenosis.    L1-L2: Advanced interspace narrowing. Fluid signal is seen within the central and ventral aspect of the interspace. Mild posterior interbody spurring with slight retrolisthesis L1 on L2. Prior decompressive laminectomy and left-sided facetectomy. Mild   right-sided facet arthropathy. Canal is well decompressed dorsally. Mild right foraminal stenosis. Mild left foraminal stenosis.    L2-L3: Mild to moderate interspace narrowing. Loss of normal disc T2 prolongation. Moderate-sized broad-based posterior disc bulge partially effaces the caudal foramina. Mild facet arthropathy and ligamentum flavum thickening. Severe spinal canal   stenosis (axial image 27). Moderate right and mild left neural foraminal stenosis.    L3-L4: Mild posterior interspace narrowing. Loss of normal disc T2 prolongation. Shallow broad-based posterior disc herniation. Moderate right and mild left-sided facet arthropathy and ligamentum flavum thickening. Severe spinal canal stenosis, greater   on the right. Moderate to severe right foraminal stenosis. Moderate left foraminal stenosis.    L4-L5: Mild interspace narrowing. Loss of normal disc T2 prolongation. Shallow  broad-based posterior disc bulge with small left paracentral annular fissure. Prior left-sided laminectomy. Advanced right and mild left-sided facet arthropathy with ankylosis   across the facet on the right.  Lateral recess narrowing without spinal canal stenosis. No spinal canal stenosis. Severe right foraminal stenosis. Moderate to severe left foraminal stenosis.    L5-S1: Advanced interspace narrowing. Partial ankylosis across the interspace. Mild posterior interbody spurring. Prior laminectomy and left-sided medial facetectomy. Moderate right-sided facet arthropathy with probable ankylosis across the facet.   Heterogeneous signal along the medial aspect of the residual left-sided facet is favored to reflect granulation tissue along a slightly dilated left S1 nerve root and favored to reflect postoperative change. No spinal canal stenosis. No right foraminal   stenosis. No left foraminal stenosis.      Impression    IMPRESSION:  1.  Advanced lumbar spondylosis with high-grade canal stenosis at L2-L3 and L3-L4 and multilevel moderate or high-grade foraminal stenosis as described.    2.  There is mild chronic height loss anterior-inferior aspect of L1. There is fluid signal within the central and ventral aspect of the L1-L2 intervertebral disc space with marginal endplate enhancement at this level. Although fluid signal is seen in   the disc space in the setting of infection, the appearance of associated chronic endplate degenerative changes at this level, lack of endplate edema, and lack of prevertebral fat stranding favor degenerative change.    3.  Prior decompressive laminectomies at L1-L2 and L4-L5 and L5-S1. Canal is well decompressed dorsally at these levels.

## 2022-06-10 NOTE — PLAN OF CARE
"PRIMARY DIAGNOSIS: \"GENERIC\" NURSING  OUTPATIENT/OBSERVATION GOALS TO BE MET BEFORE DISCHARGE:  ADLs back to baseline: No    Activity and level of assistance: Up with standby assistance.    Pain status: Improved-controlled with oral pain medications.    Return to near baseline physical activity: No     Discharge Planner Nurse   Safe discharge environment identified: No  Barriers to discharge: Yes    safe discharge environment needed and still having electrolytes and pain monitored.        Entered by: Azucena Back RN 06/10/2022 3:26 PM     Please review provider order for any additional goals.   Nurse to notify provider when observation goals have been met and patient is ready for discharge.Goal Outcome Evaluation:      Patient alert and oriented. Requested nicotine patch, received order and placed on left arm. Patient given robaxin right before transfer from ER. Azucena Back RN                 "

## 2022-06-10 NOTE — UTILIZATION REVIEW
"Admission Status; Secondary Review Determination     Under the authority of the Utilization Management Committee, the utilization review process indicated a secondary review on Donovan Jane.  The review outcome is based on review of the medical records, discussions with staff, and applying clinical experience noted on the date of the review.     (x) Observation Status Appropriate - This patient does not meet hospital inpatient criteria and is placed in observation status. If this patient's primary payer is Medicare and was admitted as an inpatient, Condition Code 44 should be used and patient status changed to \"observation\".     RATIONALE FOR DETERMINATION   71 yr old male presented 6/9/22 with acute on chronic back pain.  Pain team consulting.  Some dizziness but chronic.  Other medical conditions stable.  No IV pain meds.    The severity of illness, intensity of service provided, expected LOS and risk for adverse outcome make the care appropriate for further observation; however, doesn't meet criteria for hospital inpatient admission. Dr Malagon notified of this determination and agrees with downgrade of status.      The information on this document is developed by the utilization review team in order for the business office to ensure compliance.  This only denotes the appropriateness of proper admission status and does not reflect the quality of care rendered.         The definitions of Inpatient Status and Observation Status used in making the determination above are those provided in the CMS Coverage Manual, Chapter 1 and Chapter 6, section 70.4.      Sincerely,  Andreina Giraldo MD  Utilization Review  Physician Advisor  [facility]    "

## 2022-06-10 NOTE — PROGRESS NOTES
[facility] ACUTE PAIN SERVICE    ([facility], [facility], [facility])   Consult Note    Date of Admission:  6/9/2022  Date of Consult: 06/10/22  Physician requesting consult: Jorge Hutson MD  Reason for consult: chronic back pain     Assessment/Plan:     Donovan Jane is a 71 year old male who was admitted on 6/9/2022.  I was asked to see the patient for chronic back pain. Admitted for acute cystitis, lightheadedness, hyponatremia, hypomagnesemia. History of back surgery, alcohol use disorder, diabetes mellitus associated with pancreatic disease, HTN, obesity, hyponatremia, syncope who presents to this ED via walk in for evaluation of dizziness. Patient reports room spinning weakness, dizziness, nausea, vomiting and shortness of breath that is worse with exertion. States he has been having these episodes for the past 2 to 3 weeks, but they have been more constant and severe over the last couple days. Patient endorses numbness in his saddle region for the past 10 days, which has led to several occasions of incontinence. Patient also endorses worsening back pain, states he has had 5 back surgeries in the past. The patient reports that he quit smoking about 3 years ago. However per NRS note, still smoking. He reports current alcohol use - however per assessment he said he doesn't drink anymore. He reports current drug use. Drug: Marijuana.    Patient reports this is acute on chronic pain, that he's had this pain for 25 yrs however it started worsening in the fall as well as dizziness. Pain located in lower back, described as feeling as though he was kicked in the back by a horse, pain radiates down his right leg, feels today is the worse its been since progressing last fall after shoveling the driveway, states he was very functional prior to last fall, building retaining walls last summer etc. Reports numbness in his right foot and right groin, described as shooting, burning pain, constant, worse  "when walking for a long period of time. States he had shingles last month, appears to have been diagnosed 5/19/2022 -  takes hydroxyzine for itching from the shingles.     Atorvastatin was filled 4/20/2022 40 mg daily x 90 day supply, hadn't filled since 8/8/21 prior to that, also filled hydroxyzine 25 mg #30 (5 day supply), had filled since 1/2021 prior to that. no changes to cymbalta dose since 8/2021 - per patient has been taking these meds regularly and no changes to them.     Pt reports he's tried gabapentin in the past with no benefit. Recommend trialing lyrica 25 mg po BID and titrating to effect every 2-3 days if no nixon's and pain relief present. Opioids not recommended for chronic pain in setting of dizziness/unsteady gait, increased risk for falls/polypharmacy.       PLAN:   1) Pain is consistent with Acute on chronic back pain, worsening over the past few months. This is associated with stable pain radiating to the buttocks. History of multiple previous decompressive laminectomy, MRI with advanced spondylosis and high-grade canal stenosis at L2/L3-L3/L4. Per H&P, Suspect Dizziness/unsteady gaits related to neurogenic claudication with unsteady gait secondary to spinal stenosis, recent admission for same symptoms, unremarkable. Per Neurosurgery, \"As no acute worsening of symptoms, no red flags, I think we can consider conservative management and follow up outpatient. Recommend PT and pain control.\" The patient is opioid naive.   2)Multimodal Medication Therapy  Topical: discontinue lidocaine patch, Alternate topical lidocaine ointment and diclofenac gel QID  NSAID'S: none - hgb 14, crcl  67.7  Muscle Relaxants: new start methocarbamol 500 mg po QID - change to prn - not reporting muscle spasms during assessment  Adjuvants:*recommend scheduled APAP 975 mg po TID, trial lyrica 25 mg po BID, hydroxyzine 25-50 mg po q8h prn   Antidepressants/anxiolytics: cymbalta 120 mg po daily- can cause/worsemn/contribute " to hyponatremia  Opioids: oxycodone 2.5 mg po q6h prn - discontinue  IV Pain medication: discontinue IV dilaudid - discontinue  3)Non-medication interventions  Acupuncture consult- as available Mon and Thursday  Integrative consult - if agreeable  4)Constipation Prophylaxis: prn ordered  5) Follow up   -Discharge Recommendations - We recommend prescribing the following at the time of discharge: TBD, follow up NRS      MN  pulled from system on 06/10/22. This indicated no opioid use or controlled medication usage in the last 12 months.          History of Present Illness (HPI):       Donovan Jane is a 71 year old old male with acute on chronic back pain. Per MN  review, the patient is opioid naive.     Per chart review, Patient was admitted to [facility] on 5/19 -5/20/22 for evaluation of nausea, vomiting, diarrhea and a syncopal episode. Patient was noted to have SHABANA and hyponatremia. Echo did not show any concerning abnormalities or arrhythmias. Patient was clinically improved and discharged home.     Review of medical record/Summary of labs and care everywhere.    Home pain meds include: cymbalta 120 mg po daily, atarax 25-50 mg po q8h prn itching, lidocaine patch x 1            Imaging  MR Brain w/o Contrast    Result Date: 6/9/2022  EXAM: MR BRAIN W/O CONTRAST LOCATION: [facility] DATE/TIME: 6/9/2022 5:43 PM INDICATION: Vertigo. COMPARISON: MRI brain 05/21/2022. TECHNIQUE: Routine multiplanar multisequence head MRI without intravenous contrast. FINDINGS: INTRACRANIAL CONTENTS: No areas of abnormally restricted diffusion to suggest acute or subacute infarct. No areas of abnormal parenchymal susceptibility. Mild prominence of the lateral ventricles and sulci. Small amount of FLAIR hyperintensity seen within the periventricular white matter and central zari, nonspecific but compatible with areas of gliosis or chronic myelin loss. Cerebellar tonsils are normally positioned.  Visualized upper cervical spinal cord, sella, and corpus callosum are unremarkable. Major skull base vascular flow voids are preserved. OSSEOUS STRUCTURES/SOFT TISSUES: Normal marrow signal. ORBITS: No abnormality accounting for technique. SINUSES/MASTOIDS: Mild paranasal sinus mucosal thickening. Moderate fluid or mucosal thickening left mastoid tip compatible with inflammatory change. No finding for an obstructing lesion at the level the left fossa of Rosenmuller.     IMPRESSION: 1.  No finding for intracranial hemorrhage, mass, or acute infarct. 2.  Mild to moderate volume loss and mild presumed sequela chronic microvascular ischemic change. 3.  Moderate fluid or mucosal thickening left mastoid sinus compatible with inflammatory change and similar to prior.    MR Brain w/o Contrast    Result Date: 5/21/2022  EXAM: MR BRAIN W/O CONTRAST LOCATION: [facility] DATE/TIME: 5/21/2022 5:44 PM INDICATION: Syncope, nausea, vomiting, headache, lightheadedness. COMPARISON: None. TECHNIQUE: Routine multiplanar multisequence head MRI without intravenous contrast. FINDINGS: INTRACRANIAL CONTENTS: No acute or subacute infarct. No mass, acute hemorrhage, or extra-axial fluid collections. Scattered nonspecific T2/FLAIR hyperintensities within the cerebral white matter and zari most consistent with mild chronic microvascular ischemic change. Moderate generalized cerebral atrophy. No hydrocephalus. Normal position of the cerebellar tonsils. SELLA: No abnormality accounting for technique. OSSEOUS STRUCTURES/SOFT TISSUES: Normal marrow signal. The major intracranial vascular flow voids are maintained. ORBITS: No abnormality accounting for technique. SINUSES/MASTOIDS: No paranasal sinus mucosal disease. No middle ear or mastoid effusion.     IMPRESSION: 1.  No acute intracranial process. 2.  Generalized brain atrophy and presumed microvascular ischemic changes as detailed above.    Echocardiogram  Complete    Result Date: 2022  [id number]^EDYTA^WHIT^CHARLIE  [id number] [place] [address]  Name: EDYTA MATHEW MRN: [id number] : 1950 Study Date: 2022 10:47 AM Age: 71 yrs Gender: Male Patient Location: [facility] Reason For Study: Syncope and Collapse Ordering Physician: WHIT LAN Referring Physician: WHIT LAN Performed By:   BSA: 2.1 m2 Height: 68 in Weight: 215 lb HR: 80 ______________________________________________________________________________ Procedure Complete Echo Adult. Definity (NDC #74626-483) given intravenously. Technically difficult study. Poor acoustic windows. ______________________________________________________________________________ Interpretation Summary  Technically difficult study. Poor acoustic windows. Left ventricular function is normal.The ejection fraction is 55-60%. Normal right ventricle size and systolic function. Valve structures generally not well visualized. Doppler imaging does not suggest the presence of hemodynamically significant stenosis or regurgitation. There is no comparison study available. ______________________________________________________________________________ Left Ventricle The left ventricle is normal in size. Left ventricular function is normal.The ejection fraction is 55-60%. Left ventricular diastolic function is abnormal. No regional wall motion abnormalities noted.  Right Ventricle Normal right ventricle size and systolic function.  Atria Normal left atrial size. Right atrial size is normal.  Mitral Valve Mitral valve leaflets appear normal. There is no evidence of mitral stenosis or clinically significant mitral regurgitation.  Tricuspid Valve The tricuspid valve is not well visualized, but is grossly normal. Right ventricular systolic pressure could not be approximated due to inadequate tricuspid regurgitation.  Aortic Valve The aortic valve is not well  visualized. No aortic stenosis is present.  Pulmonic Valve The pulmonic valve is not well visualized.  Vessels The aorta root cannot be assessed. The thoracic aorta cannot be assessed.  Pericardium There is no pericardial effusion.  ______________________________________________________________________________ MMode/2D Measurements & Calculations asc Aorta Diam: 3.6 cm  LA Volume Indexed (AL/bp): 31.0 ml/m2  Time Measurements MM HR: 76.0 BPM  Doppler Measurements & Calculations MV E max bret: 58.7 cm/sec MV A max bret: 84.4 cm/sec MV E/A: 0.70 MV dec slope: 240.6 cm/sec2 MV dec time: 0.25 sec Ao V2 max: 99.3 cm/sec Ao max P.0 mmHg Ao V2 mean: 67.8 cm/sec Ao mean P.1 mmHg Ao V2 VTI: 18.4 cm LV V1 max P.5 mmHg LV V1 max: 78.6 cm/sec LV V1 VTI: 17.7 cm AV Bret Ratio (DI): 0.79 E/E' av.9  Lateral E/e': 9.5 Medial E/e': 10.4  ______________________________________________________________________________ Report approved by: Cristin Boyer 2022 12:03 PM       US Abdominal Aorta Imaging    Result Date: 2022  EXAM: US ABDOMINAL AORTA LOCATION: [facility] DATE/TIME: 2022 12:41 AM INDICATION: Syncope. COMPARISON: None. TECHNIQUE: Transverse and longitudinal images of the aorta. Color flow and spectral Doppler with waveform analysis. FINDINGS: No abdominal aortic aneurysm. Mild atherosclerotic changes in the abdominal aorta. MEASUREMENTS: Proximal Aorta: 2.6 x 2.4 cm. Mid Aorta: 2.1 x 2.0 cm. Distal Aorta: 1.9 x 1.7 cm. Right Common Iliac Artery: 1.2 x 1.1 cm. Left Common Iliac Artery: 1.1 x 1.0 cm.     IMPRESSION: Normal caliber abdominal aorta.     MR Lumbar Spine w/o & w Contrast    Result Date: 2022  EXAM: MR LUMBAR SPINE W/O and W CONTRAST LOCATION: [facility] DATE/TIME: 2022 5:42 PM INDICATION: Back pain. Sagittal anesthesia. Incontinence. COMPARISON: None. CONTRAST: 10ml Gadavist TECHNIQUE: Routine Lumbar Spine MRI without and  with IV contrast. FINDINGS: Nomenclature is based on 5 lumbar type vertebral bodies. There is mild convex right curvature centered at the L1-L2 level with slight rightward subluxation of L2 on L3. Slight retrolisthesis L1 on L2. Otherwise normal lumbar lordosis. There is approximately 30% chronic height loss along the anterior-inferior aspect of L1. Slight height loss central superior endplate of L2. Prominent ventral spurring at these levels and extensive Modic type III changes/sclerosis are seen along the apposing endplates. Fluid signal is seen within the central and ventral aspect of the disc space and there is a small amount of enhancement along the apposing endplates. Although fluid signal within the disc may be seen in the setting of infection, given  the associated facet degenerative changes at this level and lack of stranding of the prevertebral soft tissues, findings are favored to be reflect chronic degenerative change rather than infection. There is ankylosis across the ventral aspect of L5-S1. Modic type II changes are seen posteriorly at L4-L5 and L3-L4. Vertebral body heights are otherwise preserved. No pars defects. Postsurgical changes are seen at the L4 and L5 levels and patient is status post left-sided decompressive laminectomy at L4 and left-sided laminectomy and medial facetectomy at L5. Left-sided laminectomy and medial facetectomy L1-L2. There is atrophy of the paramedian dorsal paraspinal musculature with more generalized atrophy and below the L4 level. Psoas muscles are normal in appearance. Visualized aorta is normal in caliber. Visualized osseous pelvis is unremarkable. Distal spinal cord is unremarkable. Conus terminates at the upper L1 level. T12-L1: Moderate interspace narrowing. Loss of normal disc T2 prolongation. Shallow posterior disc bulge. No facet arthropathy. No spinal canal or neural foraminal stenosis. L1-L2: Advanced interspace narrowing. Fluid signal is seen within the  central and ventral aspect of the interspace. Mild posterior interbody spurring with slight retrolisthesis L1 on L2. Prior decompressive laminectomy and left-sided facetectomy. Mild right-sided facet arthropathy. Canal is well decompressed dorsally. Mild right foraminal stenosis. Mild left foraminal stenosis. L2-L3: Mild to moderate interspace narrowing. Loss of normal disc T2 prolongation. Moderate-sized broad-based posterior disc bulge partially effaces the caudal foramina. Mild facet arthropathy and ligamentum flavum thickening. Severe spinal canal stenosis (axial image 27). Moderate right and mild left neural foraminal stenosis. L3-L4: Mild posterior interspace narrowing. Loss of normal disc T2 prolongation. Shallow broad-based posterior disc herniation. Moderate right and mild left-sided facet arthropathy and ligamentum flavum thickening. Severe spinal canal stenosis, greater on the right. Moderate to severe right foraminal stenosis. Moderate left foraminal stenosis. L4-L5: Mild interspace narrowing. Loss of normal disc T2 prolongation. Shallow broad-based posterior disc bulge with small left paracentral annular fissure. Prior left-sided laminectomy. Advanced right and mild left-sided facet arthropathy with ankylosis  across the facet on the right.  Lateral recess narrowing without spinal canal stenosis. No spinal canal stenosis. Severe right foraminal stenosis. Moderate to severe left foraminal stenosis. L5-S1: Advanced interspace narrowing. Partial ankylosis across the interspace. Mild posterior interbody spurring. Prior laminectomy and left-sided medial facetectomy. Moderate right-sided facet arthropathy with probable ankylosis across the facet. Heterogeneous signal along the medial aspect of the residual left-sided facet is favored to reflect granulation tissue along a slightly dilated left S1 nerve root and favored to reflect postoperative change. No spinal canal stenosis. No right foraminal stenosis. No  left foraminal stenosis.     IMPRESSION: 1.  Advanced lumbar spondylosis with high-grade canal stenosis at L2-L3 and L3-L4 and multilevel moderate or high-grade foraminal stenosis as described. 2.  There is mild chronic height loss anterior-inferior aspect of L1. There is fluid signal within the central and ventral aspect of the L1-L2 intervertebral disc space with marginal endplate enhancement at this level. Although fluid signal is seen in the disc space in the setting of infection, the appearance of associated chronic endplate degenerative changes at this level, lack of endplate edema, and lack of prevertebral fat stranding favor degenerative change. 3.  Prior decompressive laminectomies at L1-L2 and L4-L5 and L5-S1. Canal is well decompressed dorsally at these levels.    CTA Head Neck with Contrast    Result Date: 5/19/2022  EXAM: CTA  HEAD NECK WITH CONTRAST LOCATION: [facility] DATE/TIME: 5/19/2022 8:54 PM INDICATION: Headache. Syncopal episode. COMPARISON: Cervical spine CT on the same date CONTRAST: isovue [id number]  75ml TECHNIQUE: Head and neck CT angiogram with IV contrast. Noncontrast head CT followed by axial helical CT images of the head and neck vessels obtained during the arterial phase of intravenous contrast administration. Axial 2D reconstructed images and multiplanar 3D MIP reconstructed images of the head and neck vessels were performed by the technologist. Dose reduction techniques were used. All stenosis measurements made according to NASCET criteria unless otherwise specified. FINDINGS: NONCONTRAST HEAD CT: INTRACRANIAL CONTENTS: No intracranial hemorrhage, extraaxial collection, or mass effect.  No CT evidence of acute infarct. Mild presumed chronic small vessel ischemic changes. Mild generalized volume loss. No hydrocephalus. VISUALIZED ORBITS/SINUSES/MASTOIDS: No intraorbital abnormality. Air-fluid level left maxillary sinus. Scattered fluid/membrane thickening in the  left mastoid air cells. No apparent mass in the posterior nasopharynx or skull base. BONES/SOFT TISSUES: No acute abnormality. HEAD CTA: ANTERIOR CIRCULATION: No stenosis/occlusion, aneurysm, or high flow vascular malformation. Standard [place] anatomy. POSTERIOR CIRCULATION: No stenosis/occlusion, aneurysm, or high flow vascular malformation. Dominant right and smaller left vertebral artery contribute to a normal basilar artery. DURAL VENOUS SINUSES: Expected enhancement of the major dural venous sinuses. NECK CTA: RIGHT CAROTID: No measurable stenosis or dissection. LEFT CAROTID: No measurable stenosis or dissection. VERTEBRAL ARTERIES: No focal stenosis or dissection. Dominant right and smaller left vertebral arteries. AORTIC ARCH: Classic aortic arch anatomy with no significant stenosis at the origin of the great vessels. NONVASCULAR STRUCTURES: Unremarkable.     IMPRESSION: HEAD CT: 1.  No acute intracranial process. 2.  Moderate air-fluid level left maxillary sinus. Correlate for symptoms of acute sinusitis. HEAD CTA: 1.  No stenosis/occlusion, aneurysm, or high flow vascular malformation. NECK CTA: 1.  No measurable stenosis or dissection.    Cervical spine CT w/o contrast    Result Date: 5/19/2022  EXAM: CT CERVICAL SPINE W/O CONTRAST LOCATION: [facility] DATE/TIME: 5/19/2022 8:56 PM INDICATION: Neck pain, headache COMPARISON: None. TECHNIQUE: Routine CT Cervical Spine without IV contrast. Multiplanar reformats. Dose reduction techniques were used. FINDINGS: VERTEBRA: Reversal of the usual cervical lordosis. Vertebral body height is normal. 2 mm anterolisthesis of C2, 2 mm retrolisthesis of C5 and C6 No fracture or posttraumatic subluxation. CANAL/FORAMINA: Mild central canal stenosis at C5-6. There is severe foraminal stenosis on the right at C5-6, bilaterally at C6-7 and on the left at C3-4 and C2-3. PARASPINAL: Partial opacification of the left mastoid air cells.      IMPRESSION: 1.  No fracture or posttraumatic subluxation. 2.  Reversal of the usual cervical lordosis. 3.  Multilevel foraminal stenosis as detailed above..    CT Chest Pulmonary Embolism w Contrast    Result Date: 5/19/2022  EXAM: CT CHEST PULMONARY EMBOLISM W CONTRAST LOCATION: [facility] DATE/TIME: 5/19/2022 8:56 PM INDICATION: Shortness of breath with exertion, syncope COMPARISON: None. TECHNIQUE: CT chest pulmonary angiogram during arterial phase injection of IV contrast. Multiplanar reformats and MIP reconstructions were performed. Dose reduction techniques were used. CONTRAST: isovue [id number]  75ml FINDINGS: ANGIOGRAM CHEST: No evidence for pulmonary embolism. Pulmonary arteries normal in caliber. Thoracic aorta normal in caliber. No aortic dissection or other acute abnormality. HEART: Cardiac chambers within normal limits. No pericardial effusion. Severe coronary artery calcification. MEDIASTINUM: No adenopathy or mass. Small hiatal hernia. LUNGS AND PLEURA: No pulmonary mass, consolidation, or suspicious pulmonary nodule. No pleural effusion or pneumothorax. LIMITED UPPER ABDOMEN: Mild bilateral adrenal gland thickening. There are a few calcified granulomas in the spleen. MUSCULOSKELETAL: Severe degenerative change in the right glenohumeral joint. There are left sixth and seventh and eighth rib fractures laterally, chronic.     IMPRESSION: 1.  No evidence for pulmonary embolism.        Amilcar Camp, PharmD, BCPS, CPE  Acute Care Pain Management Program  [facility] (Sergio CAICEDO, [place])   With questions call [phone]  Preference if for [employer] Paging - [place]  Click HERE to page Cely

## 2022-06-10 NOTE — PROGRESS NOTES
Patient seen by Dr. Hutson this morning.  Chart reviewed.  Med rec is completed.  Appreciate neurosurgery input.  Awaiting pain team.  Currently on empirical Rocephin for UTI.  Cultures pending.  Has history of chronic hyponatremia as per H&P.  Order serum and urine osmolality and urine sodium.  Ordered TSH and cortisol level. Ordered Mag replacement protocol. Noted IV Mag given in the ER  Lab Results   Component Value Date    A1C 6.5 05/20/2022    A1C 6.8 07/24/2018

## 2022-06-10 NOTE — PROGRESS NOTES
[facility]      OUTPATIENT PHYSICAL THERAPY EVALUATION  PLAN OF TREATMENT FOR OUTPATIENT REHABILITATION  (COMPLETE FOR INITIAL CLAIMS ONLY)  Patient's Last Name, First Name, M.I.  YOB: 1950  Donovan Jane                        Provider's Name  [facility] Medical Record No.  [id number]                               Onset Date:  06/09/22   Start of Care Date:  06/10/22      Type:     _X_PT   ___OT   ___SLP Medical Diagnosis:  cystitis                        PT Diagnosis:  impaired functional mobility   Visits from SOC:  1   _________________________________________________________________________________  Plan of Treatment/Functional Goals    Planned Interventions: balance training, gait training, home exercise program, stair training, strengthening, transfer training, stretching     Goals: See Physical Therapy Goals on Care Plan in [employer] electronic health record.    Therapy Frequency: Daily  Predicted Duration of Therapy Intervention: 06/17/22  _________________________________________________________________________________    I CERTIFY THE NEED FOR THESE SERVICES FURNISHED UNDER        THIS PLAN OF TREATMENT AND WHILE UNDER MY CARE     (Physician co-signature of this document indicates review and certification of the therapy plan).              Certification date from: 06/10/22, Certification date to: 06/17/22    Referring Physician: Dr. ESTELA Malagon            Initial Assessment        See Physical Therapy evaluation dated 06/10/22 in [employer] electronic health record.

## 2022-06-10 NOTE — PROGRESS NOTES
06/10/22 1500   Quick Adds   Quick Adds Certification   Type of Visit Initial PT Evaluation   Living Environment   People in Home child(emre), adult  (son)   Current Living Arrangements house  ([facility])   Home Accessibility stairs within home   Number of Stairs, Within Home, Primary greater than 10 stairs   Stair Railings, Within Home, Primary railings safe and in good condition   Transportation Anticipated family or friend will provide;other (see comments)  ([employer])   Living Environment Comments shower and laundry upstairs   Self-Care   Equipment Currently Used at Home walker, rolling   Activity/Exercise/Self-Care Comment does not drive; otherwise independent ADL's/IADL's   General Information   Onset of Illness/Injury or Date of Surgery 06/09/22   Referring Physician Dr. ESTELA Malagon   Patient/Family Therapy Goals Statement (PT) less pain; no dizziness   Pertinent History of Current Problem (include personal factors and/or comorbidities that impact the POC) lightheadedness, back pain, cystitis, lumbar stenosis; PMH of chronic low back pain, ETOH, DM, HTN, obesity, recently at hospital 5/19/22   Existing Precautions/Restrictions fall   Cognition   Affect/Mental Status (Cognition) WFL   Orientation Status (Cognition) oriented x 4   Follows Commands (Cognition) WFL   Range of Motion (ROM)   Range of Motion ROM is WFL   Strength (Manual Muscle Testing)   Strength (Manual Muscle Testing) Deficits observed during functional mobility   Bed Mobility   Bed Mobility no deficits identified   Transfers   Transfers sit-stand transfer   Sit-Stand Transfer   Sit-Stand [place] (Transfers) supervision;verbal cues   Assistive Device (Sit-Stand Transfers) walker, front-wheeled   Gait/Stairs (Locomotion)   [place] Level (Gait) contact guard   Assistive Device (Gait) walker, front-wheeled   Distance in Feet (Required for LE Total Joints) 70   Pattern (Gait) swing-through   Deviations/Abnormal Patterns (Gait) gait speed  decreased;stride length decreased;ivelisse decreased;other (see comments)  (shaking BLE's; pain low back into RLE)   Sensory Examination   Sensory Perception Comments pt reports numbness BLE's   Clinical Impression   Criteria for Skilled Therapeutic Intervention Yes, treatment indicated   PT Diagnosis (PT) impaired functional mobility   Influenced by the following impairments pain; decreased strength, balance, activity tolerance, sensation   Functional limitations due to impairments transfers, gait, stairs   Clinical Presentation (PT Evaluation Complexity) Stable/Uncomplicated   Clinical Presentation Rationale pt presents as medically diagnosed   Clinical Decision Making (Complexity) moderate complexity   Planned Therapy Interventions (PT) balance training;gait training;home exercise program;stair training;strengthening;transfer training;stretching   Anticipated Equipment Needs at Discharge (PT) walker, rolling   Risk & Benefits of therapy have been explained evaluation/treatment results reviewed;patient   PT Discharge Planning   PT Discharge Recommendation (DC Rec) Transitional Care Facility;home with assist;home with home care physical therapy   PT Rationale for DC Rec assist of 1 for mobility with rw due to back pain however pt declines TCU; alone most of time at home   Therapy Certification   Start of care date 06/10/22   Certification date from 06/10/22   Certification date to 06/17/22   Medical Diagnosis cystitis   Total Evaluation Time   Total Evaluation Time (Minutes) 15   Physical Therapy Goals   PT Frequency Daily   PT Predicted Duration/Target Date for Goal Attainment 06/17/22   PT Goals Gait;Transfers;Stairs   PT: Transfers Modified independent;Sit to/from stand;Assistive device   PT: Gait Supervision/stand-by assist;100 feet;Rolling walker   PT: Stairs 10 stairs;Supervision/stand-by assist;Rail on left;Rail on right

## 2022-06-10 NOTE — CONSULTS
"NEUROSURGERY CONSULTATION NOTE    Donovan Jane   [address] [facility] [id number]  71 year old male  Admission Date/Time: 6/9/2022  3:02 PM  Primary Care Provider: Smitha Hameed  [facility] Attending Physician: Thad Malagon MD    Neurosurgery was asked to see this patient by Thad Malagon MD for evaluation of lumbar stenosis.     PROBLEM LIST:  Active Problems:    Hyponatremia    Hypomagnesemia    Lightheadedness    Acute cystitis without hematuria       Neurosurgery Attending: The patient's clinical examination, laboratory data, and plan was discussed with Dr. Baker     CONSULTATION ASSESSMENT AND PLAN:  71 year old male with 20+ year hx of low back pain, worsening over the past few months. This is associated with stable pain radiating to the buttocks. This can be intermittently associated with weakness after prolonged standing or walking. Complained of urinary hesitancy as well but feels this has improved after one dose of abx for UTI. No recent conservative management. MRI lumbar obtained revealed lumbar stenosis most pronounced at L2-3, L3-4 with scoliosis. As no acute worsening of symptoms, no red flags, I think we can consider conservative management and follow up outpatient. Recommend PT and pain control. Donovan should quit smoking as well. We will arrange follow up, please call with questions.      HPI:  71 year old male with pmh including alcohol use disorder, unsteady gait, tobacco abuse, htn, DM, dizziness, worsening back pain. Donovan tells me he has back pain for \"more than 20 years\" and had five surgeries in the past. At one point he describes fusion (screws placed and subsequently removed) as well as decompressions. He was recently due to have another surgery but has \"put it off a few times\". Surgery to be done at [facility], some sort of anterior lumbar surgery. He tells me he has chronic back pain but has been getting worse. Pain has \"Always\" radiated into the buttocks. He describes some anterior " thigh numbness, no calf numbness and numbness into the feet, about half the foot down to the toes. He notes difficulty initiating urine, weak stream but this feels better today. The pain in the back can be so bad that he shakes and has to sit down, he spends a lot of time in his chair. He is interested in getting this taken care of so he can spend more time with grandkids. Pain can sometimes be associated with weakness in the legs. He has not done recent conservative management. He is currently an every day smoker. He also notes imbalance but he is not sure why. Recently admitted for balance and dizziness, discharged home after electrolytes normalized. Head MRI with chronic microvascular ischemic change, atrophy but no acute findings. Strength intact.     Past Surgical History:   Procedure Laterality Date     BACK SURGERY         REVIEW OF SYSTEMS:  12 point review of systems is negative apart from HPI.     MEDICATIONS:  Current Outpatient Medications   Medication Sig Dispense Refill     albuterol (PROAIR HFA/PROVENTIL HFA/VENTOLIN HFA) 108 (90 Base) MCG/ACT inhaler Inhale 2 puffs into the lungs every 4 hours as needed for shortness of breath / dyspnea or wheezing       aspirin 81 MG EC tablet [ASPIRIN 81 MG EC TABLET] Take 81 mg by mouth daily.       atorvastatin (LIPITOR) 40 MG tablet [ATORVASTATIN (LIPITOR) 40 MG TABLET] Take 40 mg by mouth daily.       DULoxetine (CYMBALTA) 60 MG capsule Take 120 mg by mouth daily       hydrOXYzine (ATARAX) 25 MG tablet Take 25-50 mg by mouth every 8 hours as needed for itching       insulin glargine (LANTUS; BASAGLAR) 100 unit/mL (3 mL) pen [INSULIN GLARGINE (LANTUS; BASAGLAR) 100 UNIT/ML (3 ML) PEN] Inject 15 Units under the skin every morning.       Lidocaine (LIDOCARE) 4 % Patch Place 1 patch onto the skin every 24 hours To prevent lidocaine toxicity, patient should be patch free for 12 hrs daily. 15 patch 0     lisinopril (ZESTRIL) 2.5 MG tablet Take 4 tablets (10 mg) by  "mouth daily (Patient taking differently: Take 5 mg by mouth daily) 30 tablet 1         ALLERGIES/SENSITIVITIES:     No Known Allergies    PERTINENT SOCIAL HISTORY: reviewed  Social History     Socioeconomic History     Marital status:      Spouse name: None     Number of children: None     Years of education: None     Highest education level: None   Tobacco Use     Smoking status: Former Smoker     Packs/day: 1.50     Years: 15.00     Pack years: 22.50     Quit date: 7/23/2018     Years since quitting: 3.8     Smokeless tobacco: Never Used   Substance and Sexual Activity     Alcohol use: Yes     Drug use: Yes     Types: Marijuana     Sexual activity: Yes     Partners: Female     Birth control/protection: None         FAMILY HISTORY:  History reviewed. No pertinent family history.     PHYSICAL EXAM:   Constitutional: /85 (BP Location: Left arm, Patient Position: Standing, Cuff Size: Adult Regular)   Pulse 111   Temp 97.7  F (36.5  C) (Oral)   Resp 17   Ht 1.74 m (5' 8.5\")   Wt 90.7 kg (200 lb)   SpO2 97%   BMI 29.97 kg/m       Mental Status: A & O in no acute distress.  Affect is appropriate.  Speech is fluent.  Recent and remote memory are intact.  Attention span and concentration are normal.     Cranial Nerves: CN1: grossly intact per patient recall. CN2: No funduscopic exam performed. CN3,4 & 6: Pupillary light response, lateral and vertical gaze normal.  No nystagmus.  Visual fields are full to confrontation. CN5: Intact to touch CN7: No facial weakness, smile, facial symmetry intact. CN8: Intact to spoken voice. CN9&10: Gag reflex, uvula midline, palate rises with phonation. CN11: Shoulder shrug 5/5 intact bilaterally. CN12: Tongue midline and moves freely from side to side.     Motor: No pronator drift of upper extremity. Normal bulk and tone all muscle groups of upper and lower extremities.    Strength: 5/5 in upper and lower extremities, right foot flat but DF intact.     Sensory: " Sensation intact bilaterally to light touch. Subjective numbness to the feet and bilateral anterior thighs.      Coordination; gait testing deferred due to dizziness.      Reflexes; supinator, biceps, triceps, knee/ ankle jerk intact. No hoffmans/babinski/ clonus.    IMAGING:  I personally reviewed all radiographic images  MRI lumbar reviewed.          (non critical care) I spent more than 90 minutes in this apt, examining the pt, reviewing the scans, reviewing notes from chart, discussing treatment options with risks and benefits and coordinating care. >50 % clinic time was spent in face to face counseling and coordinating care    Gloria Woodruff NP      Cc:   No referring provider defined for this encounter.    Smitha Hameed  [unfilled]

## 2022-06-10 NOTE — PROGRESS NOTES
Patient has been changed to observation, call received from . Reviewed change with patient and provided patient with the Notification of Status Change letter and [facility] brochure. Writer explained purpose of change and discussed general coverage under Medicare guidelines. Patient has a Blue Cross supplement. Patient denied any further questions or concerns at this time.    Isabel Phillips RN

## 2022-06-11 ENCOUNTER — APPOINTMENT (OUTPATIENT)
Dept: PHYSICAL THERAPY | Facility: HOSPITAL | Age: 72
DRG: 552 | End: 2022-06-11
Payer: MEDICARE

## 2022-06-11 ENCOUNTER — APPOINTMENT (OUTPATIENT)
Dept: OCCUPATIONAL THERAPY | Facility: HOSPITAL | Age: 72
DRG: 552 | End: 2022-06-11
Attending: STUDENT IN AN ORGANIZED HEALTH CARE EDUCATION/TRAINING PROGRAM
Payer: MEDICARE

## 2022-06-11 LAB
ANION GAP SERPL CALCULATED.3IONS-SCNC: 8 MMOL/L (ref 5–18)
BASOPHILS # BLD AUTO: 0 10E3/UL (ref 0–0.2)
BASOPHILS NFR BLD AUTO: 1 %
BUN SERPL-MCNC: 12 MG/DL (ref 8–28)
CALCIUM SERPL-MCNC: 9.5 MG/DL (ref 8.5–10.5)
CHLORIDE BLD-SCNC: 98 MMOL/L (ref 98–107)
CO2 SERPL-SCNC: 23 MMOL/L (ref 22–31)
CREAT SERPL-MCNC: 0.91 MG/DL (ref 0.7–1.3)
EOSINOPHIL # BLD AUTO: 0.2 10E3/UL (ref 0–0.7)
EOSINOPHIL NFR BLD AUTO: 7 %
ERYTHROCYTE [DISTWIDTH] IN BLOOD BY AUTOMATED COUNT: 13.5 % (ref 10–15)
GFR SERPL CREATININE-BSD FRML MDRD: >90 ML/MIN/1.73M2
GLUCOSE BLD-MCNC: 120 MG/DL (ref 70–125)
GLUCOSE BLDC GLUCOMTR-MCNC: 101 MG/DL (ref 70–99)
GLUCOSE BLDC GLUCOMTR-MCNC: 114 MG/DL (ref 70–99)
GLUCOSE BLDC GLUCOMTR-MCNC: 136 MG/DL (ref 70–99)
GLUCOSE BLDC GLUCOMTR-MCNC: 97 MG/DL (ref 70–99)
HCT VFR BLD AUTO: 38.3 % (ref 40–53)
HGB BLD-MCNC: 13 G/DL (ref 13.3–17.7)
IMM GRANULOCYTES # BLD: 0 10E3/UL
IMM GRANULOCYTES NFR BLD: 0 %
LYMPHOCYTES # BLD AUTO: 1.1 10E3/UL (ref 0.8–5.3)
LYMPHOCYTES NFR BLD AUTO: 35 %
MAGNESIUM SERPL-MCNC: 1.8 MG/DL (ref 1.8–2.6)
MCH RBC QN AUTO: 32.3 PG (ref 26.5–33)
MCHC RBC AUTO-ENTMCNC: 33.9 G/DL (ref 31.5–36.5)
MCV RBC AUTO: 95 FL (ref 78–100)
MONOCYTES # BLD AUTO: 0.5 10E3/UL (ref 0–1.3)
MONOCYTES NFR BLD AUTO: 17 %
NEUTROPHILS # BLD AUTO: 1.3 10E3/UL (ref 1.6–8.3)
NEUTROPHILS NFR BLD AUTO: 40 %
NRBC # BLD AUTO: 0 10E3/UL
NRBC BLD AUTO-RTO: 0 /100
PLATELET # BLD AUTO: 205 10E3/UL (ref 150–450)
POTASSIUM BLD-SCNC: 4 MMOL/L (ref 3.5–5)
RBC # BLD AUTO: 4.03 10E6/UL (ref 4.4–5.9)
SODIUM SERPL-SCNC: 129 MMOL/L (ref 136–145)
WBC # BLD AUTO: 3.2 10E3/UL (ref 4–11)

## 2022-06-11 PROCEDURE — G0378 HOSPITAL OBSERVATION PER HR: HCPCS

## 2022-06-11 PROCEDURE — 82962 GLUCOSE BLOOD TEST: CPT

## 2022-06-11 PROCEDURE — 120N000001 HC R&B MED SURG/OB

## 2022-06-11 PROCEDURE — 99223 1ST HOSP IP/OBS HIGH 75: CPT | Performed by: CLINICAL NURSE SPECIALIST

## 2022-06-11 PROCEDURE — 36415 COLL VENOUS BLD VENIPUNCTURE: CPT | Performed by: STUDENT IN AN ORGANIZED HEALTH CARE EDUCATION/TRAINING PROGRAM

## 2022-06-11 PROCEDURE — 250N000013 HC RX MED GY IP 250 OP 250 PS 637: Performed by: CLINICAL NURSE SPECIALIST

## 2022-06-11 PROCEDURE — 97166 OT EVAL MOD COMPLEX 45 MIN: CPT | Mod: GO

## 2022-06-11 PROCEDURE — 250N000013 HC RX MED GY IP 250 OP 250 PS 637: Performed by: INTERNAL MEDICINE

## 2022-06-11 PROCEDURE — 85025 COMPLETE CBC W/AUTO DIFF WBC: CPT | Performed by: STUDENT IN AN ORGANIZED HEALTH CARE EDUCATION/TRAINING PROGRAM

## 2022-06-11 PROCEDURE — 250N000011 HC RX IP 250 OP 636: Performed by: STUDENT IN AN ORGANIZED HEALTH CARE EDUCATION/TRAINING PROGRAM

## 2022-06-11 PROCEDURE — 80048 BASIC METABOLIC PNL TOTAL CA: CPT | Performed by: STUDENT IN AN ORGANIZED HEALTH CARE EDUCATION/TRAINING PROGRAM

## 2022-06-11 PROCEDURE — 99232 SBSQ HOSP IP/OBS MODERATE 35: CPT | Performed by: INTERNAL MEDICINE

## 2022-06-11 PROCEDURE — 97116 GAIT TRAINING THERAPY: CPT | Mod: GP

## 2022-06-11 PROCEDURE — 97535 SELF CARE MNGMENT TRAINING: CPT | Mod: GO

## 2022-06-11 PROCEDURE — 83735 ASSAY OF MAGNESIUM: CPT | Performed by: INTERNAL MEDICINE

## 2022-06-11 PROCEDURE — 250N000013 HC RX MED GY IP 250 OP 250 PS 637: Performed by: STUDENT IN AN ORGANIZED HEALTH CARE EDUCATION/TRAINING PROGRAM

## 2022-06-11 RX ORDER — NALOXONE HYDROCHLORIDE 0.4 MG/ML
0.2 INJECTION, SOLUTION INTRAMUSCULAR; INTRAVENOUS; SUBCUTANEOUS
Status: DISCONTINUED | OUTPATIENT
Start: 2022-06-11 | End: 2022-06-12 | Stop reason: HOSPADM

## 2022-06-11 RX ORDER — NALOXONE HYDROCHLORIDE 0.4 MG/ML
0.4 INJECTION, SOLUTION INTRAMUSCULAR; INTRAVENOUS; SUBCUTANEOUS
Status: DISCONTINUED | OUTPATIENT
Start: 2022-06-11 | End: 2022-06-12 | Stop reason: HOSPADM

## 2022-06-11 RX ORDER — OXYCODONE HYDROCHLORIDE 5 MG/1
5-10 TABLET ORAL EVERY 4 HOURS PRN
Status: DISCONTINUED | OUTPATIENT
Start: 2022-06-11 | End: 2022-06-12 | Stop reason: HOSPADM

## 2022-06-11 RX ADMIN — CEFTRIAXONE SODIUM 1 G: 1 INJECTION, POWDER, FOR SOLUTION INTRAMUSCULAR; INTRAVENOUS at 21:10

## 2022-06-11 RX ADMIN — DICLOFENAC SODIUM 2 G: 10 GEL TOPICAL at 15:50

## 2022-06-11 RX ADMIN — ACETAMINOPHEN 975 MG: 325 TABLET ORAL at 21:08

## 2022-06-11 RX ADMIN — METHOCARBAMOL 500 MG: 500 TABLET ORAL at 08:10

## 2022-06-11 RX ADMIN — ATORVASTATIN CALCIUM 40 MG: 40 TABLET, FILM COATED ORAL at 08:10

## 2022-06-11 RX ADMIN — INSULIN GLARGINE 10 UNITS: 100 INJECTION, SOLUTION SUBCUTANEOUS at 08:17

## 2022-06-11 RX ADMIN — ACETAMINOPHEN 975 MG: 325 TABLET ORAL at 14:43

## 2022-06-11 RX ADMIN — Medication 400 MG: at 14:43

## 2022-06-11 RX ADMIN — ASPIRIN 81 MG: 81 TABLET, COATED ORAL at 08:10

## 2022-06-11 RX ADMIN — LIDOCAINE: 50 OINTMENT TOPICAL at 10:12

## 2022-06-11 RX ADMIN — HYDROXYZINE HYDROCHLORIDE 50 MG: 25 TABLET, FILM COATED ORAL at 02:07

## 2022-06-11 RX ADMIN — DICLOFENAC SODIUM 2 G: 10 GEL TOPICAL at 12:06

## 2022-06-11 RX ADMIN — ACETAMINOPHEN 975 MG: 325 TABLET ORAL at 08:11

## 2022-06-11 RX ADMIN — DICLOFENAC SODIUM 2 G: 10 GEL TOPICAL at 08:07

## 2022-06-11 RX ADMIN — PREGABALIN 25 MG: 25 CAPSULE ORAL at 21:08

## 2022-06-11 RX ADMIN — METHOCARBAMOL 500 MG: 500 TABLET ORAL at 21:08

## 2022-06-11 RX ADMIN — Medication 1 MG: at 23:14

## 2022-06-11 RX ADMIN — NICOTINE 1 PATCH: 14 PATCH, EXTENDED RELEASE TRANSDERMAL at 08:14

## 2022-06-11 RX ADMIN — LIDOCAINE: 50 OINTMENT TOPICAL at 17:43

## 2022-06-11 RX ADMIN — LIDOCAINE: 50 OINTMENT TOPICAL at 14:44

## 2022-06-11 RX ADMIN — OXYCODONE HYDROCHLORIDE 5 MG: 5 TABLET ORAL at 18:32

## 2022-06-11 RX ADMIN — LISINOPRIL 5 MG: 5 TABLET ORAL at 08:09

## 2022-06-11 RX ADMIN — OXYCODONE HYDROCHLORIDE 5 MG: 5 TABLET ORAL at 14:44

## 2022-06-11 RX ADMIN — Medication 400 MG: at 08:10

## 2022-06-11 RX ADMIN — Medication 400 MG: at 19:46

## 2022-06-11 RX ADMIN — PREGABALIN 25 MG: 25 CAPSULE ORAL at 08:09

## 2022-06-11 RX ADMIN — METHOCARBAMOL 500 MG: 500 TABLET ORAL at 15:50

## 2022-06-11 RX ADMIN — LIDOCAINE: 50 OINTMENT TOPICAL at 21:08

## 2022-06-11 RX ADMIN — DULOXETINE 120 MG: 60 CAPSULE, DELAYED RELEASE ORAL at 08:10

## 2022-06-11 RX ADMIN — DICLOFENAC SODIUM 2 G: 10 GEL TOPICAL at 18:34

## 2022-06-11 RX ADMIN — HYDROXYZINE HYDROCHLORIDE 50 MG: 25 TABLET, FILM COATED ORAL at 23:14

## 2022-06-11 RX ADMIN — OXYCODONE HYDROCHLORIDE 5 MG: 5 TABLET ORAL at 10:19

## 2022-06-11 ASSESSMENT — ACTIVITIES OF DAILY LIVING (ADL)
PREVIOUS_RESPONSIBILITIES: MEAL PREP;HOUSEKEEPING;LAUNDRY;SHOPPING;MEDICATION MANAGEMENT;FINANCES
ADLS_ACUITY_SCORE: 37
ADLS_ACUITY_SCORE: 37
ADLS_ACUITY_SCORE: 43
ADLS_ACUITY_SCORE: 37

## 2022-06-11 NOTE — PLAN OF CARE
PRIMARY DIAGNOSIS: ACUTE PAIN  OUTPATIENT/OBSERVATION GOALS TO BE MET BEFORE DISCHARGE:  1. Pain Status: Improved-controlled with oral pain medications.    2. Return to near baseline physical activity: No    3. Cleared for discharge by consultants (if involved): No    Discharge Planner Nurse   Safe discharge environment identified: No  Barriers to discharge: Yes       Entered by: Danae Dudley RN 06/11/2022 4:36 AM     Please review provider order for any additional goals.   Nurse to notify provider when observation goals have been met and patient is ready for discharge.Goal Outcome Evaluation:

## 2022-06-11 NOTE — PROGRESS NOTES
Occupational Therapy       06/11/22 0929   Quick Adds   Quick Adds Certification   Type of Visit Initial Occupational Therapy Evaluation   Living Environment   Living Environment Comments see PT comments   Self-Care   Usual Activity Tolerance good   Current Activity Tolerance fair   Equipment Currently Used at Home walker, rolling;grab bar, tub/shower   Fall history within last six months no   Instrumental Activities of Daily Living (IADL)   Previous Responsibilities meal prep;housekeeping;laundry;shopping;medication management;finances  (family assist w/other IADLs)   General Information   Onset of Illness/Injury or Date of Surgery 06/09/22   Referring Physician Dr Malagon   Patient/Family Therapy Goal Statement (OT) go home   Existing Precautions/Restrictions no known precautions/restrictions   Limitations/Impairments other (see comments)  (none)   Left Upper Extremity (Weight-bearing Status) full weight-bearing (FWB)   Right Upper Extremity (Weight-bearing Status) full weight-bearing (FWB)   Left Lower Extremity (Weight-bearing Status) full weight-bearing (FWB)   Right Lower Extremity (Weight-bearing Status) full weight-bearing (FWB)   Cognitive Status Examination   Orientation Status orientation to person, place and time   Behavioral Issues other (see comments)   Affect/Mental Status (Cognitive) WNL   Follows Commands WNL   Visual Perception   Visual Impairment/Limitations corrective lenses full-time   Sensory   Sensory Quick Adds No deficits were identified   Pain Assessment   Patient Currently in Pain No   Integumentary/Edema   Integumentary/Edema no deficits were identifed   Posture   Posture forward head position   Range of Motion Comprehensive   General Range of Motion no range of motion deficits identified   Strength Comprehensive (MMT)   General Manual Muscle Testing (MMT) Assessment no strength deficits identified   Muscle Tone Assessment   Muscle Tone Quick Adds No deficits were identified    Coordination   Upper Extremity Coordination No deficits were identified   Bed Mobility   Comment (Bed Mobility) CGA   Transfers   Transfer Comments CGA w/RW   Transfer Skill: Bed to Chair/Chair to Bed   Bed-Chair [place] (Transfers) contact guard   Sit-Stand Transfer   Sit-Stand [place] (Transfers) contact guard   Toilet Transfer   Toilet Transfer Comments CGA w/RW   Balance   Balance Assessment standing balance: dynamic   Activities of Daily Living   BADL Assessment/Intervention lower body dressing   Lower Body Dressing Assessment/Training   [place] Level (Lower Body Dressing) contact guard assist   Clinical Impression   Criteria for Skilled Therapeutic Interventions Met (OT) Yes, treatment indicated   OT Diagnosis decreased ADL skills   OT Problem List-Impairments impacting ADL balance;strength   Assessment of Occupational Performance 3-5 Performance Deficits   Planned Therapy Interventions (OT) ADL retraining   Clinical Decision Making Complexity (OT) moderate complexity   Anticipated Equipment Needs Upon Discharge (OT) tub bench;raised toilet seat   Risk & Benefits of therapy have been explained care plan/treatment goals reviewed   OT Discharge Planning   OT Discharge Recommendation (DC Rec) home with assist   Therapy Certification   Start of Care Date 06/11/22   Certification date from 06/11/22   Certification date to 06/17/22   Medical Diagnosis hyponatremia   Total Evaluation Time (Minutes)   Total Evaluation Time (Minutes) 15

## 2022-06-11 NOTE — PLAN OF CARE
PRIMARY DIAGNOSIS: GENERALIZED WEAKNESS    OUTPATIENT/OBSERVATION GOALS TO BE MET BEFORE DISCHARGE  1. Orthostatic performed: No    2. Tolerating PO medications: Yes    3. Return to near baseline physical activity: No    4. Cleared for discharge by consultants (if involved): No    Discharge Planner Nurse   Safe discharge environment identified: No  Barriers to discharge: Yes       Entered by: Danae Dudley RN 06/11/2022 12:20 AM     Please review provider order for any additional goals.   Nurse to notify provider when observation goals have been met and patient is ready for discharge.Goal Outcome Evaluation:     Pt was calm and pleasant, had some pain in the back, asked for sleeping pill and wanted to sleep but was watching TV when nurses left the room, went to sleep later.

## 2022-06-11 NOTE — PROGRESS NOTES
PRIMARY DIAGNOSIS: ACUTE PAIN  OUTPATIENT/OBSERVATION GOALS TO BE MET BEFORE DISCHARGE:  1. Pain Status: Improved-controlled with oral pain medications.    2. Return to near baseline physical activity: No    3. Cleared for discharge by consultants (if involved): N/A    Discharge Planner Nurse   Safe discharge environment identified: No  Barriers to discharge: Yes       Entered by: Comfort Paul RN 06/10/2022 8:03 PM  Patient reports slight improvement in back pain after receiving PRN Robaxin.  Involved in PT - states he walked down hallway during session this afternoon.  Dizziness improved.  Dysuria improved.  Please review provider order for any additional goals.   Nurse to notify provider when observation goals have been met and patient is ready for discharge.

## 2022-06-11 NOTE — UTILIZATION REVIEW
Admission Status; Secondary Review Determination   Under the authority of the Utilization Management Committee, the utilization review process indicated a secondary review on Donovan Jane. The review outcome is based on review of the medical records, discussions with staff, and applying clinical experience noted on the date of the review.   (x) Inpatient Status Appropriate - This patient's medical care is consistent with medical management for inpatient care and reasonable inpatient medical practice.     RATIONALE FOR DETERMINATION   71 yr old male with chronic back pain, alcohol use disorder, DM1, HTN obesity and some chronic hyponatremia and syncope presented with back pain and dizziness.  Initially back pain without surgical needs and UTI on IV ceftriaxone.  Was orthostatic but chronic issue however today BP lower and thus stopping some BP meds and trending.  Also chronic hyponatremia with baseline 132 however 128-129 and implementing fluid restriction. Will continue to monitor as might need Lasix.    Pain team has now added oxycodone, methocarbamol, hydroxyzine, lyrica and acetaminophen.  Back pain worse today.  Cannot use steroids with acute infection.  Discussed with Dr. Malagon.    At the time of admission with the information available to the attending physician more than 2 nights Hospital complex care was anticipated, based on patient risk of adverse outcome if treated as outpatient and complex care required. Inpatient admission is appropriate based on the Medicare guidelines.   The information on this document is developed by the utilization review team in order for the business office to ensure compliance. This only denotes the appropriateness of proper admission status and does not reflect the quality of care rendered.   The definitions of Inpatient Status and Observation Status used in making the determination above are those provided in the CMS Coverage Manual, Chapter 1 and Chapter 6, section 70.4.    Sincerely,   Andreina Giraldo MD  Utilization Review  Physician Advisor  [facility]

## 2022-06-11 NOTE — PROGRESS NOTES
Progress Note    Assessment/Plan  71-year-old male past medical history of chronic back pain with multiple previous back surgeries, alcohol use disorder, type II DM, hypertension, obesity presents with worsening back pain and dizziness     Acute on chronic back pain, with neurogenic claudication  -history of multiple previous decompressive laminectomy, MRI with advanced spondylosis and high-grade canal stenosis at L2/L3-L3/L4.   --Neurosurgery consulted and no urgent surgical intervention  planned   -Cannot use steroids due to acute bacterial cystitis  -- Order heat to the affected area  -- Pain team consulted and started the patient on oxycodone methocarbamol hydroxyzine Lyrica and acetaminophen  -Lidocaine patch did not work and therefore it was discontinued  -- Continue PT OT evaluation    dizziness/unsteady gaits  -Recently admitted for same, work-ups were unremarkable.  Suspect related to neurogenic claudication with unsteady gait secondary to spinal stenosis.   -- BP is borderline low and therefore will DC lisinopril  --Vitamin B12 is normal at 430     Urinary tract infection.   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  May try Lasix and salt restriction if sodium does not improve.  Patient educated on the same  --Patient claims that he drinks 2-4 beers every day.  Recommended cutting down on beer to 1 day  -Recheck sodium level daily while in the hospital     Elevated lactic acid  -Mildly elevated on admission but improved 0.8 with IV  "hydration     Current smoker  --Patient counseled to quit  --Order nicotine patch on 6/10    Insulin-dependent diabetes  -- Controlled  Lab Results   Component Value Date    A1C 6.5 05/20/2022    A1C 6.8 07/24/2018   -- Currently on 10 units of Lantus, and sliding scale insulin      Diet: Combination Diet Regular Diet Adult, 1500 fluid restriction  DVT Prophylaxis: Ambulate every shift  Hernández Catheter: Not present  Central Lines: None  Cardiac Monitoring: None  Code Status: Full Code      Barriers to discharge: Pain control of the lower back    Anticipated discharge date: 6/12        Subjective  Lower back pain is worse today.  Pain team is adjusting medication.  Cannot give steroids due to acute cystitis.  Patient complained of ongoing bowel incontinence.  Neurosurgery evaluated and does not recommend emergent surgery.  Continues to have off-and-on dizziness.  Blood pressure is borderline low and therefore will DC lisinopril.  Patient denies any fever nausea vomiting diarrhea.    Objective    BP 98/63 (BP Location: Left arm, Patient Position: Standing)   Pulse 109   Temp 97.7  F (36.5  C) (Oral)   Resp 20   Ht 1.727 m (5' 8\")   Wt 89.2 kg (196 lb 9.6 oz)   SpO2 94%   BMI 29.89 kg/m    Weight:   Wt Readings from Last 5 Encounters:   06/10/22 89.2 kg (196 lb 9.6 oz)   05/19/22 97.5 kg (215 lb)       I/O last 3 completed shifts:  In: 1073 [P.O.:1070; I.V.:3]  Out: 3325 [Urine:3325]  I/O this shift:  In: 1080 [P.O.:1080]  Out: 550 [Urine:550]          Physical Exam  Alert, oriented*3  No pallor, icterus, clubbing, cyanosis  Body mass index is 29.89 kg/m .  Paraspinal tenderness in the lower lumbar area  No sinus tenderness  Moist membranes  Neck supple  CVS: S1 S2-N, no murmurs, gallops, rubs  Resp: Inspiratory coarse crackles which improved with coughing.  Abd: soft, No t/g/r  Neuro: no involuntary movements such as tremors  Vasc: no leg edema     Pertinent Labs  ----------------------  Recent Labs   Lab " 06/11/22  1155 06/11/22  0742 06/11/22  0548 06/10/22  1305 06/10/22  1207 06/10/22  0845 06/09/22  1503   NA  --   --  129*  --   --   --  128*   POTASSIUM  --   --  4.0  --   --   --  4.4   CO2  --   --  23  --   --   --  22   BUN  --   --  12  --   --   --  10   CR  --   --  0.91  --   --   --  1.12   MAG  --   --  1.8  --  1.8  --  1.4*   GLC 97 114* 120   < >  --    < > 152*   ALBUMIN  --   --   --   --   --   --  3.9   BILITOTAL  --   --   --   --   --   --  0.5   ALKPHOS  --   --   --   --   --   --  78   ALT  --   --   --   --   --   --  15   AST  --   --   --   --   --   --  18    < > = values in this interval not displayed.     Recent Labs   Lab 06/11/22  0548 06/09/22  1503   WBC 3.2* 3.8*   HGB 13.0* 14.7   HCT 38.3* 41.9    262     Recent Labs   Lab 06/09/22  1503   INR 1.00     Glucose Values Latest Ref Rng & Units 5/19/2022 5/20/2022 5/21/2022 6/9/2022 6/11/2022   Bedside Glucose (mg/dl )  - -- -- -- -- --   GLUCOSE 70 - 125 mg/dL 109 119 74 152(H) 120   Some recent data might be hidden         Pertinent Radiology   Radiology Results: Personally reviewed impression/s  MR Brain w/o Contrast    Result Date: 6/9/2022  EXAM: MR BRAIN W/O CONTRAST LOCATION: [facility] DATE/TIME: 6/9/2022 5:43 PM INDICATION: Vertigo. COMPARISON: MRI brain 05/21/2022. TECHNIQUE: Routine multiplanar multisequence head MRI without intravenous contrast. FINDINGS: INTRACRANIAL CONTENTS: No areas of abnormally restricted diffusion to suggest acute or subacute infarct. No areas of abnormal parenchymal susceptibility. Mild prominence of the lateral ventricles and sulci. Small amount of FLAIR hyperintensity seen within the periventricular white matter and central zari, nonspecific but compatible with areas of gliosis or chronic myelin loss. Cerebellar tonsils are normally positioned. Visualized upper cervical spinal cord, sella, and corpus callosum are unremarkable. Major skull base vascular  flow voids are preserved. OSSEOUS STRUCTURES/SOFT TISSUES: Normal marrow signal. ORBITS: No abnormality accounting for technique. SINUSES/MASTOIDS: Mild paranasal sinus mucosal thickening. Moderate fluid or mucosal thickening left mastoid tip compatible with inflammatory change. No finding for an obstructing lesion at the level the left fossa of Rosenmuller.     IMPRESSION: 1.  No finding for intracranial hemorrhage, mass, or acute infarct. 2.  Mild to moderate volume loss and mild presumed sequela chronic microvascular ischemic change. 3.  Moderate fluid or mucosal thickening left mastoid sinus compatible with inflammatory change and similar to prior.    MR Lumbar Spine w/o & w Contrast    Result Date: 6/9/2022  EXAM: MR LUMBAR SPINE W/O and W CONTRAST LOCATION: [facility] DATE/TIME: 6/9/2022 5:42 PM INDICATION: Back pain. Sagittal anesthesia. Incontinence. COMPARISON: None. CONTRAST: 10ml Gadavist TECHNIQUE: Routine Lumbar Spine MRI without and with IV contrast. FINDINGS: Nomenclature is based on 5 lumbar type vertebral bodies. There is mild convex right curvature centered at the L1-L2 level with slight rightward subluxation of L2 on L3. Slight retrolisthesis L1 on L2. Otherwise normal lumbar lordosis. There is approximately 30% chronic height loss along the anterior-inferior aspect of L1. Slight height loss central superior endplate of L2. Prominent ventral spurring at these levels and extensive Modic type III changes/sclerosis are seen along the apposing endplates. Fluid signal is seen within the central and ventral aspect of the disc space and there is a small amount of enhancement along the apposing endplates. Although fluid signal within the disc may be seen in the setting of infection, given  the associated facet degenerative changes at this level and lack of stranding of the prevertebral soft tissues, findings are favored to be reflect chronic degenerative change rather than  infection. There is ankylosis across the ventral aspect of L5-S1. Modic type II changes are seen posteriorly at L4-L5 and L3-L4. Vertebral body heights are otherwise preserved. No pars defects. Postsurgical changes are seen at the L4 and L5 levels and patient is status post left-sided decompressive laminectomy at L4 and left-sided laminectomy and medial facetectomy at L5. Left-sided laminectomy and medial facetectomy L1-L2. There is atrophy of the paramedian dorsal paraspinal musculature with more generalized atrophy and below the L4 level. Psoas muscles are normal in appearance. Visualized aorta is normal in caliber. Visualized osseous pelvis is unremarkable. Distal spinal cord is unremarkable. Conus terminates at the upper L1 level. T12-L1: Moderate interspace narrowing. Loss of normal disc T2 prolongation. Shallow posterior disc bulge. No facet arthropathy. No spinal canal or neural foraminal stenosis. L1-L2: Advanced interspace narrowing. Fluid signal is seen within the central and ventral aspect of the interspace. Mild posterior interbody spurring with slight retrolisthesis L1 on L2. Prior decompressive laminectomy and left-sided facetectomy. Mild right-sided facet arthropathy. Canal is well decompressed dorsally. Mild right foraminal stenosis. Mild left foraminal stenosis. L2-L3: Mild to moderate interspace narrowing. Loss of normal disc T2 prolongation. Moderate-sized broad-based posterior disc bulge partially effaces the caudal foramina. Mild facet arthropathy and ligamentum flavum thickening. Severe spinal canal stenosis (axial image 27). Moderate right and mild left neural foraminal stenosis. L3-L4: Mild posterior interspace narrowing. Loss of normal disc T2 prolongation. Shallow broad-based posterior disc herniation. Moderate right and mild left-sided facet arthropathy and ligamentum flavum thickening. Severe spinal canal stenosis, greater on the right. Moderate to severe right foraminal stenosis.  Moderate left foraminal stenosis. L4-L5: Mild interspace narrowing. Loss of normal disc T2 prolongation. Shallow broad-based posterior disc bulge with small left paracentral annular fissure. Prior left-sided laminectomy. Advanced right and mild left-sided facet arthropathy with ankylosis  across the facet on the right.  Lateral recess narrowing without spinal canal stenosis. No spinal canal stenosis. Severe right foraminal stenosis. Moderate to severe left foraminal stenosis. L5-S1: Advanced interspace narrowing. Partial ankylosis across the interspace. Mild posterior interbody spurring. Prior laminectomy and left-sided medial facetectomy. Moderate right-sided facet arthropathy with probable ankylosis across the facet. Heterogeneous signal along the medial aspect of the residual left-sided facet is favored to reflect granulation tissue along a slightly dilated left S1 nerve root and favored to reflect postoperative change. No spinal canal stenosis. No right foraminal stenosis. No left foraminal stenosis.     IMPRESSION: 1.  Advanced lumbar spondylosis with high-grade canal stenosis at L2-L3 and L3-L4 and multilevel moderate or high-grade foraminal stenosis as described. 2.  There is mild chronic height loss anterior-inferior aspect of L1. There is fluid signal within the central and ventral aspect of the L1-L2 intervertebral disc space with marginal endplate enhancement at this level. Although fluid signal is seen in the disc space in the setting of infection, the appearance of associated chronic endplate degenerative changes at this level, lack of endplate edema, and lack of prevertebral fat stranding favor degenerative change. 3.  Prior decompressive laminectomies at L1-L2 and L4-L5 and L5-S1. Canal is well decompressed dorsally at these levels.    EKG Results: not reviewed.

## 2022-06-11 NOTE — CONSULTS
"[facility] ACUTE PAIN SERVICE    ([facility], [facility], [facility])   Consult Note    Date of Admission:  6/9/2022  Date of Consult: 06/11/22    Physician requesting consult: Jorge Hutson MD   Reason for consult: chronic back pain     Assessment/Plan:     Donovan Jane is a 71 year old male who was admitted on 6/9/2022.   Pain Service is asked to see the patient for worsening back pain over the past 2 months. Admitted for evaluation of dizziness, shortness of breath, weakness with nausea and vomiting. Episodes had been worsening over the past couple of weeks.  History of 5 previous back surgery, alcohol use disorder, diabetes mellitus associated with pancreatic disease, HTN, obesity, hyponatremia, syncope.   Patient describes history of chronic back pain dating back 25 years, over this past year pain has worsened with dizziness.  Describes pain in lower back that feels \"like I've been kicked in the back pain a horse\"  pain radiates down his right leg. Patient was diagnosed and treated for shingles 5/19/22.     Patient unable to walk and stand for extended periods of time.        he patient does not smoke tobacco, does use current marijuana use and has history of alcohol use disorder.       Reviewed alcohol use.  Patient identifies wine every couple weeks (about 3 glasses) and drinks beer daily.  He denies drinking alcohol for pain control.  He no longer drinks \"hard liquor\"  He doesn't see alcohol as a problem in his life currently.  Will need to continue to reinforce abstinence especially with addition of lyrica and opioids.    Per Neurosurgery, \"As no acute worsening of symptoms, no red flags, I think we can consider conservative management and follow up outpatient. Recommend PT and pain control.\"   Discussed with Neurosurgery patient's recent episodes of bowel incontinence.  Suggest steroid but unable to due with current UTI    PLAN:   1) Pain is consistent with Acute on chronic back pain, " worsening over the past few months. This is associated with stable pain radiating to the buttocks. History of multiple previous decompressive laminectomy, MRI with advanced spondylosis and high-grade canal stenosis at L2/L3-L3/L4. Per H&P, Suspect Dizziness/unsteady gaits related to neurogenic claudication with unsteady gait secondary to spinal stenosis, recent admission for same symptoms, unremarkable.     2)Multimodal Medication Therapy  Topical: discontinue lidocaine patch, Alternate topical lidocaine ointment and diclofenac gel QID  NSAID'S: none - hgb 14, crcl  67.7  Muscle Relaxants: Methocarbamol 500 mg po QID prn  Adjuvants: APAP 975 mg po TID, trial lyrica 25 mg po BID, hydroxyzine 25-50 mg po q8h prn   Antidepressants/anxiolytics: cymbalta 120 mg po daily- can cause/worsemn/contribute to hyponatremia  Opioids: oxycodone 5 mg every four hours prn  IV Pain medication: discontinue IV dilaudid - discontinue  3)Non-medication interventions  Acupuncture consult- as available Mon and Thursday  Integrative consult - if agreeable  4)Constipation Prophylaxis: prn ordered  5) Follow up   -Discharge Recommendations - We recommend prescribing the following at the time of discharge: TBD, follow up NRS        MN  pulled from system on 6/10/22.  This indicated no opioid use or controlled medication usage in the last 12 months.          History of Present Illness (HPI):       Donovan Jane is a 71 year old male with acute on chronic back pain. Patient has long history of chronic pain.  5 previous back surgeries.  Last 5-6 years ago with removal of instrumentation.  Pain is limiting function.  Does not use any assist devices for ambulation.    Per MN  review, the patient is opioid naive.      Per chart review, Patient was admitted to [facility] on 5/19 -5/20/22 for evaluation of nausea, vomiting, diarrhea and a syncopal episode. Patient was noted to have SHABANA and hyponatremia. Echo did not show any concerning  abnormalities or arrhythmias. Patient was clinically improved and discharged home.      Review of medical record/Summary of labs and care everywhere.     Home pain meds include: cymbalta 120 mg po daily, atarax 25-50 mg po q8h prn itching, lidocaine patch x 1      Medical History  Patient Active Problem List    Diagnosis Date Noted     Hypomagnesemia 06/09/2022     Priority: Medium     Lightheadedness 06/09/2022     Priority: Medium     Acute cystitis without hematuria 06/09/2022     Priority: Medium     Gastrointestinal hemorrhage, unspecified gastrointestinal hemorrhage type 05/20/2022     Priority: Medium     Hyponatremia 05/19/2022     Priority: Medium     Syncope, unspecified syncope type 05/19/2022     Priority: Medium     Urinary retention 05/19/2022     Priority: Medium     Alcohol use disorder, severe, dependence (H) 07/23/2018     Priority: Medium     Anxiety 07/23/2018     Priority: Medium     HTN (hypertension) 07/23/2018     Priority: Medium     Insomnia 07/23/2018     Priority: Medium     Type 2 diabetes mellitus (H) 06/03/2017     Priority: Medium     Abnormal electrocardiogram 02/10/2017     Priority: Medium     Overview:   Overview:   due to citalopram/seroquel         Alcohol-induced acute pancreatitis without infection or necrosis 02/10/2017     Priority: Medium     Nicotine dependence, uncomplicated 02/10/2017     Priority: Medium     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) 01/15/2016     Priority: Medium     Obesity (BMI 30-39.9) 09/07/2014     Priority: Medium        Surgical History  He  has a past surgical history that includes back surgery.     Past Surgical History:   Procedure Laterality Date     BACK SURGERY         Allergies  No Known Allergies    Prior to Admission Medications   Medications Prior to Admission   Medication Sig Dispense Refill Last Dose     albuterol (PROAIR HFA/PROVENTIL HFA/VENTOLIN HFA) 108 (90 Base) MCG/ACT inhaler Inhale 2 puffs into the lungs every  "4 hours as needed for shortness of breath / dyspnea or wheezing        aspirin 81 MG EC tablet [ASPIRIN 81 MG EC TABLET] Take 81 mg by mouth daily.   6/9/2022 at 1200     atorvastatin (LIPITOR) 40 MG tablet [ATORVASTATIN (LIPITOR) 40 MG TABLET] Take 40 mg by mouth daily.   6/9/2022 at 1200     DULoxetine (CYMBALTA) 60 MG capsule Take 120 mg by mouth daily   6/9/2022 at 1200     hydrOXYzine (ATARAX) 25 MG tablet Take 25-50 mg by mouth every 8 hours as needed for itching   6/9/2022 at 1200     insulin glargine (LANTUS; BASAGLAR) 100 unit/mL (3 mL) pen [INSULIN GLARGINE (LANTUS; BASAGLAR) 100 UNIT/ML (3 ML) PEN] Inject 15 Units under the skin every morning.   6/9/2022 at 1200     Lidocaine (LIDOCARE) 4 % Patch Place 1 patch onto the skin every 24 hours To prevent lidocaine toxicity, patient should be patch free for 12 hrs daily. 15 patch 0 Past Week     lisinopril (ZESTRIL) 2.5 MG tablet Take 4 tablets (10 mg) by mouth daily (Patient taking differently: Take 5 mg by mouth daily) 30 tablet 1 6/9/2022 at 1200       Social History  Reviewed, and he  reports that he quit smoking about 3 years ago. He has a 22.50 pack-year smoking history. He has never used smokeless tobacco. He reports current alcohol use. He reports current drug use. Drug: Marijuana.  Social History     Tobacco Use     Smoking status: Former Smoker     Packs/day: 1.50     Years: 15.00     Pack years: 22.50     Quit date: 7/23/2018     Years since quitting: 3.8     Smokeless tobacco: Never Used   Substance Use Topics     Alcohol use: Yes       Family History  Reviewed, and family history is not on file.    Review of Systems  Complete ROS reviewed, unless noted, all other systems reviewed and found to be negative.        Objective:     Physical Exam:  /81 (BP Location: Left arm)   Pulse 76   Temp 97.6  F (36.4  C) (Oral)   Resp 18   Ht 1.727 m (5' 8\")   Wt 89.2 kg (196 lb 9.6 oz)   SpO2 95%   BMI 29.89 kg/m    Weight:   Weight change: -1.542 kg " (-3 lb 6.4 oz)  Body mass index is 29.89 kg/m .      General Appearance:  Alert, cooperative, pleasant, increased pain with repositioning in bed   Head:  Normocephalic, without obvious abnormality, atraumatic   Eyes:  PERRL, conjunctiva/corneas clear, EOM's intact   ENT/Throat: Lips, mucosa, and tongue normal; teeth and gums normal   Lymph/Neck: Supple, symmetrical, trachea midline   Lungs:   Respirations unlabored   Chest Wall:  No tenderness or deformity   Cardiovascular/Heart:  Regular rate and rhythm. Edema: none   Abdomen:   Soft, non-tender, non distended   Musculoskeletal: Spine and extremities normal, good tone, equal strength   Skin: Skin color, texture, turgor normal, no rashes or lesions   Neurologic: Reflexes intact, cooridanated movement   Alert and oriented X 3       Psych: Affect is appropriate to circumstance            Imaging Reviewed   MR Brain w/o Contrast    Result Date: 6/9/2022  EXAM: MR BRAIN W/O CONTRAST LOCATION: [facility] DATE/TIME: 6/9/2022 5:43 PM INDICATION: Vertigo. COMPARISON: MRI brain 05/21/2022. TECHNIQUE: Routine multiplanar multisequence head MRI without intravenous contrast.     IMPRESSION: 1.  No finding for intracranial hemorrhage, mass, or acute infarct. 2.  Mild to moderate volume loss and mild presumed sequela chronic microvascular ischemic change. 3.  Moderate fluid or mucosal thickening left mastoid sinus compatible with inflammatory change and similar to prior.    MR Brain w/o Contrast    Result Date: 5/21/2022  EXAM: MR BRAIN W/O CONTRAST LOCATION: [facility] DATE/TIME: 5/21/2022 5:44 PM INDICATION: Syncope, nausea, vomiting, headache, lightheadedness. COMPARISON: None. TECHNIQUE: Routine multiplanar multisequence head MRI without intravenous contrast.    IMPRESSION: 1.  No acute intracranial process. 2.  Generalized brain atrophy and presumed microvascular ischemic changes as detailed above.    Echocardiogram  Complete    Result Date: 2022  [id number]^EDYTA^WHIT^[place] [address]  Name: EDYTA MATHEW MRN: [id number] : 1950 Study Date: 2022 10:47 AM Age: 71 yrs Gender: Male Patient [facility] Reason For Study: Syncope and Collapse Ordering Physician: WHIT LAN Referring Physician: WHIT LAN Performed By:   BSA: 2.1 m2 Height: 68 in Weight: 215 lb HR: 80 ______________________________________________________________________________ Procedure Complete Echo Adult. Definity (NDC #98495-577) given intravenously. Technically difficult study. Poor acoustic windows. ______________________________________________________________________________ Interpretation Summary  Technically difficult study. Poor acoustic windows. Left ventricular function is normal.The ejection fraction is 55-60%. Normal right ventricle size and systolic function. Valve structures generally not well visualized. Doppler imaging does not suggest the presence of hemodynamically significant stenosis or regurgitation. There is no comparison study available.    Left Ventricle The left ventricle is normal in size. Left ventricular function is normal.The ejection fraction is 55-60%. Left ventricular diastolic function is abnormal. No regional wall motion abnormalities noted.  Right Ventricle Normal right ventricle size and systolic function.  Atria Normal left atrial size. Right atrial size is normal.  Mitral Valve Mitral valve leaflets appear normal. There is no evidence of mitral stenosis or clinically significant mitral regurgitation.  Tricuspid Valve The tricuspid valve is not well visualized, but is grossly normal. Right ventricular systolic pressure could not be approximated due to inadequate tricuspid regurgitation.  Aortic Valve The aortic valve is not well visualized. No aortic stenosis is present.  Pulmonic Valve The pulmonic valve is not  well visualized.  Vessels The aorta root cannot be assessed. The thoracic aorta cannot be assessed.  Pericardium There is no pericardial effusion.    Report approved by: Cristin Boyer 05/20/2022 12:03 PM       US Abdominal Aorta Imaging    Result Date: 5/20/2022  EXAM: US ABDOMINAL AORTA LOCATION: [facility] DATE/TIME: 5/20/2022 12:41 AM INDICATION: Syncope. COMPARISON: None. TECHNIQUE: Transverse and longitudinal images of the aorta. Color flow and spectral Doppler with waveform analysis. FINDINGS: No abdominal aortic aneurysm. Mild atherosclerotic changes in the abdominal aorta. MEASUREMENTS: Proximal Aorta: 2.6 x 2.4 cm. Mid Aorta: 2.1 x 2.0 cm. Distal Aorta: 1.9 x 1.7 cm. Right Common Iliac Artery: 1.2 x 1.1 cm. Left Common Iliac Artery: 1.1 x 1.0 cm.     IMPRESSION: Normal caliber abdominal aorta.     MR Lumbar Spine w/o & w Contrast    Result Date: 6/9/2022  EXAM: MR LUMBAR SPINE W/O and W CONTRAST LOCATION: [facility] DATE/TIME: 6/9/2022 5:42 PM INDICATION: Back pain. Sagittal anesthesia. Incontinence. COMPARISON: None. CONTRAST: 10ml Gadavist TECHNIQUE: Routine Lumbar Spine MRI without and with IV contrast. FINDINGS: Nomenclature is based on 5 lumbar type vertebral bodies. There is mild convex right curvature centered at the L1-L2 level with slight rightward subluxation of L2 on L3. Slight retrolisthesis L1 on L2. Otherwise normal lumbar lordosis. There is approximately 30% chronic height loss along the anterior-inferior aspect of L1. Slight height loss central superior endplate of L2. Prominent ventral spurring at these levels and extensive Modic type III changes/sclerosis are seen along the apposing endplates. Fluid signal is seen within the central and ventral aspect of the disc space and there is a small amount of enhancement along the apposing endplates. Although fluid signal within the disc may be seen in the setting of infection, given  the  associated facet degenerative changes at this level and lack of stranding of the prevertebral soft tissues, findings are favored to be reflect chronic degenerative change rather than infection. There is ankylosis across the ventral aspect of L5-S1. Modic type II changes are seen posteriorly at L4-L5 and L3-L4. Vertebral body heights are otherwise preserved. No pars defects. Postsurgical changes are seen at the L4 and L5 levels and patient is status post left-sided decompressive laminectomy at L4 and left-sided laminectomy and medial facetectomy at L5. Left-sided laminectomy and medial facetectomy L1-L2. There is atrophy of the paramedian dorsal paraspinal musculature with more generalized atrophy and below the L4 level. Psoas muscles are normal in appearance. Visualized aorta is normal in caliber. Visualized osseous pelvis is unremarkable. Distal spinal cord is unremarkable. Conus terminates at the upper L1 level. T12-L1: Moderate interspace narrowing. Loss of normal disc T2 prolongation. Shallow posterior disc bulge. No facet arthropathy. No spinal canal or neural foraminal stenosis. L1-L2: Advanced interspace narrowing. Fluid signal is seen within the central and ventral aspect of the interspace. Mild posterior interbody spurring with slight retrolisthesis L1 on L2. Prior decompressive laminectomy and left-sided facetectomy. Mild right-sided facet arthropathy. Canal is well decompressed dorsally. Mild right foraminal stenosis. Mild left foraminal stenosis. L2-L3: Mild to moderate interspace narrowing. Loss of normal disc T2 prolongation. Moderate-sized broad-based posterior disc bulge partially effaces the caudal foramina. Mild facet arthropathy and ligamentum flavum thickening. Severe spinal canal stenosis (axial image 27). Moderate right and mild left neural foraminal stenosis. L3-L4: Mild posterior interspace narrowing. Loss of normal disc T2 prolongation. Shallow broad-based posterior disc herniation. Moderate  right and mild left-sided facet arthropathy and ligamentum flavum thickening. Severe spinal canal stenosis, greater on the right. Moderate to severe right foraminal stenosis. Moderate left foraminal stenosis. L4-L5: Mild interspace narrowing. Loss of normal disc T2 prolongation. Shallow broad-based posterior disc bulge with small left paracentral annular fissure. Prior left-sided laminectomy. Advanced right and mild left-sided facet arthropathy with ankylosis  across the facet on the right.  Lateral recess narrowing without spinal canal stenosis. No spinal canal stenosis. Severe right foraminal stenosis. Moderate to severe left foraminal stenosis. L5-S1: Advanced interspace narrowing. Partial ankylosis across the interspace. Mild posterior interbody spurring. Prior laminectomy and left-sided medial facetectomy. Moderate right-sided facet arthropathy with probable ankylosis across the facet. Heterogeneous signal along the medial aspect of the residual left-sided facet is favored to reflect granulation tissue along a slightly dilated left S1 nerve root and favored to reflect postoperative change. No spinal canal stenosis. No right foraminal stenosis. No left foraminal stenosis.     IMPRESSION: 1.  Advanced lumbar spondylosis with high-grade canal stenosis at L2-L3 and L3-L4 and multilevel moderate or high-grade foraminal stenosis as described. 2.  There is mild chronic height loss anterior-inferior aspect of L1. There is fluid signal within the central and ventral aspect of the L1-L2 intervertebral disc space with marginal endplate enhancement at this level. Although fluid signal is seen in the disc space in the setting of infection, the appearance of associated chronic endplate degenerative changes at this level, lack of endplate edema, and lack of prevertebral fat stranding favor degenerative change. 3.  Prior decompressive laminectomies at L1-L2 and L4-L5 and L5-S1. Canal is well decompressed dorsally at these  levels.    CTA Head Neck with Contrast    Result Date: 5/19/2022  EXAM: CTA  HEAD NECK WITH CONTRAST LOCATION: [facility] DATE/TIME: 5/19/2022 8:54 PM INDICATION: Headache. Syncopal episode. COMPARISON: Cervical spine CT on the same date CONTRAST: isovue [id number]  75ml TECHNIQUE: Head and neck CT angiogram with IV contrast. Noncontrast head CT followed by axial helical CT images of the head and neck vessels obtained during the arterial phase of intravenous contrast administration. Axial 2D reconstructed images and multiplanar 3D MIP reconstructed images of the head and neck vessels were performed by the technologist. Dose reduction techniques were used. All stenosis measurements made according to NASCET criteria unless otherwise specified. FINDINGS: NONCONTRAST HEAD CT: INTRACRANIAL CONTENTS: No intracranial hemorrhage, extraaxial collection, or mass effect.  No CT evidence of acute infarct. Mild presumed chronic small vessel ischemic changes. Mild generalized volume loss. No hydrocephalus. VISUALIZED ORBITS/SINUSES/MASTOIDS: No intraorbital abnormality. Air-fluid level left maxillary sinus. Scattered fluid/membrane thickening in the left mastoid air cells. No apparent mass in the posterior nasopharynx or skull base. BONES/SOFT TISSUES: No acute abnormality. HEAD CTA: ANTERIOR CIRCULATION: No stenosis/occlusion, aneurysm, or high flow vascular malformation. Standard [place] anatomy. POSTERIOR CIRCULATION: No stenosis/occlusion, aneurysm, or high flow vascular malformation. Dominant right and smaller left vertebral artery contribute to a normal basilar artery. DURAL VENOUS SINUSES: Expected enhancement of the major dural venous sinuses. NECK CTA: RIGHT CAROTID: No measurable stenosis or dissection. LEFT CAROTID: No measurable stenosis or dissection. VERTEBRAL ARTERIES: No focal stenosis or dissection. Dominant right and smaller left vertebral arteries. AORTIC ARCH: Classic aortic arch  anatomy with no significant stenosis at the origin of the great vessels. NONVASCULAR STRUCTURES: Unremarkable.     IMPRESSION: HEAD CT: 1.  No acute intracranial process. 2.  Moderate air-fluid level left maxillary sinus. Correlate for symptoms of acute sinusitis. HEAD CTA: 1.  No stenosis/occlusion, aneurysm, or high flow vascular malformation. NECK CTA: 1.  No measurable stenosis or dissection.    Cervical spine CT w/o contrast    Result Date: 5/19/2022  EXAM: CT CERVICAL SPINE W/O CONTRAST LOCATION: [facility] DATE/TIME: 5/19/2022 8:56 PM INDICATION: Neck pain, headache COMPARISON: None. TECHNIQUE: Routine CT Cervical Spine without IV contrast. Multiplanar reformats. Dose reduction techniques were used. FINDINGS: VERTEBRA: Reversal of the usual cervical lordosis. Vertebral body height is normal. 2 mm anterolisthesis of C2, 2 mm retrolisthesis of C5 and C6 No fracture or posttraumatic subluxation. CANAL/FORAMINA: Mild central canal stenosis at C5-6. There is severe foraminal stenosis on the right at C5-6, bilaterally at C6-7 and on the left at C3-4 and C2-3. PARASPINAL: Partial opacification of the left mastoid air cells.     IMPRESSION: 1.  No fracture or posttraumatic subluxation. 2.  Reversal of the usual cervical lordosis. 3.  Multilevel foraminal stenosis as detailed above..    CT Chest Pulmonary Embolism w Contrast    Result Date: 5/19/2022  EXAM: CT CHEST PULMONARY EMBOLISM W CONTRAST LOCATION: [facility] DATE/TIME: 5/19/2022 8:56 PM INDICATION: Shortness of breath with exertion, syncope COMPARISON: None. TECHNIQUE: CT chest pulmonary angiogram during arterial phase injection of IV contrast. Multiplanar reformats and MIP reconstructions were performed. Dose reduction techniques were used. CONTRAST: isovue [id number]  75ml FINDINGS: ANGIOGRAM CHEST: No evidence for pulmonary embolism. Pulmonary arteries normal in caliber. Thoracic aorta normal in caliber. No aortic  dissection or other acute abnormality. HEART: Cardiac chambers within normal limits. No pericardial effusion. Severe coronary artery calcification. MEDIASTINUM: No adenopathy or mass. Small hiatal hernia. LUNGS AND PLEURA: No pulmonary mass, consolidation, or suspicious pulmonary nodule. No pleural effusion or pneumothorax. LIMITED UPPER ABDOMEN: Mild bilateral adrenal gland thickening. There are a few calcified granulomas in the spleen. MUSCULOSKELETAL: Severe degenerative change in the right glenohumeral joint. There are left sixth and seventh and eighth rib fractures laterally, chronic.     IMPRESSION: 1.  No evidence for pulmonary embolism.      Labs Reviewed Personally By Myself  Results for orders placed or performed during the hospital encounter of 06/09/22   MR Lumbar Spine w/o & w Contrast     Status: None    Narrative    EXAM: MR LUMBAR SPINE W/O and W CONTRAST  LOCATION: [facility]  DATE/TIME: 6/9/2022 5:42 PM    INDICATION: Back pain. Sagittal anesthesia. Incontinence.  COMPARISON: None.  CONTRAST: 10ml Gadavist  TECHNIQUE: Routine Lumbar Spine MRI without and with IV contrast.    FINDINGS:   Nomenclature is based on 5 lumbar type vertebral bodies. There is mild convex right curvature centered at the L1-L2 level with slight rightward subluxation of L2 on L3. Slight retrolisthesis L1 on L2. Otherwise normal lumbar lordosis.    There is approximately 30% chronic height loss along the anterior-inferior aspect of L1. Slight height loss central superior endplate of L2. Prominent ventral spurring at these levels and extensive Modic type III changes/sclerosis are seen along the   apposing endplates. Fluid signal is seen within the central and ventral aspect of the disc space and there is a small amount of enhancement along the apposing endplates. Although fluid signal within the disc may be seen in the setting of infection, given   the associated facet degenerative changes at this level  and lack of stranding of the prevertebral soft tissues, findings are favored to be reflect chronic degenerative change rather than infection.    There is ankylosis across the ventral aspect of L5-S1. Modic type II changes are seen posteriorly at L4-L5 and L3-L4. Vertebral body heights are otherwise preserved. No pars defects.    Postsurgical changes are seen at the L4 and L5 levels and patient is status post left-sided decompressive laminectomy at L4 and left-sided laminectomy and medial facetectomy at L5. Left-sided laminectomy and medial facetectomy L1-L2. There is atrophy of   the paramedian dorsal paraspinal musculature with more generalized atrophy and below the L4 level. Psoas muscles are normal in appearance. Visualized aorta is normal in caliber. Visualized osseous pelvis is unremarkable.    Distal spinal cord is unremarkable. Conus terminates at the upper L1 level.    T12-L1: Moderate interspace narrowing. Loss of normal disc T2 prolongation. Shallow posterior disc bulge. No facet arthropathy. No spinal canal or neural foraminal stenosis.    L1-L2: Advanced interspace narrowing. Fluid signal is seen within the central and ventral aspect of the interspace. Mild posterior interbody spurring with slight retrolisthesis L1 on L2. Prior decompressive laminectomy and left-sided facetectomy. Mild   right-sided facet arthropathy. Canal is well decompressed dorsally. Mild right foraminal stenosis. Mild left foraminal stenosis.    L2-L3: Mild to moderate interspace narrowing. Loss of normal disc T2 prolongation. Moderate-sized broad-based posterior disc bulge partially effaces the caudal foramina. Mild facet arthropathy and ligamentum flavum thickening. Severe spinal canal   stenosis (axial image 27). Moderate right and mild left neural foraminal stenosis.    L3-L4: Mild posterior interspace narrowing. Loss of normal disc T2 prolongation. Shallow broad-based posterior disc herniation. Moderate right and mild  left-sided facet arthropathy and ligamentum flavum thickening. Severe spinal canal stenosis, greater   on the right. Moderate to severe right foraminal stenosis. Moderate left foraminal stenosis.    L4-L5: Mild interspace narrowing. Loss of normal disc T2 prolongation. Shallow broad-based posterior disc bulge with small left paracentral annular fissure. Prior left-sided laminectomy. Advanced right and mild left-sided facet arthropathy with ankylosis   across the facet on the right.  Lateral recess narrowing without spinal canal stenosis. No spinal canal stenosis. Severe right foraminal stenosis. Moderate to severe left foraminal stenosis.    L5-S1: Advanced interspace narrowing. Partial ankylosis across the interspace. Mild posterior interbody spurring. Prior laminectomy and left-sided medial facetectomy. Moderate right-sided facet arthropathy with probable ankylosis across the facet.   Heterogeneous signal along the medial aspect of the residual left-sided facet is favored to reflect granulation tissue along a slightly dilated left S1 nerve root and favored to reflect postoperative change. No spinal canal stenosis. No right foraminal   stenosis. No left foraminal stenosis.      Impression    IMPRESSION:  1.  Advanced lumbar spondylosis with high-grade canal stenosis at L2-L3 and L3-L4 and multilevel moderate or high-grade foraminal stenosis as described.    2.  There is mild chronic height loss anterior-inferior aspect of L1. There is fluid signal within the central and ventral aspect of the L1-L2 intervertebral disc space with marginal endplate enhancement at this level. Although fluid signal is seen in   the disc space in the setting of infection, the appearance of associated chronic endplate degenerative changes at this level, lack of endplate edema, and lack of prevertebral fat stranding favor degenerative change.    3.  Prior decompressive laminectomies at L1-L2 and L4-L5 and L5-S1. Canal is well decompressed  dorsally at these levels.   MR Brain w/o Contrast     Status: None    Narrative    EXAM: MR BRAIN W/O CONTRAST  LOCATION: [facility]  DATE/TIME: 6/9/2022 5:43 PM    INDICATION: Vertigo.  COMPARISON: MRI brain 05/21/2022.  TECHNIQUE: Routine multiplanar multisequence head MRI without intravenous contrast.    FINDINGS:  INTRACRANIAL CONTENTS: No areas of abnormally restricted diffusion to suggest acute or subacute infarct. No areas of abnormal parenchymal susceptibility.    Mild prominence of the lateral ventricles and sulci. Small amount of FLAIR hyperintensity seen within the periventricular white matter and central zari, nonspecific but compatible with areas of gliosis or chronic myelin loss. Cerebellar tonsils are   normally positioned. Visualized upper cervical spinal cord, sella, and corpus callosum are unremarkable. Major skull base vascular flow voids are preserved.    OSSEOUS STRUCTURES/SOFT TISSUES: Normal marrow signal.    ORBITS: No abnormality accounting for technique.     SINUSES/MASTOIDS: Mild paranasal sinus mucosal thickening. Moderate fluid or mucosal thickening left mastoid tip compatible with inflammatory change. No finding for an obstructing lesion at the level the left fossa of Rosenmuller.      Impression    IMPRESSION:  1.  No finding for intracranial hemorrhage, mass, or acute infarct.    2.  Mild to moderate volume loss and mild presumed sequela chronic microvascular ischemic change.    3.  Moderate fluid or mucosal thickening left mastoid sinus compatible with inflammatory change and similar to prior.   Comprehensive metabolic panel     Status: Abnormal   Result Value Ref Range    Sodium 128 (L) 136 - 145 mmol/L    Potassium 4.4 3.5 - 5.0 mmol/L    Chloride 96 (L) 98 - 107 mmol/L    Carbon Dioxide (CO2) 22 22 - 31 mmol/L    Anion Gap 10 5 - 18 mmol/L    Urea Nitrogen 10 8 - 28 mg/dL    Creatinine 1.12 0.70 - 1.30 mg/dL    Calcium 10.2 8.5 - 10.5 mg/dL    Glucose 152  (H) 70 - 125 mg/dL    Alkaline Phosphatase 78 45 - 120 U/L    AST 18 0 - 40 U/L    ALT 15 0 - 45 U/L    Protein Total 7.2 6.0 - 8.0 g/dL    Albumin 3.9 3.5 - 5.0 g/dL    Bilirubin Total 0.5 0.0 - 1.0 mg/dL    GFR Estimate 71 >60 mL/min/1.73m2   Lipase     Status: Abnormal   Result Value Ref Range    Lipase 115 (H) 0 - 52 U/L   INR     Status: Normal   Result Value Ref Range    INR 1.00 0.85 - 1.15   Lactic acid whole blood     Status: Abnormal   Result Value Ref Range    Lactic Acid 2.3 (H) 0.7 - 2.0 mmol/L   Troponin I     Status: Normal   Result Value Ref Range    Troponin I 0.02 0.00 - 0.29 ng/mL   Magnesium     Status: Abnormal   Result Value Ref Range    Magnesium 1.4 (L) 1.8 - 2.6 mg/dL   UA with Microscopic reflex to Culture     Status: Abnormal    Specimen: Urine, Midstream   Result Value Ref Range    Color Urine Light Yellow Colorless, Straw, Light Yellow, Yellow    Appearance Urine Clear Clear    Glucose Urine Negative Negative mg/dL    Bilirubin Urine Negative Negative    Ketones Urine Negative Negative mg/dL    Specific Gravity Urine 1.011 1.001 - 1.030    Blood Urine Negative Negative    pH Urine 7.0 5.0 - 7.0    Protein Albumin Urine Negative Negative mg/dL    Urobilinogen Urine <2.0 <2.0 mg/dL    Nitrite Urine Positive (A) Negative    Leukocyte Esterase Urine 250 Raya/uL (A) Negative    Bacteria Urine Few (A) None Seen /HPF    RBC Urine 0 <=2 /HPF    WBC Urine 47 (H) <=5 /HPF    Squamous Epithelials Urine <1 <=1 /HPF    Narrative    Urine Culture ordered based on laboratory criteria   [place] Draw     Status: None    Narrative    The following orders were created for panel order [place] Draw.  Procedure                               Abnormality         Status                     ---------                               -----------         ------                     Extra Blue Top Tube[[id number]]                              Final result               Extra Red Top Tube[[id number]]                                Final result               Extra Green Top (Lithium...[[id number]]                      Final result               Extra Purple Top Tube[[id number]]                            Final result                 Please view results for these tests on the individual orders.   CBC with platelets and differential     Status: Abnormal   Result Value Ref Range    WBC Count 3.8 (L) 4.0 - 11.0 10e3/uL    RBC Count 4.48 4.40 - 5.90 10e6/uL    Hemoglobin 14.7 13.3 - 17.7 g/dL    Hematocrit 41.9 40.0 - 53.0 %    MCV 94 78 - 100 fL    MCH 32.8 26.5 - 33.0 pg    MCHC 35.1 31.5 - 36.5 g/dL    RDW 13.6 10.0 - 15.0 %    Platelet Count 262 150 - 450 10e3/uL    % Neutrophils 45 %    % Lymphocytes 34 %    % Monocytes 18 %    % Eosinophils 3 %    % Basophils 0 %    % Immature Granulocytes 0 %    NRBCs per 100 WBC 0 <1 /100    Absolute Neutrophils 1.7 1.6 - 8.3 10e3/uL    Absolute Lymphocytes 1.3 0.8 - 5.3 10e3/uL    Absolute Monocytes 0.7 0.0 - 1.3 10e3/uL    Absolute Eosinophils 0.1 0.0 - 0.7 10e3/uL    Absolute Basophils 0.0 0.0 - 0.2 10e3/uL    Absolute Immature Granulocytes 0.0 <=0.4 10e3/uL    Absolute NRBCs 0.0 10e3/uL   Extra Blue Top Tube     Status: None   Result Value Ref Range    Hold Specimen JIC    Extra Red Top Tube     Status: None   Result Value Ref Range    Hold Specimen JIC    Extra Green Top (Lithium Heparin) Tube     Status: None   Result Value Ref Range    Hold Specimen JIC    Extra Purple Top Tube     Status: None   Result Value Ref Range    Hold Specimen JIC    Symptomatic; Yes; 5/26/2022 COVID-19 Virus (Coronavirus) by PCR Nasopharyngeal     Status: Normal    Specimen: Nasopharyngeal; Swab   Result Value Ref Range    SARS CoV2 PCR Negative Negative    Narrative    Testing was performed using the tristan  SARS-CoV-2 & Influenza A/B Assay on the [employer]irst[employer]  [employer]aren  System.  This test should be ordered for the detection of SARS-COV-2 in individuals who meet SARS-CoV-2 clinical and/or epidemiological criteria. Test performance  is unknown in asymptomatic patients.  This test is for in vitro diagnostic use under the FDA EUA for laboratories certified under CLIA to perform moderate and/or high complexity testing. This test has not been FDA cleared or approved.  A negative test does not rule out the presence of PCR inhibitors in the specimen or target RNA in concentration below the limit of detection for the assay. The possibility of a false negative should be considered if the patient's recent exposure or clinical presentation suggests COVID-19.  [facility] are certified under the Clinical Laboratory Improvement Amendments of 1988 (CLIA-88) as qualified to perform moderate and/or high complexity laboratory testing.   Alcohol level blood     Status: Normal   Result Value Ref Range    Alcohol, Blood <10 None detected mg/dL   Lactic acid whole blood     Status: Normal   Result Value Ref Range    Lactic Acid 0.8 0.7 - 2.0 mmol/L   Vitamin B12     Status: Normal   Result Value Ref Range    Vitamin B12 430 213 - 816 pg/mL   Glucose by meter     Status: Abnormal   Result Value Ref Range    GLUCOSE BY METER POCT 141 (H) 70 - 99 mg/dL   Magnesium     Status: Normal   Result Value Ref Range    Magnesium 1.8 1.8 - 2.6 mg/dL   TSH     Status: Abnormal   Result Value Ref Range    TSH 6.25 (H) 0.30 - 5.00 uIU/mL   Cortisol     Status: None   Result Value Ref Range    Cortisol 26.0 ug/dL   Osmolality urine     Status: Normal   Result Value Ref Range    Osmolality Urine 333 100 - 1,200 mmol/kg    Narrative    Reference Ranges depend on patient's hydration status and renal function.   Neonates:  mmol/kg   2 years and older, random specimens: 100-1200 mmol/kg; Greater than 850 mmol/kg after 12 hour fluid restriction  Urine/serum osmolality ratio: 2 years and older: 1.0-3.0; 3.0-4.7 after 12 hour fluid restriction   Osmolality     Status: Abnormal   Result Value Ref Range    Osmolality Blood 277 (L) 280 - 301 mmol/kg    Narrative     Greater than 385 mmol/kg relates to stupor in hyperglycemia   Greater than 400 mmol/kg can relate to seizures   Greater than 420 mmol/kg can be lethal    Serum Osmalar Gap:   Normal <10   Larger suggest unmeasured substances present in serum (ethanol, methanol, isopropanol, mannitol, ethylene glycol).   Sodium random urine     Status: None   Result Value Ref Range    Sodium Urine mmol/L 72 mmol/L   Glucose by meter     Status: Abnormal   Result Value Ref Range    GLUCOSE BY METER POCT 193 (H) 70 - 99 mg/dL   Glucose by meter     Status: Abnormal   Result Value Ref Range    GLUCOSE BY METER POCT 181 (H) 70 - 99 mg/dL   Glucose by meter     Status: Abnormal   Result Value Ref Range    GLUCOSE BY METER POCT 103 (H) 70 - 99 mg/dL   Basic metabolic panel     Status: Abnormal   Result Value Ref Range    Sodium 129 (L) 136 - 145 mmol/L    Potassium 4.0 3.5 - 5.0 mmol/L    Chloride 98 98 - 107 mmol/L    Carbon Dioxide (CO2) 23 22 - 31 mmol/L    Anion Gap 8 5 - 18 mmol/L    Urea Nitrogen 12 8 - 28 mg/dL    Creatinine 0.91 0.70 - 1.30 mg/dL    Calcium 9.5 8.5 - 10.5 mg/dL    Glucose 120 70 - 125 mg/dL    GFR Estimate >90 >60 mL/min/1.73m2   Magnesium     Status: Normal   Result Value Ref Range    Magnesium 1.8 1.8 - 2.6 mg/dL   CBC with platelets and differential     Status: Abnormal   Result Value Ref Range    WBC Count 3.2 (L) 4.0 - 11.0 10e3/uL    RBC Count 4.03 (L) 4.40 - 5.90 10e6/uL    Hemoglobin 13.0 (L) 13.3 - 17.7 g/dL    Hematocrit 38.3 (L) 40.0 - 53.0 %    MCV 95 78 - 100 fL    MCH 32.3 26.5 - 33.0 pg    MCHC 33.9 31.5 - 36.5 g/dL    RDW 13.5 10.0 - 15.0 %    Platelet Count 205 150 - 450 10e3/uL    % Neutrophils 40 %    % Lymphocytes 35 %    % Monocytes 17 %    % Eosinophils 7 %    % Basophils 1 %    % Immature Granulocytes 0 %    NRBCs per 100 WBC 0 <1 /100    Absolute Neutrophils 1.3 (L) 1.6 - 8.3 10e3/uL    Absolute Lymphocytes 1.1 0.8 - 5.3 10e3/uL    Absolute Monocytes 0.5 0.0 - 1.3 10e3/uL    Absolute  Eosinophils 0.2 0.0 - 0.7 10e3/uL    Absolute Basophils 0.0 0.0 - 0.2 10e3/uL    Absolute Immature Granulocytes 0.0 <=0.4 10e3/uL    Absolute NRBCs 0.0 10e3/uL   ECG 12-LEAD WITH MUSE (LHE)     Status: None   Result Value Ref Range    Systolic Blood Pressure 133 mmHg    Diastolic Blood Pressure 84 mmHg    Ventricular Rate 103 BPM    Atrial Rate 103 BPM    SD Interval 154 ms    QRS Duration 72 ms     ms    QTc 445 ms    P Axis 33 degrees    R AXIS -12 degrees    T Axis 54 degrees    Interpretation ECG       Sinus tachycardia  Low voltage QRS  Borderline ECG  When compared with ECG of 19-MAY-2022 23:58,  No significant change was found  Confirmed by SEE ED PROVIDER NOTE FOR, ECG INTERPRETATION ([id number]),  KENNEDY BASSETT ([id number]) on 6/10/2022 7:31:19 AM     Urine Culture     Status: None (Preliminary result)    Specimen: Urine, Midstream   Result Value Ref Range    Culture Culture in progress    CBC with platelets differential     Status: Abnormal    Narrative    The following orders were created for panel order CBC with platelets differential.  Procedure                               Abnormality         Status                     ---------                               -----------         ------                     CBC with platelets and d...[[id number]]  Abnormal            Final result                 Please view results for these tests on the individual orders.   CBC with platelets differential     Status: Abnormal    Narrative    The following orders were created for panel order CBC with platelets differential.  Procedure                               Abnormality         Status                     ---------                               -----------         ------                     CBC with platelets and d...[[id number]]  Abnormal            Final result                 Please view results for these tests on the individual orders.          Total time spent 82 minutes with greater than 50% in consultation,  education and coordination of care.     Also discussed with RN, MD and  time spent in discussion with Acute Inpatient Pain Pharmacist.     Thank you for this consultation.      Joyce PARISH, CNS-BC, DNP  Acute Care Pain Management Program  [facility] ([facility])   With questions call [phone]  Preference if for [facility]  Click HERE to page Cely

## 2022-06-11 NOTE — PROGRESS NOTES
PRIMARY DIAGNOSIS: GENERALIZED WEAKNESS    OUTPATIENT/OBSERVATION GOALS TO BE MET BEFORE DISCHARGE  1. Orthostatic performed: No    2. Tolerating PO medications: Yes    3. Return to near baseline physical activity: No    4. Cleared for discharge by consultants (if involved): No    Discharge Planner Nurse   Safe discharge environment identified: No  Barriers to discharge: Yes       Entered by: Comfort Paul RN 06/11/2022 12:51 PM  Plan of care pending - reports no dizziness this AM, but has not been out of bed as of this note. Pain team to see.  Please review provider order for any additional goals.   Nurse to notify provider when observation goals have been met and patient is ready for discharge.

## 2022-06-11 NOTE — PROGRESS NOTES
"Short visit with patient to follow up on fecal incontinence reported to pain team. Patient states he has stool leakage the last few weeks. Sometimes associated with a cough. Urinary incontinence he feels the last few weeks has been associated with not being able to get to a restroom fast enough. Denies saddle paresthesia. Denies groin N/T. Full strength in legs. No numbness or tingling other than right side buttocks which is chronic for him. He states his back pain has been worse than usual the last two days. He denies any trauma. He is being treated for acute cystitis. He has full strength of LE on my exam. He endorses 5 back surgeries, the last 3 at [facility] with Orthopedic's. He had planned what sounds like ALIF at [facility] but has put it off for \"13 years\" because he is fearful of abdominal approach. He does have a component of scoliosis on imaging. Unclear benefit/need need to see a deformity surgeon. He is open to seeing our team as an OP. He would like to proceed with surgery so he can return to his functional lifestyle.Focus while IP is pain management, PT. Unable to have steroids per hosptitalist due to cystitis. Pain team following. Admitted for dizziness - treated for electrolyte abnormality, cystitis. Case discussed with Dr Baker yesterday. Will arrange OP follow up.     MERLIN Berrios  [facility] Neurosurgery  O: [phone]    "

## 2022-06-11 NOTE — PROGRESS NOTES
[facility]      OUTPATIENT OCCUPATIONAL THERAPY  EVALUATION  PLAN OF TREATMENT FOR OUTPATIENT REHABILITATION  (COMPLETE FOR INITIAL CLAIMS ONLY)  Patient's Last Name, First Name, M.I.  YOB: 1950  BuckDonovan  RUI                          Provider's Name  [facility] Medical Record No.  [id number]                               Onset Date:      Start of Care Date:  (P) 06/11/22     Type:     ___PT   _X_OT   ___SLP Medical Diagnosis:                           OT Diagnosis:      Visits from SOC:  1   _________________________________________________________________________________  Plan of Treatment/Functional Goals    Planned Interventions:     Goals: See Occupational Therapy Goals on Care Plan in [facility] electronic health record.    Therapy Frequency:    Predicted Duration of Therapy Intervention:    _________________________________________________________________________________    I CERTIFY THE NEED FOR THESE SERVICES FURNISHED UNDER        THIS PLAN OF TREATMENT AND WHILE UNDER MY CARE     (Physician co-signature of this document indicates review and certification of the therapy plan).               ,                   Initial Assessment        See Occupational Therapy evaluation dated (P) 06/11/22 in Epic electronic health record.

## 2022-06-11 NOTE — PROGRESS NOTES
PRIMARY DIAGNOSIS: GENERALIZED WEAKNESS    OUTPATIENT/OBSERVATION GOALS TO BE MET BEFORE DISCHARGE  1. Orthostatic performed: Yes:    See flowsheet, patient does report mild dizziness upon sitting, slightly worse when standing.    2. Tolerating PO medications: Yes  Received new PRN medication - Oxycodone for back pain as ordered by pain team. Patient states pain improved after this medication in conjunction with scheduled meds (Lidocaine, Voltaren, Tylenol).    3. Return to near baseline physical activity: No  Working with PT/OT.     4. Cleared for discharge by consultants (if involved): No    Discharge Planner Nurse   Safe discharge environment identified: No  Barriers to discharge: Yes       Entered by: Comfort Paul RN 06/11/2022 12:52 PM  1200cc fluid restriction initiated, patient understands and has been compliant since this has been in effect.   Please review provider order for any additional goals.   Nurse to notify provider when observation goals have been met and patient is ready for discharge.

## 2022-06-12 ENCOUNTER — APPOINTMENT (OUTPATIENT)
Dept: PHYSICAL THERAPY | Facility: HOSPITAL | Age: 72
DRG: 552 | End: 2022-06-12
Payer: MEDICARE

## 2022-06-12 ENCOUNTER — TELEPHONE (OUTPATIENT)
Dept: FAMILY MEDICINE | Facility: CLINIC | Age: 72
End: 2022-06-12

## 2022-06-12 VITALS
RESPIRATION RATE: 18 BRPM | SYSTOLIC BLOOD PRESSURE: 127 MMHG | HEIGHT: 68 IN | BODY MASS INDEX: 29.8 KG/M2 | OXYGEN SATURATION: 94 % | WEIGHT: 196.6 LBS | DIASTOLIC BLOOD PRESSURE: 92 MMHG | TEMPERATURE: 97.5 F | HEART RATE: 78 BPM

## 2022-06-12 LAB
BACTERIA UR CULT: ABNORMAL
GLUCOSE BLDC GLUCOMTR-MCNC: 102 MG/DL (ref 70–99)
GLUCOSE BLDC GLUCOMTR-MCNC: 139 MG/DL (ref 70–99)
MAGNESIUM SERPL-MCNC: 2 MG/DL (ref 1.8–2.6)
SODIUM SERPL-SCNC: 134 MMOL/L (ref 136–145)

## 2022-06-12 PROCEDURE — 97535 SELF CARE MNGMENT TRAINING: CPT | Mod: GO

## 2022-06-12 PROCEDURE — 97110 THERAPEUTIC EXERCISES: CPT | Mod: GP

## 2022-06-12 PROCEDURE — 99239 HOSP IP/OBS DSCHRG MGMT >30: CPT | Performed by: INTERNAL MEDICINE

## 2022-06-12 PROCEDURE — 97110 THERAPEUTIC EXERCISES: CPT | Mod: GO

## 2022-06-12 PROCEDURE — 83735 ASSAY OF MAGNESIUM: CPT | Performed by: INTERNAL MEDICINE

## 2022-06-12 PROCEDURE — 250N000013 HC RX MED GY IP 250 OP 250 PS 637: Performed by: CLINICAL NURSE SPECIALIST

## 2022-06-12 PROCEDURE — 97116 GAIT TRAINING THERAPY: CPT | Mod: GP

## 2022-06-12 PROCEDURE — 36415 COLL VENOUS BLD VENIPUNCTURE: CPT | Performed by: INTERNAL MEDICINE

## 2022-06-12 PROCEDURE — 250N000013 HC RX MED GY IP 250 OP 250 PS 637: Performed by: INTERNAL MEDICINE

## 2022-06-12 PROCEDURE — 84295 ASSAY OF SERUM SODIUM: CPT | Performed by: INTERNAL MEDICINE

## 2022-06-12 PROCEDURE — 99232 SBSQ HOSP IP/OBS MODERATE 35: CPT | Performed by: CLINICAL NURSE SPECIALIST

## 2022-06-12 PROCEDURE — 250N000013 HC RX MED GY IP 250 OP 250 PS 637: Performed by: STUDENT IN AN ORGANIZED HEALTH CARE EDUCATION/TRAINING PROGRAM

## 2022-06-12 RX ORDER — OXYCODONE HYDROCHLORIDE 5 MG/1
5 TABLET ORAL EVERY 4 HOURS PRN
Qty: 28 TABLET | Refills: 0 | Status: SHIPPED | OUTPATIENT
Start: 2022-06-12 | End: 2022-06-19

## 2022-06-12 RX ORDER — CIPROFLOXACIN 250 MG/1
250 TABLET, FILM COATED ORAL EVERY 12 HOURS SCHEDULED
Status: DISCONTINUED | OUTPATIENT
Start: 2022-06-12 | End: 2022-06-12 | Stop reason: HOSPADM

## 2022-06-12 RX ORDER — LISINOPRIL 2.5 MG/1
2.5 TABLET ORAL DAILY
Start: 2022-06-12 | End: 2022-07-05

## 2022-06-12 RX ORDER — CIPROFLOXACIN 250 MG/1
250 TABLET, FILM COATED ORAL EVERY 12 HOURS
Qty: 10 TABLET | Refills: 0 | Status: SHIPPED | OUTPATIENT
Start: 2022-06-12 | End: 2022-06-17

## 2022-06-12 RX ORDER — PREGABALIN 25 MG/1
50 CAPSULE ORAL 2 TIMES DAILY
Qty: 28 CAPSULE | Refills: 1 | Status: SHIPPED | OUTPATIENT
Start: 2022-06-12 | End: 2022-06-20

## 2022-06-12 RX ORDER — NICOTINE 21 MG/24HR
1 PATCH, TRANSDERMAL 24 HOURS TRANSDERMAL DAILY
COMMUNITY
Start: 2022-06-13 | End: 2022-07-05

## 2022-06-12 RX ORDER — LISINOPRIL 2.5 MG/1
5 TABLET ORAL DAILY
Start: 2022-06-12 | End: 2022-06-12

## 2022-06-12 RX ADMIN — NICOTINE 1 PATCH: 14 PATCH, EXTENDED RELEASE TRANSDERMAL at 08:01

## 2022-06-12 RX ADMIN — OXYCODONE HYDROCHLORIDE 5 MG: 5 TABLET ORAL at 05:39

## 2022-06-12 RX ADMIN — ATORVASTATIN CALCIUM 40 MG: 40 TABLET, FILM COATED ORAL at 07:59

## 2022-06-12 RX ADMIN — DULOXETINE 120 MG: 60 CAPSULE, DELAYED RELEASE ORAL at 08:00

## 2022-06-12 RX ADMIN — LIDOCAINE: 50 OINTMENT TOPICAL at 10:10

## 2022-06-12 RX ADMIN — OXYCODONE HYDROCHLORIDE 5 MG: 5 TABLET ORAL at 10:09

## 2022-06-12 RX ADMIN — ACETAMINOPHEN 975 MG: 325 TABLET ORAL at 14:09

## 2022-06-12 RX ADMIN — DICLOFENAC SODIUM 2 G: 10 GEL TOPICAL at 12:04

## 2022-06-12 RX ADMIN — INSULIN GLARGINE 10 UNITS: 100 INJECTION, SOLUTION SUBCUTANEOUS at 08:09

## 2022-06-12 RX ADMIN — ACETAMINOPHEN 975 MG: 325 TABLET ORAL at 08:00

## 2022-06-12 RX ADMIN — CIPROFLOXACIN HYDROCHLORIDE 250 MG: 250 TABLET, FILM COATED ORAL at 10:09

## 2022-06-12 RX ADMIN — DICLOFENAC SODIUM 2 G: 10 GEL TOPICAL at 05:41

## 2022-06-12 RX ADMIN — OXYCODONE HYDROCHLORIDE 5 MG: 5 TABLET ORAL at 14:09

## 2022-06-12 RX ADMIN — METHOCARBAMOL 500 MG: 500 TABLET ORAL at 07:59

## 2022-06-12 RX ADMIN — Medication 400 MG: at 08:01

## 2022-06-12 RX ADMIN — ASPIRIN 81 MG: 81 TABLET, COATED ORAL at 07:59

## 2022-06-12 RX ADMIN — PREGABALIN 25 MG: 25 CAPSULE ORAL at 08:00

## 2022-06-12 ASSESSMENT — ACTIVITIES OF DAILY LIVING (ADL)
ADLS_ACUITY_SCORE: 43

## 2022-06-12 NOTE — PLAN OF CARE
Problem: Pain Acute  Goal: Acceptable Pain Control and Functional Ability  Outcome: Ongoing, Progressing  Patient reports improvement in overall pain with current regimen. Tolerated ambulating in rush with staff, minimal dizziness. Pain team following.    Problem: Plan of Care - These are the overarching goals to be used throughout the patient stay.    Goal: Readiness for Transition of Care  Outcome: Ongoing, Progressing  Patient verbalizes understanding of plan of care.  Compliant with fluid restriction that was initiated this morning for hyponatremia  X1 post void residual obtained - 55cc (need one more).    Comfort Paul RN

## 2022-06-12 NOTE — DISCHARGE SUMMARY
[facility] MEDICINE  DISCHARGE SUMMARY     Primary Care Physician: Smitha Hameed  Admission Date: 6/9/2022   Discharge Provider: Thad Malagon MD Discharge Date: 6/12/2022   Diet: Given below   Code Status: Full Code   Activity: As tolerated        Condition at Discharge: Stable     REASON FOR PRESENTATION(See Admission Note for Details)   Low back pain    PRINCIPAL & ACTIVE DISCHARGE DIAGNOSES     Active Problems:    Hyponatremia    Hypomagnesemia    Lightheadedness    Acute cystitis without hematuria  Acute on chronic low back pain    SIGNIFICANT FINDINGS (Imaging, labs):   MR Brain w/o Contrast    Result Date: 6/9/2022  EXAM: MR BRAIN W/O CONTRAST LOCATION: [facility] DATE/TIME: 6/9/2022 5:43 PM INDICATION: Vertigo. COMPARISON: MRI brain 05/21/2022. TECHNIQUE: Routine multiplanar multisequence head MRI without intravenous contrast. FINDINGS: INTRACRANIAL CONTENTS: No areas of abnormally restricted diffusion to suggest acute or subacute infarct. No areas of abnormal parenchymal susceptibility. Mild prominence of the lateral ventricles and sulci. Small amount of FLAIR hyperintensity seen within the periventricular white matter and central zari, nonspecific but compatible with areas of gliosis or chronic myelin loss. Cerebellar tonsils are normally positioned. Visualized upper cervical spinal cord, sella, and corpus callosum are unremarkable. Major skull base vascular flow voids are preserved. OSSEOUS STRUCTURES/SOFT TISSUES: Normal marrow signal. ORBITS: No abnormality accounting for technique. SINUSES/MASTOIDS: Mild paranasal sinus mucosal thickening. Moderate fluid or mucosal thickening left mastoid tip compatible with inflammatory change. No finding for an obstructing lesion at the level the left fossa of Rosenmuller.     IMPRESSION: 1.  No finding for intracranial hemorrhage, mass, or acute infarct. 2.  Mild to moderate volume loss and mild  presumed sequela chronic microvascular ischemic change. 3.  Moderate fluid or mucosal thickening left mastoid sinus compatible with inflammatory change and similar to prior.    MR Lumbar Spine w/o & w Contrast    Result Date: 6/9/2022  EXAM: MR LUMBAR SPINE W/O and W CONTRAST LOCATION: [facility] DATE/TIME: 6/9/2022 5:42 PM INDICATION: Back pain. Sagittal anesthesia. Incontinence. COMPARISON: None. CONTRAST: 10ml Gadavist TECHNIQUE: Routine Lumbar Spine MRI without and with IV contrast. FINDINGS: Nomenclature is based on 5 lumbar type vertebral bodies. There is mild convex right curvature centered at the L1-L2 level with slight rightward subluxation of L2 on L3. Slight retrolisthesis L1 on L2. Otherwise normal lumbar lordosis. There is approximately 30% chronic height loss along the anterior-inferior aspect of L1. Slight height loss central superior endplate of L2. Prominent ventral spurring at these levels and extensive Modic type III changes/sclerosis are seen along the apposing endplates. Fluid signal is seen within the central and ventral aspect of the disc space and there is a small amount of enhancement along the apposing endplates. Although fluid signal within the disc may be seen in the setting of infection, given  the associated facet degenerative changes at this level and lack of stranding of the prevertebral soft tissues, findings are favored to be reflect chronic degenerative change rather than infection. There is ankylosis across the ventral aspect of L5-S1. Modic type II changes are seen posteriorly at L4-L5 and L3-L4. Vertebral body heights are otherwise preserved. No pars defects. Postsurgical changes are seen at the L4 and L5 levels and patient is status post left-sided decompressive laminectomy at L4 and left-sided laminectomy and medial facetectomy at L5. Left-sided laminectomy and medial facetectomy L1-L2. There is atrophy of the paramedian dorsal paraspinal musculature  with more generalized atrophy and below the L4 level. Psoas muscles are normal in appearance. Visualized aorta is normal in caliber. Visualized osseous pelvis is unremarkable. Distal spinal cord is unremarkable. Conus terminates at the upper L1 level. T12-L1: Moderate interspace narrowing. Loss of normal disc T2 prolongation. Shallow posterior disc bulge. No facet arthropathy. No spinal canal or neural foraminal stenosis. L1-L2: Advanced interspace narrowing. Fluid signal is seen within the central and ventral aspect of the interspace. Mild posterior interbody spurring with slight retrolisthesis L1 on L2. Prior decompressive laminectomy and left-sided facetectomy. Mild right-sided facet arthropathy. Canal is well decompressed dorsally. Mild right foraminal stenosis. Mild left foraminal stenosis. L2-L3: Mild to moderate interspace narrowing. Loss of normal disc T2 prolongation. Moderate-sized broad-based posterior disc bulge partially effaces the caudal foramina. Mild facet arthropathy and ligamentum flavum thickening. Severe spinal canal stenosis (axial image 27). Moderate right and mild left neural foraminal stenosis. L3-L4: Mild posterior interspace narrowing. Loss of normal disc T2 prolongation. Shallow broad-based posterior disc herniation. Moderate right and mild left-sided facet arthropathy and ligamentum flavum thickening. Severe spinal canal stenosis, greater on the right. Moderate to severe right foraminal stenosis. Moderate left foraminal stenosis. L4-L5: Mild interspace narrowing. Loss of normal disc T2 prolongation. Shallow broad-based posterior disc bulge with small left paracentral annular fissure. Prior left-sided laminectomy. Advanced right and mild left-sided facet arthropathy with ankylosis  across the facet on the right.  Lateral recess narrowing without spinal canal stenosis. No spinal canal stenosis. Severe right foraminal stenosis. Moderate to severe left foraminal stenosis. L5-S1: Advanced  interspace narrowing. Partial ankylosis across the interspace. Mild posterior interbody spurring. Prior laminectomy and left-sided medial facetectomy. Moderate right-sided facet arthropathy with probable ankylosis across the facet. Heterogeneous signal along the medial aspect of the residual left-sided facet is favored to reflect granulation tissue along a slightly dilated left S1 nerve root and favored to reflect postoperative change. No spinal canal stenosis. No right foraminal stenosis. No left foraminal stenosis.     IMPRESSION: 1.  Advanced lumbar spondylosis with high-grade canal stenosis at L2-L3 and L3-L4 and multilevel moderate or high-grade foraminal stenosis as described. 2.  There is mild chronic height loss anterior-inferior aspect of L1. There is fluid signal within the central and ventral aspect of the L1-L2 intervertebral disc space with marginal endplate enhancement at this level. Although fluid signal is seen in the disc space in the setting of infection, the appearance of associated chronic endplate degenerative changes at this level, lack of endplate edema, and lack of prevertebral fat stranding favor degenerative change. 3.  Prior decompressive laminectomies at L1-L2 and L4-L5 and L5-S1. Canal is well decompressed dorsally at these levels.      PENDING LABS         PROCEDURES ( this hospitalization only)          RECOMMENDATIONS TO OUTPATIENT PROVIDER FOR F/U VISIT     As given below    DISPOSITION     Home with home care    SUMMARY OF HOSPITAL COURSE:      71-year-old male past medical history of chronic back pain with multiple previous back surgeries, alcohol use disorder, type II DM, hypertension, obesity presents with worsening back pain and dizziness     Acute on chronic back pain, with neurogenic claudication  -history of multiple previous decompressive laminectomy, MRI with advanced spondylosis and high-grade canal stenosis at L2/L3-L3/L4.   --Neurosurgery consulted and no urgent surgical  intervention  planned   -Cannot use steroids due to acute bacterial cystitis  -- Improved with the use of oxycodone and Lyrica  -Lidocaine patch did not work and therefore it was discontinued  -- Patient was rating his pain as 3 at the time of discharge     dizziness/unsteady gaits  -Recently admitted for same, work-ups were unremarkable.  Suspect related to neurogenic claudication with unsteady gait secondary to spinal stenosis.   --  Had lisinopril in the hospital.  Patient denies any dizziness  at discharge.  Reduce lisinopril to 2.5 mg once daily at discharge  --Vitamin B12 is normal at 430     Acute cystitis without hematuria  -Initially treated with Rocephin but urine culture grew staph epi epidermidis  --Therefore change Rocephin to ciprofloxacin.  Patient completed 2 days of antibiotics in the hospital and therefore will need 5 more days of ciprofloxacin at home.        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  Recommended cutting down on beer to 1 day  -Sodium level at discharge was 134.  Recheck at primary care's office     Elevated lactic acid  -Mildly elevated on admission but improved 0.8 with IV hydration     Current smoker  --Patient counseled to quit  -- Patient also given nicotine patch in the hospital.  Order nicotine patch at discharge     Insulin-dependent diabetes  -- Controlled        Lab Results   Component Value Date     A1C 6.5 05/20/2022     A1C 6.8 07/24/2018   --  Resume home regimen at  discharge      Discharge Medications with Med changes:        Review of your medicines      START taking      Dose / Directions   ciprofloxacin 250 MG tablet  Commonly known as: CIPRO  Indication: Urinary Tract Infection      Dose: 250 mg  Take 1 tablet (250 mg) by mouth every 12 hours for 5 days  Quantity: 10 tablet  Refills: 0     nicotine 14 MG/24HR 24 hr patch  Commonly known as: NICODERM CQ  Used for: Encounter for smoking cessation counseling      Dose: 1 patch  Start taking on: June 13, 2022  Place 1 patch onto the skin daily  Refills: 0     oxyCODONE 5 MG tablet  Commonly known as: ROXICODONE  Used for: Chronic pain syndrome, Back pain with radiculopathy      Dose: 5 mg  Take 1 tablet (5 mg) by mouth every 4 hours as needed for moderate to severe pain  Quantity: 28 tablet  Refills: 0     pregabalin 25 MG capsule  Commonly known as: LYRICA  Used for: Chronic pain syndrome, Back pain with radiculopathy      Dose: 50 mg  Take 2 capsules (50 mg) by mouth 2 times daily  Quantity: 28 capsule  Refills: 1        CONTINUE these medicines which may have CHANGED, or have new prescriptions. If we are uncertain of the size of tablets/capsules you have at home, strength may be listed as something that might have changed.      Dose / Directions   lisinopril 2.5 MG tablet  Commonly known as: ZESTRIL  This may have changed: how much to take  Used for: Syncope, unspecified syncope type      Dose: 2.5 mg  Take 1 tablet (2.5 mg) by mouth daily  Refills: 0        CONTINUE these medicines which have NOT CHANGED      Dose / Directions   albuterol 108 (90 Base) MCG/ACT inhaler  Commonly known as: PROAIR HFA/PROVENTIL HFA/VENTOLIN HFA      Dose: 2 puff  Inhale 2 puffs into the lungs every 4 hours as needed for shortness of breath / dyspnea or wheezing  Refills: 0     aspirin 81 MG EC tablet  Commonly known as: ASA      Dose: 81 mg  [ASPIRIN 81 MG EC TABLET] Take 81 mg by mouth daily.  Refills: 0     atorvastatin 40 MG  tablet  Commonly known as: LIPITOR      Dose: 40 mg  [ATORVASTATIN (LIPITOR) 40 MG TABLET] Take 40 mg by mouth daily.  Refills: 0     DULoxetine 60 MG capsule  Commonly known as: CYMBALTA      Dose: 120 mg  Take 120 mg by mouth daily  Refills: 0     hydrOXYzine 25 MG tablet  Commonly known as: ATARAX      Dose: 25-50 mg  Take 25-50 mg by mouth every 8 hours as needed for itching  Refills: 0     insulin glargine 100 UNIT/ML pen  Commonly known as: LANTUS PEN      Dose: 15 Units  [INSULIN GLARGINE (LANTUS; BASAGLAR) 100 UNIT/ML (3 ML) PEN] Inject 15 Units under the skin every morning.  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  Quantity: 15 patch  Refills: 0           Where to get your medicines      These medications were sent to [place] [id number] [place] [id number] [facility] [address]    Phone: [phone]     ciprofloxacin 250 MG tablet    oxyCODONE 5 MG tablet    pregabalin 25 MG capsule     Some of these will need a paper prescription and others can be bought over the counter. Ask your nurse if you have questions.    You don't need a prescription for these medications    nicotine 14 MG/24HR 24 hr patch             Rationale for medication changes:      Lisinopril reduced to 2.5 mg due to dizziness  Oxycodone and Lyrica added for acute on chronic back pain        Consults   Neurosurgery and pain team      Immunizations given this encounter     Immunization History   Administered Date(s) Administered     COVID-19,PF,Moderna 02/24/2021, 03/24/2021          Anticoagulation Information      Recent INR results:   Recent Labs   Lab Test 06/09/22  1503 05/19/22  1802 07/23/18  1301   INR 1.00 0.99 0.96     Warfarin doses (if applicable) or name of  "other anticoagulant: Not applicable      Discharge Orders     Discharge Procedure Orders   Follow-up and recommended labs and tests    Order Comments: Follow up with primary care provider, Smitha Hamede, within 7 days for hospital follow- up.  The following labs/tests are recommended: BMP.     Activity   Order Comments: Your activity upon discharge: activity as tolerated and no driving for today     Order Specific Question Answer Comments   Is discharge order? Yes      Reason for your hospital stay   Order Comments: Low back pain     Discharge Instructions   Order Comments: Reduce alcohol intake to 1 beer only     Resume Home Care Services   Order Comments: Resume home care services such as OT PT     Diet   Order Comments: Follow this diet upon discharge: Orders Placed This Encounter      Fluid restriction 1500 ML FLUID      Combination Diet Regular Diet Adult; Moderate Consistent Carb (60 g CHO per Meal) Diet     Order Specific Question Answer Comments   Is discharge order? Yes      Examination     Vital Signs in last 24 hours:   Vital signs:  Temp: 97.5  F (36.4  C) Temp src: Oral BP: (!) 127/92 Pulse: 78   Resp: 18 SpO2: 94 % O2 Device: None (Room air)   Height: 172.7 cm (5' 8\") Weight: 89.2 kg (196 lb 9.6 oz)  Estimated body mass index is 29.89 kg/m  as calculated from the following:    Height as of this encounter: 1.727 m (5' 8\").    Weight as of this encounter: 89.2 kg (196 lb 9.6 oz).        General appearance: alert, appears stated age and cooperative       Please see EMR for more detailed significant labs, imaging, consultant notes etc.  Total time spent on discharge: 35 minutes    Thad Malagon MD   [facility] Service: Ph:[phone]    CC:Smitha Hameed    "

## 2022-06-12 NOTE — PROGRESS NOTES
Chart review. Sounds like patient is planning home with home care. Bladder scans were normal. We will see patient OP to discuss surgical options once UTI has cleared. Discussed with Dr Baker yesterday.     MERLIN Berrios  [facility] Neurosurgery  O: [phone]

## 2022-06-12 NOTE — PROGRESS NOTES
Care Management Discharge Note    Discharge Date: 06/12/2022       Discharge Disposition:  HC resumption     Discharge Services: PT, OT      Discharge DME: none      Discharge Transportation: family or friend will provide.     Private pay costs discussed: Not applicable    Education Provided on the Discharge Plan:  yes  Persons Notified of Discharge Plans: yes  Patient/Family in Agreement with the Plan:  yes    Handoff Referral Completed: Yes    Additional Information:    SW reviewed chart and plan id for pt to discharge home with resumption of HC (PT,OT)     Orders faxed.     Mary Alice Morales LGSW

## 2022-06-12 NOTE — DISCHARGE INSTRUCTIONS
If you plan to continue your care with Neurosurgery we will call you to set up follow up appointment to discuss surgical options. Please call us with any questions or concerns if you do not hear from us.   Call [phone] Neurosurgery with the following symptoms:  *Worsening pain   *Worsening or new onset of weakness, or numbness and tingling  *Loss or change in your ability to control bowel or bladder function      Reinforced alcohol abstinence with addition of pain medications.    Monitor for side effects especially dizziness which can be attributed to the lyrica.

## 2022-06-12 NOTE — PLAN OF CARE
Occupational Therapy Discharge Summary    Reason for therapy discharge:    Discharged to home.    Progress towards therapy goal(s). See goals on Care Plan in [facility] electronic health record for goal details.  Goals met    Therapy recommendation(s):    No further therapy is recommended.

## 2022-06-12 NOTE — TELEPHONE ENCOUNTER
Reason for call:  Other   Patient called regarding (reason for call): appointment  Additional comments: Hospital Follow up/ Est Care.  Pt discharged 6/12 From [facility], back pain and dizziness. Would like to schedule follow up/est care appt at [facility], any available provider. Needs follow up within 7 days    Phone number to reach patient:  Home number on file [phone] (home)    Best Time: any    Can we leave a detailed message on this number?  YES    Travel screening: Negative

## 2022-06-12 NOTE — PLAN OF CARE
Problem: Plan of Care - These are the overarching goals to be used throughout the patient stay.    Goal: Optimal Comfort and Wellbeing  Outcome: Ongoing, Progressing     Problem: Pain Acute  Goal: Acceptable Pain Control and Functional Ability  Outcome: Ongoing, Progressing   Goal Outcome Evaluation:      Pt did not complain of pain through the shift, he had a sleeping pill upon request. Was awake for a while then went to sleep. Still on mg and Na protocol.

## 2022-06-12 NOTE — PROGRESS NOTES
"[facility] ACUTE PAIN SERVICE    ([facility], [facility], [facility])   Daily PAIN Progress Note    Assessment/Plan:  Donovan Jane is a 71 year old male who was admitted on 6/9/2022.   Pain Service is asked to see the patient for worsening back pain over the past 2 months. Admitted for evaluation of dizziness, shortness of breath, weakness with nausea and vomiting. Episodes had been worsening over the past couple of weeks.  History of 5 previous back surgery, alcohol use disorder, diabetes mellitus associated with pancreatic disease, HTN, obesity, hyponatremia, syncope.   Patient describes history of chronic back pain dating back 25 years, over this past year pain has worsened with dizziness. Alcohol use disorder; Reviewed alcohol use.  Patient identifies wine every couple weeks (about 3 glasses) and drinks beer daily.  He denies drinking alcohol for pain control.  He no longer drinks \"hard liquor\"  He doesn't see alcohol as a problem in his life currently.  Will need to continue to reinforce abstinence especially with addition of lyrica and opioids.     In 24 hours has used 4 x 5mg oxycodone for an MME of 30.    Reviewed pain plan for dismissal from hospital.    Reinforced alcohol abstinence.  Monitor for side effects especially dizziness which can be attributed to the lyrica.    PLAN:   1) Pain is consistent with Acute on chronic back pain, worsening over the past few months. This is associated with stable pain radiating to the buttocks. History of multiple previous decompressive laminectomy, MRI with advanced spondylosis and high-grade canal stenosis at L2/L3-L3/L4. Per H&P, Suspect Dizziness/unsteady gaits related to neurogenic claudication with unsteady gait secondary to spinal stenosis, recent admission for same symptoms, unremarkable.    2)Multimodal Medication Therapy  Topical: discontinue lidocaine patch, Alternate topical lidocaine ointment and diclofenac gel QID  NSAID'S: none - hgb 14, " crcl  67.7  Muscle Relaxants: Methocarbamol 500 mg po QID prn  Adjuvants: APAP 975 mg po TID, trial lyrica 5- mg po BID, hydroxyzine 25-50 mg po q8h prn   Antidepressants/anxiolytics: cymbalta 120 mg po daily- can cause/worsemn/contribute to hyponatremia  Opioids: oxycodone 5 mg every four hours prn  IV Pain medication: none  3)Non-medication interventions  Acupuncture consult- as available Mon and Thursday  Integrative consult - if agreeable  4)Constipation Prophylaxis: prn ordered  5) Follow up   -Discharge Recommendations - We recommend prescribing the following at the time of discharge:  follow up [facility], Primary Care Provider  Script provided for oxycodone #28 tablets  Lyrica 50 mg bid refill in 2 weeks  Continue Robaxin 500 mg four times daily prn  Lidocaine and voltaren creams        MN  pulled from system on 6/10/22.  This indicated no opioid use or controlled medication usage in the last 12 months.   Active Problems:    Hyponatremia    Hypomagnesemia    Lightheadedness    Acute cystitis without hematuria     LOS: 2 days       Subjective:  Patient reports pain is much improved with the addition of the oxycodone.          acetaminophen  975 mg Oral TID     aspirin  81 mg Oral Daily     atorvastatin  40 mg Oral Daily     cefTRIAXone  1 g Intravenous Q24H     diclofenac  2 g Topical 4x Daily     DULoxetine  120 mg Oral Daily     insulin aspart  1-7 Units Subcutaneous TID AC     insulin aspart  1-5 Units Subcutaneous At Bedtime     insulin glargine  10 Units Subcutaneous QAM     lidocaine   Topical 4x Daily     magnesium oxide  400 mg Oral TID     nicotine  1 patch Transdermal Daily     nicotine   Transdermal Q8H     pregabalin  25 mg Oral BID     sodium chloride (PF)  3 mL Intracatheter Q8H       Objective:  Vital signs in last 24 hours:  Temp:  [97.5  F (36.4  C)-98  F (36.7  C)] 97.5  F (36.4  C)  Pulse:  [] 78  Resp:  [16-20] 18  BP: ()/(63-92) 127/92  SpO2:  [94 %-97 %] 94 %  Weight:   Weight  change:   Body mass index is 29.89 kg/m .    Intake/Output last 3 shifts:  I/O last 3 completed shifts:  In: 2040 [P.O.:2040]  Out: 2400 [Urine:2400]  Intake/Output this shift:  No intake/output data recorded.    Review of Systems:   As per subjective, all others negative.    Physical Exam:    General Appearance:  Alert, cooperative, pleasant, sitting up in bed   Head:  Normocephalic, without obvious abnormality, atraumatic   Eyes:  PERRL, conjunctiva/corneas clear, EOM's intact   Nose: Nares normal, septum midline, mucosa normal, no drainage   Throat: Lips, mucosa, and tongue normal; teeth and gums normal   Neck: Supple, symmetrical, trachea midline   Back:   Symmetric, no curvature, ROM normal, no CVA tenderness   Lungs:   Respirations unlabored   Abdomen:   Soft, non-tender, non distended   Extremities: Extremities normal, atraumatic, no cyanosis or edema   Skin: Skin color, texture, turgor normal, no rashes or lesions   Neurologic: Alert and oriented X 3, Moves all 4 extremities          Imaging:  Personally Reviewed.  MR Brain w/o Contrast    Result Date: 6/9/2022  EXAM: MR BRAIN W/O CONTRAST LOCATION: [facility] DATE/TIME: 6/9/2022 5:43 PM INDICATION: Vertigo. COMPARISON: MRI brain 05/21/2022. TECHNIQUE: Routine multiplanar multisequence head MRI without intravenous contrast.     IMPRESSION: 1.  No finding for intracranial hemorrhage, mass, or acute infarct. 2.  Mild to moderate volume loss and mild presumed sequela chronic microvascular ischemic change. 3.  Moderate fluid or mucosal thickening left mastoid sinus compatible with inflammatory change and similar to prior.    MR Brain w/o Contrast    Result Date: 5/21/2022  EXAM: MR BRAIN W/O CONTRAST LOCATION: [facility] DATE/TIME: 5/21/2022 5:44 PM INDICATION: Syncope, nausea, vomiting, headache, lightheadedness. COMPARISON: None. TECHNIQUE: Routine multiplanar multisequence head MRI without intravenous  contrast.    IMPRESSION: 1.  No acute intracranial process. 2.  Generalized brain atrophy and presumed microvascular ischemic changes as detailed above.    Echocardiogram Complete    Result Date: 5/20/2022      MR Lumbar Spine w/o & w Contrast    Result Date: 6/9/2022  EXAM: MR LUMBAR SPINE W/O and W CONTRAST LOCATION: [facility] DATE/TIME: 6/9/2022 5:42 PM INDICATION: Back pain. Sagittal anesthesia. Incontinence. COMPARISON: None. CONTRAST: 10ml Gadavist TECHNIQUE: Routine Lumbar Spine MRI without and with IV contrast.     IMPRESSION: 1.  Advanced lumbar spondylosis with high-grade canal stenosis at L2-L3 and L3-L4 and multilevel moderate or high-grade foraminal stenosis as described. 2.  There is mild chronic height loss anterior-inferior aspect of L1. There is fluid signal within the central and ventral aspect of the L1-L2 intervertebral disc space with marginal endplate enhancement at this level. Although fluid signal is seen in the disc space in the setting of infection, the appearance of associated chronic endplate degenerative changes at this level, lack of endplate edema, and lack of prevertebral fat stranding favor degenerative change. 3.  Prior decompressive laminectomies at L1-L2 and L4-L5 and L5-S1. Canal is well decompressed dorsally at these levels.    CTA Head Neck with Contrast    Result Date: 5/19/2022  EXAM: CTA  HEAD NECK WITH CONTRAST LOCATION: [facility] DATE/TIME: 5/19/2022 8:54 PM INDICATION: Headache. Syncopal episode. COMPARISON: Cervical spine CT on the same date CONTRAST: isovue 370  75ml     IMPRESSION: HEAD CT: 1.  No acute intracranial process. 2.  Moderate air-fluid level left maxillary sinus. Correlate for symptoms of acute sinusitis. HEAD CTA: 1.  No stenosis/occlusion, aneurysm, or high flow vascular malformation. NECK CTA: 1.  No measurable stenosis or dissection.    Cervical spine CT w/o contrast    Result Date: 5/19/2022  EXAM: CT  CERVICAL SPINE W/O CONTRAST LOCATION: [facility] DATE/TIME: 5/19/2022 8:56 PM INDICATION: Neck pain, headache COMPARISON: None. TECHNIQUE: Routine CT Cervical Spine without IV contrast. Multiplanar reformats. Dose reduction techniques were used. FINDINGS: VERTEBRA: Reversal of the usual cervical lordosis. Vertebral body height is normal. 2 mm anterolisthesis of C2, 2 mm retrolisthesis of C5 and C6 No fracture or posttraumatic subluxation. CANAL/FORAMINA: Mild central canal stenosis at C5-6. There is severe foraminal stenosis on the right at C5-6, bilaterally at C6-7 and on the left at C3-4 and C2-3. PARASPINAL: Partial opacification of the left mastoid air cells.     IMPRESSION: 1.  No fracture or posttraumatic subluxation. 2.  Reversal of the usual cervical lordosis. 3.  Multilevel foraminal stenosis as detailed above..    CT Chest Pulmonary Embolism w Contrast    Result Date: 5/19/2022  EXAM: CT CHEST PULMONARY EMBOLISM W CONTRAST LOCATION: [facility] DATE/TIME: 5/19/2022 8:56 PM INDICATION: Shortness of breath with exertion, syncope COMPARISON: None.     IMPRESSION: 1.  No evidence for pulmonary embolism.      Lab Results:  Personally Reviewed.   Recent Labs   Lab 06/11/22  0548 06/09/22  1503   WBC 3.2* 3.8*   HGB 13.0* 14.7   HCT 38.3* 41.9    262     Recent Labs   Lab 06/12/22  0529 06/11/22  0548 06/09/22  1503   * 129* 128*   CO2  --  23 22   BUN  --  12 10   ALBUMIN  --   --  3.9   ALKPHOS  --   --  78   ALT  --   --  15   AST  --   --  18     Recent Labs   Lab 06/09/22  1503   INR 1.00       Total time spent 25 minutes with greater than 50% in consultation, education and coordination of care.     Also discussed with RN and MD Joyce Ellis APRN, CNS-BC, DNP  Acute Care Pain Management Program  [facility] ([place], [place], [place])   With questions call [phone]  Preference if for [employer] Paging - Caleb  Click HERE to page Cely

## 2022-06-12 NOTE — PROGRESS NOTES
Discharge instructions reviewed with patient.  No further questions or concerns.  [place] will call patient to set up follow-up appointment tomorrow (I discussed this with care connection).   Escorted out of facility by CNA, son to escort home.    Comfort Paul RN

## 2022-06-13 ENCOUNTER — PATIENT OUTREACH (OUTPATIENT)
Dept: CARE COORDINATION | Facility: CLINIC | Age: 72
End: 2022-06-13
Payer: MEDICARE

## 2022-06-13 DIAGNOSIS — Z71.89 OTHER SPECIFIED COUNSELING: ICD-10-CM

## 2022-06-13 NOTE — PROGRESS NOTES
Clinic Care Coordination Contact  [facility]: Post-Discharge Note  SITUATION                                                      Admission:    Admission Date: 06/09/22   Reason for Admission: Low back pain  Discharge:   Discharge Date: 06/12/22  Discharge Diagnosis: Hyponatremia    Hypomagnesemia    Lightheadedness    Acute cystitis without hematuria  Acute on chronic low back pain    BACKGROUND                                                      Per hospital discharge summary and inpatient provider notes:  71-year-old male past medical history of chronic back pain with multiple previous back surgeries, alcohol use disorder, type II DM, hypertension, obesity presents with worsening back pain and dizziness     Acute on chronic back pain, with neurogenic claudication  -history of multiple previous decompressive laminectomy, MRI with advanced spondylosis and high-grade canal stenosis at L2/L3-L3/L4.   --Neurosurgery consulted and no urgent surgical intervention  planned   -Cannot use steroids due to acute bacterial cystitis  -- Improved with the use of oxycodone and Lyrica  -Lidocaine patch did not work and therefore it was discontinued  -- Patient was rating his pain as 3 at the time of discharge     dizziness/unsteady gaits  -Recently admitted for same, work-ups were unremarkable.  Suspect related to neurogenic claudication with unsteady gait secondary to spinal stenosis.   --  Had lisinopril in the hospital.  Patient denies any dizziness  at discharge.  Reduce lisinopril to 2.5 mg once daily at discharge  --Vitamin B12 is normal at 430     Acute cystitis without hematuria  -Initially treated with Rocephin but urine culture grew staph epi epidermidis  --Therefore change Rocephin to ciprofloxacin.  Patient completed 2 days of antibiotics in the hospital and therefore will need 5 more days of ciprofloxacin at home.        Hypomagnesemia  -Below on admission but now replaced                ASSESSMENT   "    Enrollment  Primary Care Care Coordination Status: Not a Candidate    Discharge Assessment  How are you doing now that you are home?: \"I'm doing okay\"  How are your symptoms? (Red Flag symptoms escalate to triage hotline per guidelines): Improved  Do you feel your condition is stable enough to be safe at home until your provider visit?: Yes  Does the patient have their discharge instructions? : Yes  Does the patient have questions regarding their discharge instructions? : No  Were you started on any new medications or were there changes to any of your previous medications? : Yes  Does the patient have all of their medications?: Yes  Do you have questions regarding any of your medications? : No  Do you have all of your needed medical supplies or equipment (DME)?  (i.e. oxygen tank, CPAP, cane, etc.): Yes  Discharge follow-up appointment scheduled within 14 calendar days? : No  Is patient agreeable to assistance with scheduling? : No    Post-op ([facility] CTA Only)  If the patient had a surgery or procedure, do they have any questions for a nurse?: No             PLAN                                                      Outpatient Plan:   Reduce alcohol intake to 1 beer only          Resume Home Care Services   Order Comments: Resume home care services such as OT PT          Diet   Order Comments: Follow this diet upon discharge: Orders Placed This Encounter      Fluid restriction 1500 ML FLUID      Combination Diet Regular Diet Adult; Moderate Consistent Carb (60 g CHO per Meal) Diet        No future appointments.      For any urgent concerns, please contact our 24 hour nurse triage line: [phone] ([phone])         Lesia Ash MA                  "

## 2022-06-16 ENCOUNTER — MEDICAL CORRESPONDENCE (OUTPATIENT)
Dept: NEUROSURGERY | Facility: CLINIC | Age: 72
End: 2022-06-16
Payer: MEDICARE

## 2022-06-20 ENCOUNTER — OFFICE VISIT (OUTPATIENT)
Dept: FAMILY MEDICINE | Facility: CLINIC | Age: 72
End: 2022-06-20
Payer: MEDICARE

## 2022-06-20 VITALS
BODY MASS INDEX: 30.52 KG/M2 | SYSTOLIC BLOOD PRESSURE: 145 MMHG | HEART RATE: 118 BPM | HEIGHT: 68 IN | WEIGHT: 201.4 LBS | OXYGEN SATURATION: 96 % | DIASTOLIC BLOOD PRESSURE: 98 MMHG

## 2022-06-20 DIAGNOSIS — Z76.89 ENCOUNTER TO ESTABLISH CARE: ICD-10-CM

## 2022-06-20 DIAGNOSIS — M54.10 BACK PAIN WITH RADICULOPATHY: ICD-10-CM

## 2022-06-20 DIAGNOSIS — G89.4 CHRONIC PAIN SYNDROME: ICD-10-CM

## 2022-06-20 DIAGNOSIS — E08.8 DIABETES MELLITUS DUE TO UNDERLYING CONDITION WITH UNSPECIFIED COMPLICATIONS (H): ICD-10-CM

## 2022-06-20 DIAGNOSIS — Z11.59 NEED FOR HEPATITIS C SCREENING TEST: ICD-10-CM

## 2022-06-20 DIAGNOSIS — D64.9 ANEMIA, UNSPECIFIED TYPE: ICD-10-CM

## 2022-06-20 DIAGNOSIS — R55 SYNCOPE, UNSPECIFIED SYNCOPE TYPE: ICD-10-CM

## 2022-06-20 DIAGNOSIS — E66.9 OBESITY (BMI 30-39.9): ICD-10-CM

## 2022-06-20 DIAGNOSIS — Z79.4 TYPE 2 DIABETES MELLITUS WITH OTHER SPECIFIED COMPLICATION, WITH LONG-TERM CURRENT USE OF INSULIN (H): ICD-10-CM

## 2022-06-20 DIAGNOSIS — R79.0 LOW MAGNESIUM LEVEL: ICD-10-CM

## 2022-06-20 DIAGNOSIS — R30.0 DYSURIA: ICD-10-CM

## 2022-06-20 DIAGNOSIS — E11.69 TYPE 2 DIABETES MELLITUS WITH OTHER SPECIFIED COMPLICATION, WITH LONG-TERM CURRENT USE OF INSULIN (H): ICD-10-CM

## 2022-06-20 DIAGNOSIS — F10.20 ALCOHOL USE DISORDER, SEVERE, DEPENDENCE (H): Primary | ICD-10-CM

## 2022-06-20 DIAGNOSIS — Z13.220 SCREENING FOR HYPERLIPIDEMIA: ICD-10-CM

## 2022-06-20 DIAGNOSIS — Z79.899 ENCOUNTER FOR LONG-TERM (CURRENT) USE OF MEDICATIONS: ICD-10-CM

## 2022-06-20 DIAGNOSIS — R11.0 NAUSEA: ICD-10-CM

## 2022-06-20 PROBLEM — E78.5 DYSLIPIDEMIA: Status: ACTIVE | Noted: 2017-06-03

## 2022-06-20 PROBLEM — K21.9 GASTRO-ESOPHAGEAL REFLUX DISEASE WITHOUT ESOPHAGITIS: Status: ACTIVE | Noted: 2017-02-10

## 2022-06-20 PROBLEM — R94.31 PROLONGED Q-T INTERVAL ON ECG: Status: ACTIVE | Noted: 2022-06-20

## 2022-06-20 PROBLEM — F17.200 TOBACCO USE DISORDER: Status: ACTIVE | Noted: 2022-06-20

## 2022-06-20 PROBLEM — F32.A DEPRESSION: Status: ACTIVE | Noted: 2022-06-20

## 2022-06-20 LAB
ALBUMIN SERPL-MCNC: 4 G/DL (ref 3.5–5)
ALBUMIN UR-MCNC: NEGATIVE MG/DL
ALP SERPL-CCNC: 75 U/L (ref 45–120)
ALT SERPL W P-5'-P-CCNC: 14 U/L (ref 0–45)
ANION GAP SERPL CALCULATED.3IONS-SCNC: 12 MMOL/L (ref 5–18)
APPEARANCE UR: CLEAR
AST SERPL W P-5'-P-CCNC: 17 U/L (ref 0–40)
BASOPHILS # BLD AUTO: 0 10E3/UL (ref 0–0.2)
BASOPHILS NFR BLD AUTO: 1 %
BILIRUB SERPL-MCNC: 0.5 MG/DL (ref 0–1)
BILIRUB UR QL STRIP: NEGATIVE
BUN SERPL-MCNC: 14 MG/DL (ref 8–28)
CALCIUM SERPL-MCNC: 10.3 MG/DL (ref 8.5–10.5)
CANNABINOIDS UR QL SCN: NORMAL
CHLORIDE BLD-SCNC: 98 MMOL/L (ref 98–107)
CHOLEST SERPL-MCNC: 182 MG/DL
CO2 SERPL-SCNC: 25 MMOL/L (ref 22–31)
COLOR UR AUTO: YELLOW
CREAT SERPL-MCNC: 1 MG/DL (ref 0.7–1.3)
CREAT UR-MCNC: 52 MG/DL
CREAT UR-MCNC: 52 MG/DL
CREAT UR-MCNC: 53 MG/DL
EOSINOPHIL # BLD AUTO: 0.2 10E3/UL (ref 0–0.7)
EOSINOPHIL NFR BLD AUTO: 4 %
ERYTHROCYTE [DISTWIDTH] IN BLOOD BY AUTOMATED COUNT: 13.8 % (ref 10–15)
FASTING STATUS PATIENT QL REPORTED: NO
GFR SERPL CREATININE-BSD FRML MDRD: 81 ML/MIN/1.73M2
GLUCOSE BLD-MCNC: 142 MG/DL (ref 70–125)
GLUCOSE BLD-MCNC: 144 MG/DL
GLUCOSE UR STRIP-MCNC: NEGATIVE MG/DL
HCT VFR BLD AUTO: 41.5 % (ref 40–53)
HDLC SERPL-MCNC: 61 MG/DL
HGB BLD-MCNC: 14.1 G/DL (ref 13.3–17.7)
HGB UR QL STRIP: NEGATIVE
IMM GRANULOCYTES # BLD: 0 10E3/UL
IMM GRANULOCYTES NFR BLD: 1 %
KETONES UR STRIP-MCNC: NEGATIVE MG/DL
LDLC SERPL CALC-MCNC: 81 MG/DL
LEUKOCYTE ESTERASE UR QL STRIP: NEGATIVE
LIPASE SERPL-CCNC: 48 U/L (ref 0–52)
LYMPHOCYTES # BLD AUTO: 1.1 10E3/UL (ref 0.8–5.3)
LYMPHOCYTES NFR BLD AUTO: 27 %
MAGNESIUM SERPL-MCNC: 1.7 MG/DL (ref 1.8–2.6)
MCH RBC QN AUTO: 32.3 PG (ref 26.5–33)
MCHC RBC AUTO-ENTMCNC: 34 G/DL (ref 31.5–36.5)
MCV RBC AUTO: 95 FL (ref 78–100)
MICROALBUMIN UR-MCNC: 5.26 MG/DL (ref 0–1.99)
MICROALBUMIN/CREAT UR: 101.2 MG/G CR
MONOCYTES # BLD AUTO: 0.6 10E3/UL (ref 0–1.3)
MONOCYTES NFR BLD AUTO: 15 %
NEUTROPHILS # BLD AUTO: 2.2 10E3/UL (ref 1.6–8.3)
NEUTROPHILS NFR BLD AUTO: 54 %
NITRATE UR QL: NEGATIVE
PH UR STRIP: 7 [PH] (ref 5–8)
PLATELET # BLD AUTO: 344 10E3/UL (ref 150–450)
POTASSIUM BLD-SCNC: 5.1 MMOL/L (ref 3.5–5)
PROT SERPL-MCNC: 7.3 G/DL (ref 6–8)
RBC # BLD AUTO: 4.37 10E6/UL (ref 4.4–5.9)
SODIUM SERPL-SCNC: 135 MMOL/L (ref 136–145)
SP GR UR STRIP: 1.01 (ref 1–1.03)
TRIGL SERPL-MCNC: 198 MG/DL
UROBILINOGEN UR STRIP-ACNC: 0.2 E.U./DL
VIT B12 SERPL-MCNC: 505 PG/ML (ref 213–816)
WBC # BLD AUTO: 4.2 10E3/UL (ref 4–11)

## 2022-06-20 PROCEDURE — 83690 ASSAY OF LIPASE: CPT | Performed by: FAMILY MEDICINE

## 2022-06-20 PROCEDURE — 36415 COLL VENOUS BLD VENIPUNCTURE: CPT | Performed by: FAMILY MEDICINE

## 2022-06-20 PROCEDURE — 80061 LIPID PANEL: CPT | Performed by: FAMILY MEDICINE

## 2022-06-20 PROCEDURE — 81003 URINALYSIS AUTO W/O SCOPE: CPT | Mod: 59 | Performed by: FAMILY MEDICINE

## 2022-06-20 PROCEDURE — 86803 HEPATITIS C AB TEST: CPT | Performed by: FAMILY MEDICINE

## 2022-06-20 PROCEDURE — 80307 DRUG TEST PRSMV CHEM ANLYZR: CPT | Performed by: FAMILY MEDICINE

## 2022-06-20 PROCEDURE — 99495 TRANSJ CARE MGMT MOD F2F 14D: CPT | Performed by: FAMILY MEDICINE

## 2022-06-20 PROCEDURE — 85025 COMPLETE CBC W/AUTO DIFF WBC: CPT | Performed by: FAMILY MEDICINE

## 2022-06-20 PROCEDURE — 82607 VITAMIN B-12: CPT | Performed by: FAMILY MEDICINE

## 2022-06-20 PROCEDURE — 80053 COMPREHEN METABOLIC PANEL: CPT | Performed by: FAMILY MEDICINE

## 2022-06-20 PROCEDURE — 83735 ASSAY OF MAGNESIUM: CPT | Performed by: FAMILY MEDICINE

## 2022-06-20 PROCEDURE — 82947 ASSAY GLUCOSE BLOOD QUANT: CPT | Mod: 59 | Performed by: FAMILY MEDICINE

## 2022-06-20 PROCEDURE — 82043 UR ALBUMIN QUANTITATIVE: CPT | Performed by: FAMILY MEDICINE

## 2022-06-20 RX ORDER — PREGABALIN 25 MG/1
50 CAPSULE ORAL 2 TIMES DAILY
Qty: 60 CAPSULE | Refills: 1 | Status: SHIPPED | OUTPATIENT
Start: 2022-06-20 | End: 2022-07-05

## 2022-06-20 RX ORDER — LANOLIN ALCOHOL/MO/W.PET/CERES
100 CREAM (GRAM) TOPICAL DAILY
Qty: 100 TABLET | Refills: 3 | Status: SHIPPED | OUTPATIENT
Start: 2022-06-20 | End: 2022-07-08

## 2022-06-20 RX ORDER — NALTREXONE HYDROCHLORIDE 50 MG/1
50 TABLET, FILM COATED ORAL DAILY
Qty: 30 TABLET | Refills: 1 | Status: SHIPPED | OUTPATIENT
Start: 2022-06-20 | End: 2022-09-15

## 2022-06-20 RX ORDER — OXYCODONE HYDROCHLORIDE 5 MG/1
5 TABLET ORAL EVERY 4 HOURS PRN
Qty: 28 TABLET | Refills: 0 | Status: CANCELLED | OUTPATIENT
Start: 2022-06-20

## 2022-06-20 RX ORDER — CYCLOBENZAPRINE HCL 5 MG
5 TABLET ORAL 3 TIMES DAILY PRN
Qty: 30 TABLET | Refills: 1 | Status: SHIPPED | OUTPATIENT
Start: 2022-06-20 | End: 2022-07-05

## 2022-06-20 RX ORDER — UREA 10 %
500 LOTION (ML) TOPICAL 2 TIMES DAILY
Qty: 30 TABLET | Refills: 1 | Status: SHIPPED | OUTPATIENT
Start: 2022-06-20 | End: 2022-06-30

## 2022-06-20 ASSESSMENT — PATIENT HEALTH QUESTIONNAIRE - PHQ9
10. IF YOU CHECKED OFF ANY PROBLEMS, HOW DIFFICULT HAVE THESE PROBLEMS MADE IT FOR YOU TO DO YOUR WORK, TAKE CARE OF THINGS AT HOME, OR GET ALONG WITH OTHER PEOPLE: SOMEWHAT DIFFICULT
SUM OF ALL RESPONSES TO PHQ QUESTIONS 1-9: 9
SUM OF ALL RESPONSES TO PHQ QUESTIONS 1-9: 9

## 2022-06-20 NOTE — PROGRESS NOTES
Assessment & Plan     ICD-10-CM    1. Alcohol use disorder, severe, dependence (H)  F10.20 Lipase     thiamine (B-1) 100 MG tablet     Vitamin B12     naltrexone (DEPADE/REVIA) 50 MG tablet     Magnesium     Lipase     Vitamin B12     Magnesium   2. Chronic pain syndrome  G89.4 [id number] - Urine Drug Confirmation Panel (Comprehensive)     CBC with platelets and differential     cyclobenzaprine (FLEXERIL) 5 MG tablet     pregabalin (LYRICA) 25 MG capsule     CBC with platelets and differential     [id number] - Urine Drug Confirmation Panel (Comprehensive)   3. Back pain with radiculopathy  M54.10 pregabalin (LYRICA) 25 MG capsule   4. Screening for hyperlipidemia  Z13.220 Lipid panel reflex to direct LDL Fasting     Lipid panel reflex to direct LDL Fasting   5. Encounter for long-term (current) use of medications  Z79.899    6. Need for hepatitis C screening test  Z11.59 Hepatitis C Screen Reflex to HCV RNA Quant and Genotype     Hepatitis C Screen Reflex to HCV RNA Quant and Genotype   7. Obesity (BMI 30-39.9)  E66.9    8. Type 2 diabetes mellitus with other specified complication, with long-term current use of insulin (H)  E11.69 Albumin Random Urine Quantitative with Creat Ratio    Z79.4 OPTOMETRY REFERRAL     Comprehensive metabolic panel (BMP + Alb, Alk Phos, ALT, AST, Total. Bili, TP)     Comprehensive metabolic panel (BMP + Alb, Alk Phos, ALT, AST, Total. Bili, TP)     Albumin Random Urine Quantitative with Creat Ratio   9. Anemia, unspecified type   D64.9 CBC with platelets and differential     CBC with platelets and differential   10. Low magnesium level  R79.0 magnesium gluconate (MAGONATE) 500 (27 Mg) MG tablet   11. Syncope, unspecified syncope type  R55 CANCELED: Basic metabolic panel   12. Nausea  R11.0 Glucose     Glucose   13. Diabetes mellitus due to underlying condition with unspecified complications (H)   E08.8 Glucose     Glucose   14. Encounter to establish care  Z76.89    15. Dysuria  R30.0 UA  Macro with Reflex to Micro and Culture - lab collect     Medical decision making: Patient is here today for follow-up of hospital discharge.  He was admitted for back pain and dizziness.  His back pain is fromu spondylotic changes and high-grade stenosis especially at L5-S1.  Otherwise also mild to moderate stenosis in the lumbar area.  He is now scheduled to see a spine clinic.  Neurosurgery saw him during hospital admission and no acute intervention was needed.  For his pain, I discussed that oxycodone is not a long-term answer.  He can use muscle relaxer for severe pain which again will not be for daily use.  He is agreeable.  Extra strength Tylenol can be used as needed.  He was started on pregabalin which she did not get a prescription as outpatient.  Prescription is again sent to the pharmacy.  His dizziness was attributed to SIADH which is chronic.  Patient also had hyponatremia which was improved with water restriction.  He does drink beer daily.  And was asked to cut down.  I discussed with the patient that most of his issues come from his alcoholism.  He agrees.  I did asked him to cut down slowly and he thinks he can slowly bring it to a beer a day.  I offered him detox which she declines currently.  He will try to work on his own.  Patient again feels that he may have some burning in the urine.  He had a UTI during hospital admission and was treated with Rocephin and then given ciprofloxacin which he has finished.  We will check a urine again.  If recurrent infection is noted, I will refer him to urology.  Regarding his diabetes, he is on 15 units of Lantus that he takes at nighttime.  Again assume this is from chronic pancreatitis.  Patient was noted to have low magnesium.  I have started him on magnesium supplement and we will recheck the magnesium today.    Background information: Patient used to live in [place] and had his care in [facility]..  He was living with his girlfriend of 21  "years who then asked him to leave.  He is now living with his son in [place].  He is single and lives in an [facility].  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  He gets pension and Social Security.  He does not drive and uses an Uber.  He does admit to alcohol being an issue and is willing to change.    Note that patient is on hydroxyzine for chronic pruritus..  I discussed and discontinued the medication noting the long QT interval and the contraindication.  We will start him on naltrexone which should help with pruritus and alcohol use.  He has been using his oxycodone sparingly and  only using 1-2 a day.  I did check PDMP and no other prescriptions have been provided.  This is reassuring as naltrexone can cause a withdrawal.  This is discussed with the patient.  He can also choose to start after stopping the oxycodone.    He was noted to have some decline in hemoglobin.  We will check this again today.    We talked about his smoking but at this time patient is willing to work on one problem at a time.    More than 60 minutes spent including chart review, patient interview, exam and documentation       Tobacco Cessation:   reports that he has been smoking cigarettes. He has a 22.50 pack-year smoking history. He has never used smokeless tobacco.  Tobacco Cessation Action Plan: Information offered: Patient not interested at this time    BMI:   Estimated body mass index is 30.62 kg/m  as calculated from the following:    Height as of this encounter: 1.727 m (5' 8\").    Weight as of this encounter: 91.4 kg (201 lb 6.4 oz).       MEDICATIONS:   Orders Placed This Encounter   Medications     thiamine (B-1) 100 MG tablet     Sig: Take 1 tablet (100 mg) by mouth daily     Dispense:  100 tablet     Refill:  3     cyclobenzaprine (FLEXERIL) 5 MG tablet     Sig: Take 1 tablet (5 mg) by mouth 3 times daily as needed for muscle spasms     Dispense:  30 tablet     Refill:  1     naltrexone (DEPADE/REVIA) 50 MG " "tablet     Sig: Take 1 tablet (50 mg) by mouth daily     Dispense:  30 tablet     Refill:  1     magnesium gluconate (MAGONATE) 500 (27 Mg) MG tablet     Sig: Take 1 tablet (500 mg) by mouth 2 times daily     Dispense:  30 tablet     Refill:  1     pregabalin (LYRICA) 25 MG capsule     Sig: Take 2 capsules (50 mg) by mouth 2 times daily     Dispense:  60 capsule     Refill:  1     Medications Discontinued During This Encounter   Medication Reason     pregabalin (LYRICA) 25 MG capsule      hydrOXYzine (ATARAX) 25 MG tablet           - Continue other medications without change  See Patient Instructions    Return in about 4 weeks (around 7/18/2022) for Follow up.    Bereket Chino MD  [facility]    Maddie Man is a 71 year old, presenting for the following health issues:  Hospital F/U ([facility] follow up, 06/09-06/12. Went in for dizzy and low back pain. Dizzy is quite a bit better, but pt's \"balls\" are numb- for about two weeks and pt feels nausea today. )      \[facility] [place] [facility]\""     Post Discharge Outreach 6/13/2022   Admission Date 6/9/2022   Reason for Admission Low back pain   Discharge Date 6/12/2022   Discharge Diagnosis Hyponatremia    Hypomagnesemia    Lightheadedness    Acute cystitis without hematuria  Acute on chronic low back pain   How are you doing now that you are home? \"I'm doing okay\"   How are your symptoms? (Red Flag symptoms escalate to triage hotline per guidelines) Improved   Do you feel your condition is stable enough to be safe at home until your provider visit? Yes   Does the patient have their discharge instructions?  Yes   Does the patient have questions regarding their discharge instructions?  No   Were you started on any new medications or were there changes to any of your previous medications?  Yes   Does the patient have all of their medications? Yes   Do you have questions regarding any of your medications?  No   Do you have all of your needed medical supplies " "or equipment (DME)?  (i.e. oxygen tank, CPAP, cane, etc.) Yes   Discharge follow-up appointment scheduled within 14 calendar days?  YES-was recommended within 7 days.     Hospital Follow-up Visit:    Hospital/Nursing Home/IP Rehab Facility: [facility]  Date of Admission: 05/19 & 06/09  Date of Discharge: 05/22 & 06/12  Reason(s) for Admission: Dizziness and low back pain     Was your hospitalization related to COVID-19? No   Problems taking medications regularly:  None  Medication changes since discharge: None  Problems adhering to non-medication therapy:  None    Summary of hospitalization:  [facility] discharge summary reviewed  Diagnostic Tests/Treatments reviewed.  Follow up needed: Needs follow-up BMP and follow-up with spine specialist  Other Healthcare Providers Involved in Patient s Care:           Update since discharge: improved.       Post Discharge Medication Reconciliation: discharge medications reconciled and changed, per note/orders.  Plan of care communicated with patient               Review of Systems   Constitutional, HEENT, cardiovascular, pulmonary, GI, , musculoskeletal, neuro, skin, endocrine and psych systems are negative, except as otherwise noted.      Objective    BP (!) 145/98   Pulse 118   Ht 1.727 m (5' 8\")   Wt 91.4 kg (201 lb 6.4 oz)   SpO2 96%   BMI 30.62 kg/m    Body mass index is 30.62 kg/m .  Physical Exam   GENERAL: healthy, alert and no distress  NECK: no adenopathy, no asymmetry, masses, or scars and thyroid normal to palpation  RESP: lungs clear to auscultation - no rales, rhonchi or wheezes  CV: regular rate and rhythm, normal S1 S2, no S3 or S4, no murmur, click or rub, no peripheral edema and peripheral pulses strong  MS: no gross musculoskeletal defects noted, no edema    Results for orders placed or performed in visit on 06/20/22   CBC with platelets and differential     Status: Abnormal   Result Value Ref Range    WBC " Count 4.2 4.0 - 11.0 10e3/uL    RBC Count 4.37 (L) 4.40 - 5.90 10e6/uL    Hemoglobin 14.1 13.3 - 17.7 g/dL    Hematocrit 41.5 40.0 - 53.0 %    MCV 95 78 - 100 fL    MCH 32.3 26.5 - 33.0 pg    MCHC 34.0 31.5 - 36.5 g/dL    RDW 13.8 10.0 - 15.0 %    Platelet Count 344 150 - 450 10e3/uL    % Neutrophils 54 %    % Lymphocytes 27 %    % Monocytes 15 %    % Eosinophils 4 %    % Basophils 1 %    % Immature Granulocytes 1 %    Absolute Neutrophils 2.2 1.6 - 8.3 10e3/uL    Absolute Lymphocytes 1.1 0.8 - 5.3 10e3/uL    Absolute Monocytes 0.6 0.0 - 1.3 10e3/uL    Absolute Eosinophils 0.2 0.0 - 0.7 10e3/uL    Absolute Basophils 0.0 0.0 - 0.2 10e3/uL    Absolute Immature Granulocytes 0.0 <=0.4 10e3/uL   Glucose     Status: None   Result Value Ref Range    Glucose 144 mg/dL   CBC with platelets and differential     Status: Abnormal    Narrative    The following orders were created for panel order CBC with platelets and differential.  Procedure                               Abnormality         Status                     ---------                               -----------         ------                     CBC with platelets and d...[[id number]]  Abnormal            Final result                 Please view results for these tests on the individual orders.   [id number] - Urine Drug Confirmation Panel (Comprehensive)     Status: None (In process)    Narrative    The following orders were created for panel order [id number] - Urine Drug Confirmation Panel (Comprehensive).  Procedure                               Abnormality         Status                     ---------                               -----------         ------                     Urine Drug Confirmation ...[[id number]]                      In process                 Urine Creatinine for Kevin...[[id number]]                      In process                 Cannabinoids qualitative...[[id number]]                      In process                   Please view results for these tests on  the individual orders.                   .  ..  Answers for HPI/ROS submitted by the patient on 6/20/2022  If you checked off any problems, how difficult have these problems made it for you to do your work, take care of things at home, or get along with other people?: Somewhat difficult  PHQ9 TOTAL SCORE: 9

## 2022-06-21 LAB — HCV AB SERPL QL IA: NONREACTIVE

## 2022-06-22 LAB
OXYCODONE UR CFM-MCNC: 395 NG/ML
OXYCODONE/CREAT UR: 745 NG/MG {CREAT}

## 2022-06-30 DIAGNOSIS — R79.0 LOW MAGNESIUM LEVEL: ICD-10-CM

## 2022-07-01 RX ORDER — UREA 10 %
500 LOTION (ML) TOPICAL 2 TIMES DAILY
Qty: 180 TABLET | Refills: 1 | Status: SHIPPED | OUTPATIENT
Start: 2022-07-01 | End: 2023-01-27

## 2022-07-01 NOTE — TELEPHONE ENCOUNTER
Former patient of Yoseph & has not established care with another provider.  Please assign refill request to covering provider per clinic standard process.    Routing refill request to provider for review/approval because:  Drug not on the [facility] refill protocol   Early refill requested.  No PCP    Last Written Prescription Date:  6/20/22  Last Fill Quantity: 30,  # refills: 1   Last office visit provider:  6/20/22     Requested Prescriptions   Pending Prescriptions Disp Refills     magnesium gluconate (MAGONATE) 500 (27 Mg) MG tablet 180 tablet 1     Sig: Take 1 tablet (500 mg) by mouth 2 times daily       There is no refill protocol information for this order          Karthikeyan Ruiz RN 07/01/22 9:55 AM

## 2022-07-05 ENCOUNTER — OFFICE VISIT (OUTPATIENT)
Dept: FAMILY MEDICINE | Facility: CLINIC | Age: 72
End: 2022-07-05
Payer: MEDICARE

## 2022-07-05 VITALS
OXYGEN SATURATION: 99 % | DIASTOLIC BLOOD PRESSURE: 73 MMHG | HEIGHT: 68 IN | HEART RATE: 118 BPM | BODY MASS INDEX: 30.46 KG/M2 | SYSTOLIC BLOOD PRESSURE: 128 MMHG | WEIGHT: 201 LBS

## 2022-07-05 DIAGNOSIS — Z23 ENCOUNTER FOR IMMUNIZATION: ICD-10-CM

## 2022-07-05 DIAGNOSIS — M20.11 HALLUX VALGUS, ACQUIRED, RIGHT: ICD-10-CM

## 2022-07-05 DIAGNOSIS — F10.20 ALCOHOL USE DISORDER, SEVERE, DEPENDENCE (H): ICD-10-CM

## 2022-07-05 DIAGNOSIS — F17.210 CIGARETTE NICOTINE DEPENDENCE WITHOUT COMPLICATION: ICD-10-CM

## 2022-07-05 DIAGNOSIS — G89.4 CHRONIC PAIN SYNDROME: ICD-10-CM

## 2022-07-05 DIAGNOSIS — R25.1 TREMULOUSNESS: ICD-10-CM

## 2022-07-05 DIAGNOSIS — R42 DIZZINESS: ICD-10-CM

## 2022-07-05 DIAGNOSIS — E11.9 DIABETES MELLITUS, NEW ONSET (H): Primary | ICD-10-CM

## 2022-07-05 PROBLEM — R94.31 ABNORMAL ELECTROCARDIOGRAM: Status: RESOLVED | Noted: 2017-02-10 | Resolved: 2022-07-05

## 2022-07-05 PROBLEM — K21.9 GASTRO-ESOPHAGEAL REFLUX DISEASE WITHOUT ESOPHAGITIS: Status: RESOLVED | Noted: 2017-02-10 | Resolved: 2022-07-05

## 2022-07-05 PROCEDURE — G0009 ADMIN PNEUMOCOCCAL VACCINE: HCPCS | Performed by: FAMILY MEDICINE

## 2022-07-05 PROCEDURE — 90677 PCV20 VACCINE IM: CPT | Performed by: FAMILY MEDICINE

## 2022-07-05 PROCEDURE — 99213 OFFICE O/P EST LOW 20 MIN: CPT | Mod: 25 | Performed by: FAMILY MEDICINE

## 2022-07-05 RX ORDER — LISINOPRIL 10 MG/1
TABLET ORAL
COMMUNITY
Start: 2022-06-20 | End: 2023-01-27

## 2022-07-05 RX ORDER — CYCLOBENZAPRINE HCL 5 MG
5 TABLET ORAL 3 TIMES DAILY PRN
Qty: 30 TABLET | Refills: 1 | Status: ON HOLD | OUTPATIENT
Start: 2022-07-05 | End: 2022-07-21

## 2022-07-05 ASSESSMENT — ANXIETY QUESTIONNAIRES
5. BEING SO RESTLESS THAT IT IS HARD TO SIT STILL: SEVERAL DAYS
GAD7 TOTAL SCORE: 9
4. TROUBLE RELAXING: MORE THAN HALF THE DAYS
8. IF YOU CHECKED OFF ANY PROBLEMS, HOW DIFFICULT HAVE THESE MADE IT FOR YOU TO DO YOUR WORK, TAKE CARE OF THINGS AT HOME, OR GET ALONG WITH OTHER PEOPLE?: SOMEWHAT DIFFICULT
3. WORRYING TOO MUCH ABOUT DIFFERENT THINGS: MORE THAN HALF THE DAYS
7. FEELING AFRAID AS IF SOMETHING AWFUL MIGHT HAPPEN: NOT AT ALL
1. FEELING NERVOUS, ANXIOUS, OR ON EDGE: NEARLY EVERY DAY
6. BECOMING EASILY ANNOYED OR IRRITABLE: NOT AT ALL
2. NOT BEING ABLE TO STOP OR CONTROL WORRYING: SEVERAL DAYS
7. FEELING AFRAID AS IF SOMETHING AWFUL MIGHT HAPPEN: NOT AT ALL

## 2022-07-05 ASSESSMENT — PATIENT HEALTH QUESTIONNAIRE - PHQ9
10. IF YOU CHECKED OFF ANY PROBLEMS, HOW DIFFICULT HAVE THESE PROBLEMS MADE IT FOR YOU TO DO YOUR WORK, TAKE CARE OF THINGS AT HOME, OR GET ALONG WITH OTHER PEOPLE: VERY DIFFICULT
SUM OF ALL RESPONSES TO PHQ QUESTIONS 1-9: 15
SUM OF ALL RESPONSES TO PHQ QUESTIONS 1-9: 15

## 2022-07-05 NOTE — PROGRESS NOTES
"  Assessment & Plan     ICD-10-CM    1. Diabetes mellitus, new onset (H)  E11.9 Orthopedic  Referral   2. Chronic pain syndrome  G89.4 cyclobenzaprine (FLEXERIL) 5 MG tablet   3. Cigarette nicotine dependence without complication  F17.210 Pneumococcal 20 Valent Conjugate (PCV20)   4. Dizziness  R42 Adult Neurology  Referral   5. Encounter for immunization   Z23 Pneumococcal 20 Valent Conjugate (PCV20)   6. Alcohol use disorder, severe, dependence (H)  F10.20    7. Tremulousness  R25.1    8. Hallux valgus, acquired, right  M20.11      Medical decision making: Patient with alcohol use disorder presents today for follow-up.  He continues to feel very dizzy.  He explains its from head up.  He is wondering if this could be from his previous back surgeries.  Discussed spinal nerve disease versus generalized neuropathic disorders.  I do believe his dizziness is from his chronic hyponatremia although last sodium levels were better.  He has cut down on drinking.  Today's visit was little bit nonconclusive as he did not know if he has started his medication specially naltrexone.  However, he does mention that he has not had any drinks since last Thursday.  He does continue to be tremulous.  A referral to neurology is done today noting his tremulousness and dizziness.  For his chronic pain, limited Flexeril dispensed.  Referral to podiatry noting rima chaudhari.  }     BMI:   Estimated body mass index is 30.56 kg/m  as calculated from the following:    Height as of this encounter: 1.727 m (5' 8\").    Weight as of this encounter: 91.2 kg (201 lb).       Depression Screening Follow Up    PHQ 7/5/2022   PHQ-9 Total Score 15   Q9: Thoughts of better off dead/self-harm past 2 weeks Not at all     Last PHQ-9 7/5/2022   1.  Little interest or pleasure in doing things 2   2.  Feeling down, depressed, or hopeless 2   3.  Trouble falling or staying asleep, or sleeping too much 3   4.  Feeling tired or having little " "energy 3   5.  Poor appetite or overeating 1   6.  Feeling bad about yourself 2   7.  Trouble concentrating 2   8.  Moving slowly or restless 0   Q9: Thoughts of better off dead/self-harm past 2 weeks 0   PHQ-9 Total Score 15       Follow Up Actions Taken  Patient counseled, no additional follow up at this time.     MEDICATIONS:  Continue current medications without change    Return in about 6 weeks (around 8/16/2022) for Follow up, with me.    Bereket Chino MD  [facility]    Maddie Man is a 71 year old accompanied by his N/A, presenting for the following health issues:  Follow Up (Pt stated he is very dizzy, and feels like a \"clamp\" is on his head. Hasn't fallen yet but close. Pt mentioned he forgot to bring his pills in today)      History of Present Illness       Back Pain:  He presents for follow up of back pain. Patient's back pain is a chronic problem.  Location of back pain:  Left lower back and left buttock  Description of back pain: dull ache and gnawing  Back pain spreads: left buttocks, left thigh, right foot and left foot    Since patient first noticed back pain, pain is: gradually worsening  Does back pain interfere with his job:  Yes      Mental Health Follow-up:  Patient presents to follow-up on Depression & Anxiety.Patient's depression since last visit has been:  Medium  The patient is having other symptoms associated with depression.  Patient's anxiety since last visit has been:  Medium  The patient is having other symptoms associated with anxiety.  Any significant life events: housing concerns, grief or loss and health concerns  Patient is feeling anxious or having panic attacks.  Patient has no concerns about alcohol or drug use.    Diabetes:   He presents for follow up of diabetes.  He is checking home blood glucose a few times a month. He checks blood glucose before meals.  Blood glucose is never over 200 and never under 70. He is aware of hypoglycemia " symptoms including shakiness, dizziness and weakness. He has no concerns regarding his diabetes at this time.  He is having numbness in feet and weight loss. The patient has had a diabetic eye exam in the last 12 months. Eye exam performed on Sep 21. Location of last eye exam [facility] [place]        Hypertension: He presents for follow up of hypertension.  He does not check blood pressure  regularly outside of the clinic. Outpatient blood pressures have not been over 140/90. He does not follow a low salt diet.      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)     HTN (hypertension)     Insomnia     Nicotine dependence, uncomplicated     Obesity (BMI 30-39.9)     Type 2 diabetes mellitus (H)     Hyponatremia     Syncope, unspecified syncope type     Urinary retention     Gastrointestinal hemorrhage, unspecified gastrointestinal hemorrhage type     Hypomagnesemia     Lightheadedness     Acute cystitis without hematuria     Aspiration pneumonia (H)     Confusion     Constipation     Debility     Diabetes mellitus, new onset (H)     Prolonged Q-T interval on ECG     Vitamin D deficiency     Tobacco use disorder     Toxic encephalopathy     Tubulovillous adenoma of colon     Spinal stenosis, unspecified region other than cervical     Low back pain     Gout     Dyslipidemia     Depression     Current Outpatient Medications   Medication     aspirin 81 MG EC tablet     atorvastatin (LIPITOR) 40 MG tablet     cyclobenzaprine (FLEXERIL) 5 MG tablet     DULoxetine (CYMBALTA) 60 MG capsule      "insulin glargine (LANTUS; BASAGLAR) 100 unit/mL (3 mL) pen     lisinopril (ZESTRIL) 10 MG tablet     magnesium gluconate (MAGONATE) 500 (27 Mg) MG tablet     naltrexone (DEPADE/REVIA) 50 MG tablet     thiamine (B-1) 100 MG tablet     albuterol (PROAIR HFA/PROVENTIL HFA/VENTOLIN HFA) 108 (90 Base) MCG/ACT inhaler     Lidocaine (LIDOCARE) 4 % Patch     No current facility-administered medications for this visit.         Review of Systems   Constitutional, HEENT, cardiovascular, pulmonary, gi and gu systems are negative, except as otherwise noted.      Objective    /73 (BP Location: Left arm, Patient Position: Sitting, Cuff Size: Adult Large)   Pulse 118   Ht 1.727 m (5' 8\")   Wt 91.2 kg (201 lb)   SpO2 99%   BMI 30.56 kg/m    Body mass index is 30.56 kg/m .  Physical Exam   GENERAL: healthy, alert and no distress  CV: regular rates and rhythm  ABDOMEN: soft, nontender, no hepatosplenomegaly, no masses and bowel sounds normal  Diabetic foot exam: DP reduced bilateral and dry cracking heels    Office Visit on 06/20/2022   Component Date Value Ref Range Status     Cholesterol 06/20/2022 182  <=199 mg/dL Final     Triglycerides 06/20/2022 198 (A) <=149 mg/dL Final     Direct Measure HDL 06/20/2022 61  >=40 mg/dL Final    HDL Cholesterol Reference Range:     0-2 years:   No reference ranges established for patients under 2 years old  [facility] lipid analytes.    2-8 years:  Greater than 45 mg/dL     18 years and older:   Female: Greater than or equal to 50 mg/dL   Male:   Greater than or equal to 40 mg/dL     LDL Cholesterol Calculated 06/20/2022 81  <=129 mg/dL Final     Patient Fasting > 8hrs? 06/20/2022 No   Final     Hepatitis C Antibody 06/20/2022 Nonreactive  Nonreactive Final     Lipase 06/20/2022 48  0 - 52 U/L Final     Sodium 06/20/2022 135 (A) 136 - 145 mmol/L Final     Potassium 06/20/2022 5.1 (A) 3.5 - 5.0 mmol/L Final     Chloride 06/20/2022 98  98 - 107 mmol/L Final     Carbon " Dioxide (CO2) 06/20/2022 25  22 - 31 mmol/L Final     Anion Gap 06/20/2022 12  5 - 18 mmol/L Final     Urea Nitrogen 06/20/2022 14  8 - 28 mg/dL Final     Creatinine 06/20/2022 1.00  0.70 - 1.30 mg/dL Final     Calcium 06/20/2022 10.3  8.5 - 10.5 mg/dL Final     Glucose 06/20/2022 142 (A) 70 - 125 mg/dL Final     Alkaline Phosphatase 06/20/2022 75  45 - 120 U/L Final     AST 06/20/2022 17  0 - 40 U/L Final     ALT 06/20/2022 14  0 - 45 U/L Final     Protein Total 06/20/2022 7.3  6.0 - 8.0 g/dL Final     Albumin 06/20/2022 4.0  3.5 - 5.0 g/dL Final     Bilirubin Total 06/20/2022 0.5  0.0 - 1.0 mg/dL Final     GFR Estimate 06/20/2022 81  >60 mL/min/1.73m2 Final    Effective December 21, 2021 eGFRcr in adults is calculated using the 2021 CKD-EPI creatinine equation which includes age and gender (Jacqueline et al., [place], DOI: 10.1056/BUIAdb8199481)     Vitamin B12 06/20/2022 505  213 - 816 pg/mL Final     Magnesium 06/20/2022 1.7 (A) 1.8 - 2.6 mg/dL Final     WBC Count 06/20/2022 4.2  4.0 - 11.0 10e3/uL Final     RBC Count 06/20/2022 4.37 (A) 4.40 - 5.90 10e6/uL Final     Hemoglobin 06/20/2022 14.1  13.3 - 17.7 g/dL Final     Hematocrit 06/20/2022 41.5  40.0 - 53.0 % Final     MCV 06/20/2022 95  78 - 100 fL Final     MCH 06/20/2022 32.3  26.5 - 33.0 pg Final     MCHC 06/20/2022 34.0  31.5 - 36.5 g/dL Final     RDW 06/20/2022 13.8  10.0 - 15.0 % Final     Platelet Count 06/20/2022 344  150 - 450 10e3/uL Final     % Neutrophils 06/20/2022 54  % Final     % Lymphocytes 06/20/2022 27  % Final     % Monocytes 06/20/2022 15  % Final     % Eosinophils 06/20/2022 4  % Final     % Basophils 06/20/2022 1  % Final     % Immature Granulocytes 06/20/2022 1  % Final     Absolute Neutrophils 06/20/2022 2.2  1.6 - 8.3 10e3/uL Final     Absolute Lymphocytes 06/20/2022 1.1  0.8 - 5.3 10e3/uL Final     Absolute Monocytes 06/20/2022 0.6  0.0 - 1.3 10e3/uL Final     Absolute Eosinophils 06/20/2022 0.2  0.0 - 0.7 10e3/uL Final     Absolute  Basophils 06/20/2022 0.0  0.0 - 0.2 10e3/uL Final     Absolute Immature Granulocytes 06/20/2022 0.0  <=0.4 10e3/uL Final     Microalbumin Urine mg/dL 06/20/2022 5.26 (A) 0.00 - 1.99 mg/dL Final     Creatinine Urine mg/dL 06/20/2022 52  mg/dL Final     Microalbumin Urine mg/g Cr 06/20/2022 101.2 (A) <=19.9 mg/g Cr Final     Oxycodone ng/mL 06/20/2022 395 (A) <50 ng/mL Final     Oxycodone 06/20/2022 745  Absent ng/mg [creat] Final    Sources of oxycodone are scheduled prescription medications.     Creatinine Urine for Drug Screen 06/20/2022 53  mg/dL Final    The reference range has not been established for creatinine in random urines. The results should be integrated into the clinical context for interpretation.     Cannabinoids Urine 06/20/2022 Screen Negative  Screen Negative Final    Drug Screening Threshold     THC Metabolite           50 ng/mL     Screening results are to be used only for medical purposes.   Unconfirmed screening results must not be used for non-   medical purposes.     Creatinine Urine mg/dL 06/20/2022 52  mg/dL Final     Glucose 06/20/2022 144  mg/dL Final     Color Urine 06/20/2022 Yellow  Colorless, Straw, Light Yellow, Yellow Final     Appearance Urine 06/20/2022 Clear  Clear Final     Glucose Urine 06/20/2022 Negative  Negative mg/dL Final     Bilirubin Urine 06/20/2022 Negative  Negative Final     Ketones Urine 06/20/2022 Negative  Negative mg/dL Final     Specific Gravity Urine 06/20/2022 1.015  1.005 - 1.030 Final     Blood Urine 06/20/2022 Negative  Negative Final     pH Urine 06/20/2022 7.0  5.0 - 8.0 Final     Protein Albumin Urine 06/20/2022 Negative  Negative mg/dL Final     Urobilinogen Urine 06/20/2022 0.2  0.2, 1.0 E.U./dL Final     Nitrite Urine 06/20/2022 Negative  Negative Final     Leukocyte Esterase Urine 06/20/2022 Negative  Negative Final                   .  ..

## 2022-07-08 ENCOUNTER — OFFICE VISIT (OUTPATIENT)
Dept: NEUROSURGERY | Facility: CLINIC | Age: 72
End: 2022-07-08
Payer: MEDICARE

## 2022-07-08 VITALS
SYSTOLIC BLOOD PRESSURE: 99 MMHG | BODY MASS INDEX: 30.46 KG/M2 | HEIGHT: 68 IN | WEIGHT: 201 LBS | OXYGEN SATURATION: 96 % | DIASTOLIC BLOOD PRESSURE: 48 MMHG | HEART RATE: 116 BPM

## 2022-07-08 DIAGNOSIS — M48.062 LUMBAR STENOSIS WITH NEUROGENIC CLAUDICATION: Primary | ICD-10-CM

## 2022-07-08 PROCEDURE — 99204 OFFICE O/P NEW MOD 45 MIN: CPT | Performed by: NEUROLOGICAL SURGERY

## 2022-07-08 NOTE — NURSING NOTE
Neurosurgery consultation was requested by: Self referral   Pain: low back pain   Radicular Pain is present: left buttock   Lhermitte sign: no   Motor complaints: weakness in both legs   Sensory complaints: numbness in buttock and bilateral legs and states his penis is numb.   Gait and balance issues: yes   Bowel or bladder issues: yes, bladder and bowel   Duration of SX is: years   The symptoms are worse with: constant   The symptoms are better with: nothing   Injury: yes   Severity is: chronic   Patient has tried the following conservative measures: yes and did not help.   TRES score is:  JOSSELYN Concepcion

## 2022-07-08 NOTE — PROGRESS NOTES
Mr. Jane is a 71-year-old man who was undergone multiple prior lumbar procedures and is seen today for primary complaint of bilateral lower extremity neurogenic claudication, left greater than right.  He was recently hospitalized for this problem and underwent an updated lumbar MRI scan which showed high-grade lumbar spinal stenosis at L2-3 and L3-4.  He reports his primary symptom is pain across his buttocks extending down the posterior aspect of his legs particularly on the left side this associated with increasing pain with walking or standing.  He obtains relief with sitting down or bending forward.  This has produced substantial functional barrier for him and is growing worse.    He has undergone multiple prior lumbar decompressive procedures as well as a lumbar fusion at the L5-S1 level.  He does complain of mechanical low back pain but this appears to be a secondary concern.    On examination today he is awake alert and oriented x3 with memory intact for recent and remote events and speech clear in character and content.  There are no focal cranial nerve abnormalities identified.  There are no meningeal signs present.  Cervical and lumbar range of motion appear appropriate for age and condition.  He is able to arise from a seated to a standing position without difficulty or pain behavior.  Gait is slightly broad-based but nonantalgic.  There is no evidence of overt ataxia.  He has 5/5 strength with dorsiflexion at the ankles while weightbearing.  He has 4/5 strength with plantar flexion at the ankles bilaterally while weightbearing.  Proximal bilateral lower extremities strength appears to be intact and symmetric.  There is no evidence of dermatomal sensory loss.  Deep tendon reflexes are diminished throughout his lower extremities.    His recent lumbar MRI scan obtained with and without contrast on June 9, 2022 is reviewed.  There is evidence of advanced multilevel to degenerative lumbar disc changes with  near complete collapse of the disc space at L1-2 with an anterior angulation at that level.  Most significant abnormalities in terms of spinal canal however appear at L2-3 and L3-4 where significant facet arthropathy and ligamentum flavum thickening produces moderately severe spinal canal stenosis and severe bilateral lateral recess stenosis.  Neural foramen at these levels are slightly narrowed without evidence of overt neural impingement.    Assessment: Symptomatic multilevel lumbar stenosis in this patient with functionally limiting neurogenic claudication refractory to prolonged conservative care over the past several years.  I have reviewed the clinical and radiographic findings with him and recommended two-level minimally invasive lumbar decompression at L2-3 and L3-4 using a right-sided approach although there is not a strong preference.  I spoke with him at length regarding the differential diagnosis for this problem and management alternatives including both surgical and nonsurgical means of management.  I explained the procedure to him in detail and told him that the risks of the procedure include failure to improve his symptoms, increased pain weakness or numbness, injury to the nerve roots or dural covering, infection, bleeding, instability, etc.  He appeared to understand this discussion, asked appropriate questions which I answered and wished to proceed with surgery as soon as practical.    I anticipate this procedure will be performed on an outpatient basis.  Approximately 35 minutes in total was spent reviewing the record and radiographic findings as well as counseling Mr. Jane regarding management alternatives, risks and benefits.

## 2022-07-08 NOTE — LETTER
7/8/2022         RE: Donovan Jane  [address] [facility] [address]        Dear Colleague,    Thank you for referring your patient, Donovan Jane, to the [facility]. Please see a copy of my visit note below.    Mr. Jane is a 71-year-old man who was undergone multiple prior lumbar procedures and is seen today for primary complaint of bilateral lower extremity neurogenic claudication, left greater than right.  He was recently hospitalized for this problem and underwent an updated lumbar MRI scan which showed high-grade lumbar spinal stenosis at L2-3 and L3-4.  He reports his primary symptom is pain across his buttocks extending down the posterior aspect of his legs particularly on the left side this associated with increasing pain with walking or standing.  He obtains relief with sitting down or bending forward.  This has produced substantial functional barrier for him and is growing worse.    He has undergone multiple prior lumbar decompressive procedures as well as a lumbar fusion at the L5-S1 level.  He does complain of mechanical low back pain but this appears to be a secondary concern.    On examination today he is awake alert and oriented x3 with memory intact for recent and remote events and speech clear in character and content.  There are no focal cranial nerve abnormalities identified.  There are no meningeal signs present.  Cervical and lumbar range of motion appear appropriate for age and condition.  He is able to arise from a seated to a standing position without difficulty or pain behavior.  Gait is slightly broad-based but nonantalgic.  There is no evidence of overt ataxia.  He has 5/5 strength with dorsiflexion at the ankles while weightbearing.  He has 4/5 strength with plantar flexion at the ankles bilaterally while weightbearing.  Proximal bilateral lower extremities strength appears to be intact and symmetric.  There is no evidence of dermatomal sensory  loss.  Deep tendon reflexes are diminished throughout his lower extremities.    His recent lumbar MRI scan obtained with and without contrast on June 9, 2022 is reviewed.  There is evidence of advanced multilevel to degenerative lumbar disc changes with near complete collapse of the disc space at L1-2 with an anterior angulation at that level.  Most significant abnormalities in terms of spinal canal however appear at L2-3 and L3-4 where significant facet arthropathy and ligamentum flavum thickening produces moderately severe spinal canal stenosis and severe bilateral lateral recess stenosis.  Neural foramen at these levels are slightly narrowed without evidence of overt neural impingement.    Assessment: Symptomatic multilevel lumbar stenosis in this patient with functionally limiting neurogenic claudication refractory to prolonged conservative care over the past several years.  I have reviewed the clinical and radiographic findings with him and recommended two-level minimally invasive lumbar decompression at L2-3 and L3-4 using a right-sided approach although there is not a strong preference.  I spoke with him at length regarding the differential diagnosis for this problem and management alternatives including both surgical and nonsurgical means of management.  I explained the procedure to him in detail and told him that the risks of the procedure include failure to improve his symptoms, increased pain weakness or numbness, injury to the nerve roots or dural covering, infection, bleeding, instability, etc.  He appeared to understand this discussion, asked appropriate questions which I answered and wished to proceed with surgery as soon as practical.    I anticipate this procedure will be performed on an outpatient basis.  Approximately 35 minutes in total was spent reviewing the record and radiographic findings as well as counseling Mr. Jane regarding management alternatives, risks and benefits.      Again, thank  you for allowing me to participate in the care of your patient.        Sincerely,        Kwaku Baker MD

## 2022-07-08 NOTE — PATIENT INSTRUCTIONS
Please review COMPLETE information about your proposed surgery, pre-operative requirements, post-operative course and expectations - available in a folder provided to you in clinic!    Your surgery scheduler will call you to begin the process of scheduling your surgery and appointments.     Pre-Operative    Pre-operative physical / Lab work with primary care physician within 30 days of surgical date. We will assist you in scheduling this.    If all pre-op appointments/test are not completed prior to your surgery date, you will be asked to reschedule your surgery.           As part of preparation for your upcoming procedure you are required to have a test for the novel Coronavirus/COVID-19.  The test needs to be completed within 4 days (96) hours of surgery.   We will assist you in scheduling this.   You may NOT receive the COVID-19 vaccine or booster 2 weeks before or after surgery.    Readiness Visits    Prior to surgery, you may have a telephone or in person readiness visit with our RN team to discuss your upcomming surgery, results of your pre-op physical, and lab work.   If you will require a collar/neck brace after surgery, you will be fitted for one at your readiness visit prior to surgery (scheduled by the surgery scheduler).     Shower procedure    Hibiclens shower: Use one packet the night before surgery and one packet the morning of surgery for a whole body shower. Avoid face, hair, and genitals.      Eating/Drinking    Stop all solid foods 8 hours before surgery.  Keep drinking clear liquids until 4 hours before surgery  Clear liquids include water, clear juice, black coffee, or clear tea without milk, Gatorade, clear soda.     Medications - please refer to the pre-operative medication instructions sheet in your folder    Hold Aspirin, NSAIDs (Advil/Ibuprofen, Indocin, Naproxen,Nuprin,Relafen/Nabumetone, Diclofenac,Meloxicam, Aleve, Celebrex) and all vitamins and supplements x 7-10 days prior to surgical  date  You can take Tylenol (Acetaminophen) for pain up until the date of your surgery   Do not exceed 3,000 mg per day   Any other medications prescribed, please discuss with your primary care provider at your pre-operative physical. Please discuss when/if it is safe for you to stop taking blood thinners with your primary care provider.   We will NOT provide pain medications prior to surgery. We will prescribe post-op pain medications for up to 6 weeks after surgery.       FMLA/Short-term disability    If you are currently employed, you will likely need to be off work for 4-6 weeks for post-op recovery and healing.  Please fax any FMLA/short term disability paperwork to [phone], mail it into the clinic, drop it off in person, or send via a [employer] message.   You may also call our clinic with the date in which you'd like to return to work, and we can provide a work letter to your employer  We will support Short-Term Disability up to 12 weeks, beginning the date of your surgery. We do NOT support Long-Term Disability/Social Security Benefits.     Pain Management after surgery    Dealing with pain    As your body heals, you might feel a stabbing, burning, or aching pain. You may also have some numbness.  Everyone feels pain differently, we may ask you to rate your pain using a pain scale. This will let us know how much pain you feel.   Keep in mind that medicine won't take away all of your pain. It helps to try other ways to relax and ease pain.     Things to help with pain    After surgery, we will give you medicine for your pain. These medications work well, but they can make you drowsy, itchy, or sick to your stomach, and constipated. Try to take narcotics with food if they cause nausea.   For mild to moderate pain, you can take medication such as Tylenol or Ibuprofen. These can be used with narcotics and muscle relaxants. *If you have had a fusion: do NOT use NSAIDs for 6 months after surgery.   Do NOT drive  while taking narcotic pain medication  Do NOT drink alcohol while using narcotic pain medication  You can utilize ice as needed (no longer than 20 minutes at one time) you may apply this over your covered incision  Utilize heat for muscle spasms, do not apply heat over your incision  If a muscle relaxer is prescribed, please do NOT take it at the same time as your narcotic pain medication. Take them at least 90 minutes apart.   You may also use pain cream/patches on sore muscles. Do NOT apply these on your incision. Patches may be cut in 1/2 if needed.     *After your surgery, if you will be staying in-patient, a nursing team will be monitoring you closely throughout your stay and communicate your health status to your surgeon and other providers.  You will be seen by Advanced Practice Providers (e.g., nurse practitioners, clinic nurse specialists, and physician assistants) who will check on you regularly to assess the status of your surgical recovery.     Incision Care    Look at your incision site every day. You  may need a mirror, or family member to help you.   Do not submerge your incision in water such as pools, hot tubs, baths for at least 6 weeks or until incision is healed  You may get your incision wet in the shower. Allow water and soap to run over incision, and gently pat dry.   Remove the dressing the day after you are discharged from the hospital. Keep the incision clean and dry at all times. This may require several bandage changes.   Contact us right away if you have:   Fever over 101 degrees farenheit  Green or yellow drainage (pus) from your incision or increased bloody drainage   Redness, swelling, or warmth at your surgery site   Notify the clinic [phone].    Activity Restrictions    For the first 6 weeks, no lifting,pushing, or pulling > 5-10 pounds, no bending, twisting.  Use the stairs in moderation   Walking: Walking is the best way to start exercise after surgery. Take short frequent  walks. You may gradually increase the distance as tolerated. If you feel pain, decrease your activity, but DO NOT stop walking. Walking will help you regain strength.  Avoid prolonged positioning for longer than 30 minutes (change positions frequently while awake)  No contact sports until after follow up visit  No high impact activities such as; running/jogging, snowmobile or 4 nunes riding or any other recreational vehicles until deemed safe by your surgeon/care team.   Please call the clinic if you develop any of the following symptoms:  Swelling and/or warmth in one or both legs  Pain or tenderness in your leg, ankle, foot, or arm   Red or discolored/pale skin     Post-Op Follow Up Appointments    We will call you to schedule these appointments after your discharge from the hospital.   Incision assessment within 2 weeks with a Registered Nurse   6 week post-op with a Nurse Practitioner/Physician Assistant. Your surgeon will be available on this day.

## 2022-07-11 ENCOUNTER — TELEPHONE (OUTPATIENT)
Dept: NEUROSURGERY | Facility: CLINIC | Age: 72
End: 2022-07-11

## 2022-07-11 ENCOUNTER — LAB (OUTPATIENT)
Dept: LAB | Facility: CLINIC | Age: 72
End: 2022-07-11
Payer: MEDICARE

## 2022-07-11 ENCOUNTER — OFFICE VISIT (OUTPATIENT)
Dept: NEUROLOGY | Facility: CLINIC | Age: 72
End: 2022-07-11
Attending: FAMILY MEDICINE
Payer: MEDICARE

## 2022-07-11 VITALS — DIASTOLIC BLOOD PRESSURE: 96 MMHG | SYSTOLIC BLOOD PRESSURE: 156 MMHG | HEART RATE: 124 BPM

## 2022-07-11 DIAGNOSIS — R93.89 ABNORMAL FINDINGS ON DIAGNOSTIC IMAGING OF OTHER SPECIFIED BODY STRUCTURES: ICD-10-CM

## 2022-07-11 DIAGNOSIS — G62.9 NEUROPATHY: ICD-10-CM

## 2022-07-11 DIAGNOSIS — R42 DIZZINESS: ICD-10-CM

## 2022-07-11 DIAGNOSIS — G62.9 NEUROPATHY: Primary | ICD-10-CM

## 2022-07-11 LAB
TOTAL PROTEIN SERUM FOR ELP: 6.8 G/DL (ref 6.4–8.3)
TSH SERPL DL<=0.005 MIU/L-ACNC: 1.68 UIU/ML (ref 0.3–5)

## 2022-07-11 PROCEDURE — 84425 ASSAY OF VITAMIN B-1: CPT

## 2022-07-11 PROCEDURE — 99205 OFFICE O/P NEW HI 60 MIN: CPT | Performed by: PSYCHIATRY & NEUROLOGY

## 2022-07-11 PROCEDURE — 36415 COLL VENOUS BLD VENIPUNCTURE: CPT

## 2022-07-11 PROCEDURE — 84155 ASSAY OF PROTEIN SERUM: CPT

## 2022-07-11 PROCEDURE — 84443 ASSAY THYROID STIM HORMONE: CPT

## 2022-07-11 PROCEDURE — 86334 IMMUNOFIX E-PHORESIS SERUM: CPT | Performed by: PATHOLOGY

## 2022-07-11 PROCEDURE — 84165 PROTEIN E-PHORESIS SERUM: CPT | Mod: TC | Performed by: PATHOLOGY

## 2022-07-11 RX ORDER — LANOLIN ALCOHOL/MO/W.PET/CERES
100 CREAM (GRAM) TOPICAL DAILY
COMMUNITY
End: 2023-01-27

## 2022-07-11 RX ORDER — LANOLIN ALCOHOL/MO/W.PET/CERES
1000 CREAM (GRAM) TOPICAL DAILY
COMMUNITY
End: 2023-01-27

## 2022-07-11 NOTE — NURSING NOTE
"Chief Complaint   Patient presents with     Consult     Dizziness  Fogginess  Band of \"pressure\" around his head       "

## 2022-07-11 NOTE — PROGRESS NOTES
"[facility] Neurology Consultation    Donovan Jane MRN# [id number]   Age: 71 year old YOB: 1950     Requesting physician: Smitha Lopez     Reason for Consultation: dizziness      History of Presenting Symptoms:   Donovan Jane is a 71 year old male who presents today for evaluation of dizziness.  The patient has a pertinent medical history of HTN, DMII, alcohol dependence w/ induced pancreatitis, and lumbar spinal stenosis (L2-4) s/p decompression (L1-2, L4-5, L5-S1) and fusion at L5-S1.    The patient was found to have SIADH on a recent admission for dizziness, and lower back pain (6/9/2022-6/12/2022, [facility]).  This admission was reviewed today. MRI brain was without pertinent findings of acute infarction or brainstem injury.  B12 was normal. His hyponatremia improved with water restriction recommendation.  His alcoholism was thought to play a role in his sodium related dysfunction, but the patient was going to try to wean off alcohol use himself over attending detox.  MRI lumbar spine did show severe lumbar stenosis as well as likely polyradiculopathy from neuro foraminal-stenosis, and he was seen with Neurosurgery and followed up with them as an outpatient 7/8/2022.    Today, the patient has had a feeling of unsteadiness while walking.  He may also have a feeling of pre-syncope leading to passing out with standing.  He has no major symptoms when sitting down.  His primary concern of dizziness occurs when standing.      When sitting, he reports that his symptoms are that of \"fogginess of the brain\" which is actually present at all times.  He describes he feels like there is a clamp over his fore-head and eyes.  Sometimes this is a headache, but not often. He doesn't feel like this is worsening his mentation, but is more-or-less a pressure sensation.    When standing, he can get woozy and have a change in vision.  He describes that his vision dims, and can go out at times (goes " black). He doesn't often get nausea, vomiting, and room-spinning with this event. He has passed out 3 times from this, but has now used techniques to avoid passing out (sits down prior, takes his time to stand).    When walking, or after standing for some-time, he can have a feeling of unsteadiness.  It makes him anxious.  He is fearful of falling.  He has issues with standing in the shower leading to more imbalance. He is more careful around stairs and curbs.       Social History:   He has had a withdraw seizure from alcohol use in the past.  He has reduced his alcohol use 1-2 beers a day (12oz).        Medications:   ASA  Atorvastatin  Flexeril  Duloxetine  Insulin  Lisinopril  Naltrexone     Physical Exam:   Vitals: BP (!) 156/96 (BP Location: Right arm, Patient Position: Sitting, Cuff Size: Adult Regular)   Pulse (!) 124    General: Seated comfortably in no acute distress.  HEENT: Neck supple with normal range of motion. No paracervical muscle tenderness or tightness.    Neurologic:     Mental Status: Fully alert, attentive and oriented. Speech clear and fluent, no paraphasic errors.      Cranial Nerves: Visual fields intact. PERRL. EOMI with normal smooth pursuit. Facial sensation intact/symmetric. Facial movements symmetric. Hearing not formally tested but intact to conversation. Palate elevation symmetric, uvula midline. No dysarthria. Shoulder shrug strong bilaterally. Tongue protrusion midline.     Motor: Postural and action tremor with arms, and hands (5-7 Hz, moderate amplitude, can be overcome with relaxation, worse with movements of hands). Muscle tone normal throughout. No pronator drift. Normal/symmetric rapid finger tapping. Strength 5/5 throughout upper and lower extremities except for plantar-flexion on left being 4-/5.   Deep Tendon Reflexes: 2+/symmetric throughout upper extremities. Patellar 1+ b/l,. Achilles absent b/l. No clonus. Toes downgoing bilaterally.     Sensory: Intact/symmetric to  light touch, pinprick throughout (some minor patchy loss over interspace between left great toe and 1st toe). Absent vibration at toes, MM, and limtied at mid-leg (4-8 seconds at varied spaces).  Proprioception intact at great toes (4/5 on each toe). Positive Romberg.      Coordination: Finger-nose-finger with ataxia, Rapid alternating movements intact/symmetric with normal speed and rhythm.     Gait: Stands smoothly with arms across chest, slight wince with upright posture (low back pain). Left toe touch with stride (odd, not slap, harder heel strike). Turns easily. Tandem is with cross over, falls to either side even with just postural stance.  Weaker with standing on toes (L>R). Can stand on heels.           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  His imbalance and syncope is most likely related to a polyneuropathy for which etiologies are likely to be alcohol use, and diabetes. I would ask he obtain a few further labs to look for alternative etiologies beyond these likely causes.  He is already using a cane sparingly, and we discussed that continued use is likely a better option to avoid falls given his imbalance.  He is already using therapies to avoid falls with standing, but if these symptoms continue he may need to have/consider compression stockings or a compression girdle along with PT.    He had  weakness with plantarflexion on the left, but not dorsiflexion, indicative for more of an S1 or tibial neuropathy.  This may be old or new, but I think an EMG is reasonable given ongoing neuropathy and for the fact that he is set to have further back surgery in the near future.  If an acute injury is noted to a specific root level, this may help guide further surgical intervention as well as determining a good starting point to compare to post-operatively.    His tremor today could be related to some chronic alcohol use, as there was an element of ataxia and physiological tremor seen with movement.  I do wonder about his thyroid finding on 6/9/2022, as untreated thyroid conditions could worsen an already present tremor and lead to fluctuations as he describes today.  I will repeat the thyroid test today with reflex, and ask he discuss this further with his PCP if abnormal again.    Plan:  - EMG b/l lower  - SPEP, Immunofixation, TSH w/reflex    Follow up in Neurology clinic in 3-4 months, or should new concerns arise.    BRENT Marie D.O.   of Neurology    Total time today (75 min) in this patient encounter was spent on pre-charting, counseling and/or coordination of care.  The patient is in agreement with this plan and has no further questions.

## 2022-07-11 NOTE — PATIENT INSTRUCTIONS
I think you have a wide-spread neuropathy that is causing issues with balance, and with your blood pressure control when standing.  I don't know how much of the neuropathy is playing a role in your left lower extremity weakness, but would want to have an EMG to determine if this weakness is old or new, or related to neuropathy versus other nerve compression in the back.  Otherwise, I think a few lab tests can be done to look for other causes beyond diabetes and alcohol use for your neuropathy.    - EMG b/l lower  - SPEP, Immunofixation, TSH w/reflex, B1

## 2022-07-11 NOTE — TELEPHONE ENCOUNTER
ORDER FROM: Dr. Baker    PRE AUTHORIZATION: PA PENDING    METHOD OF PATIENT CONTACT: Spoke with patient over the phone #[phone]    PROCEDURE: minimally invasive lumbar decompression at lumbar 2 to lumbar three and lumbar three to lumbar four using a right-sided approach    SURGICAL DATE: July 21, 2022 at [facility] at 7:20am    COVID TEST:  1 to 2 days prior to surgery    READINESS VISIT: PLEASE CALL    PCP, CLINIC, PHONE#: Dr. [facility] #[phone]    FILM INFO: MRI Lumbar 06/09/2022 at [facility]    SURGICAL LETTER:Mailed on 07/11/2022

## 2022-07-11 NOTE — LETTER
"    7/11/2022         RE: Donovan aJne  [address] [address]        Dear Colleague,    Thank you for referring your patient, Donovan Jane, to the [facility]. Please see a copy of my visit note below.    [facility] Neurology Consultation    Donovan Jane MRN# [id number]   Age: 71 year old YOB: 1950     Requesting physician: Smitha Lopez     Reason for Consultation: dizziness      History of Presenting Symptoms:   Donovan Jane is a 71 year old male who presents today for evaluation of dizziness.  The patient has a pertinent medical history of HTN, DMII, alcohol dependence w/ induced pancreatitis, and lumbar spinal stenosis (L2-4) s/p decompression (L1-2, L4-5, L5-S1) and fusion at L5-S1.    The patient was found to have SIADH on a recent admission for dizziness, and lower back pain (6/9/2022-6/12/2022, [facility]).  This admission was reviewed today. MRI brain was without pertinent findings of acute infarction or brainstem injury.  B12 was normal. His hyponatremia improved with water restriction recommendation.  His alcoholism was thought to play a role in his sodium related dysfunction, but the patient was going to try to wean off alcohol use himself over attending detox.  MRI lumbar spine did show severe lumbar stenosis as well as likely polyradiculopathy from neuro foraminal-stenosis, and he was seen with Neurosurgery and followed up with them as an outpatient 7/8/2022.    Today, the patient has had a feeling of unsteadiness while walking.  He may also have a feeling of pre-syncope leading to passing out with standing.  He has no major symptoms when sitting down.  His primary concern of dizziness occurs when standing.      When sitting, he reports that his symptoms are that of \"fogginess of the brain\" which is actually present at all times.  He describes he feels like there is a clamp over his fore-head and eyes.  Sometimes this is a " headache, but not often. He doesn't feel like this is worsening his mentation, but is more-or-less a pressure sensation.    When standing, he can get woozy and have a change in vision.  He describes that his vision dims, and can go out at times (goes black). He doesn't often get nausea, vomiting, and room-spinning with this event. He has passed out 3 times from this, but has now used techniques to avoid passing out (sits down prior, takes his time to stand).    When walking, or after standing for some-time, he can have a feeling of unsteadiness.  It makes him anxious.  He is fearful of falling.  He has issues with standing in the shower leading to more imbalance. He is more careful around stairs and curbs.       Social History:   He has had a withdraw seizure from alcohol use in the past.  He has reduced his alcohol use 1-2 beers a day (12oz).        Medications:   ASA  Atorvastatin  Flexeril  Duloxetine  Insulin  Lisinopril  Naltrexone     Physical Exam:   Vitals: BP (!) 156/96 (BP Location: Right arm, Patient Position: Sitting, Cuff Size: Adult Regular)   Pulse (!) 124    General: Seated comfortably in no acute distress.  HEENT: Neck supple with normal range of motion. No paracervical muscle tenderness or tightness.    Neurologic:     Mental Status: Fully alert, attentive and oriented. Speech clear and fluent, no paraphasic errors.      Cranial Nerves: Visual fields intact. PERRL. EOMI with normal smooth pursuit. Facial sensation intact/symmetric. Facial movements symmetric. Hearing not formally tested but intact to conversation. Palate elevation symmetric, uvula midline. No dysarthria. Shoulder shrug strong bilaterally. Tongue protrusion midline.     Motor: Postural and action tremor with arms, and hands (5-7 Hz, moderate amplitude, can be overcome with relaxation, worse with movements of hands). Muscle tone normal throughout. No pronator drift. Normal/symmetric rapid finger tapping. Strength 5/5 throughout  upper and lower extremities except for plantar-flexion on left being 4-/5.   Deep Tendon Reflexes: 2+/symmetric throughout upper extremities. Patellar 1+ b/l,. Achilles absent b/l. No clonus. Toes downgoing bilaterally.     Sensory: Intact/symmetric to light touch, pinprick throughout (some minor patchy loss over interspace between left great toe and 1st toe). Absent vibration at toes, MM, and limtied at mid-leg (4-8 seconds at varied spaces).  Proprioception intact at great toes (4/5 on each toe). Positive Romberg.      Coordination: Finger-nose-finger with ataxia, Rapid alternating movements intact/symmetric with normal speed and rhythm.     Gait: Stands smoothly with arms across chest, slight wince with upright posture (low back pain). Left toe touch with stride (odd, not slap, harder heel strike). Turns easily. Tandem is with cross over, falls to either side even with just postural stance.  Weaker with standing on toes (L>R). Can stand on heels.           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  His imbalance and syncope is most likely related to a polyneuropathy for which etiologies are likely to be alcohol use, and diabetes. I would ask he obtain a few further labs to look for alternative etiologies beyond these likely causes.  He is already using a cane sparingly, and we discussed that continued use  is likely a better option to avoid falls given his imbalance.  He is already using therapies to avoid falls with standing, but if these symptoms continue he may need to have/consider compression stockings or a compression girdle along with PT.    He had weakness with plantarflexion on the left, but not dorsiflexion, indicative for more of an S1 or tibial neuropathy.  This may be old or new, but I think an EMG is reasonable given ongoing neuropathy and for the fact that he is set to have further back surgery in the near future.  If an acute injury is noted to a specific root level, this may help guide further surgical intervention as well as determining a good starting point to compare to post-operatively.    His tremor today could be related to some chronic alcohol use, as there was an element of ataxia and physiological tremor seen with movement.  I do wonder about his thyroid finding on 6/9/2022, as untreated thyroid conditions could worsen an already present tremor and lead to fluctuations as he describes today.  I will repeat the thyroid test today with reflex, and ask he discuss this further with his PCP if abnormal again.    Plan:  - EMG b/l lower  - SPEP, Immunofixation, TSH w/reflex    Follow up in Neurology clinic in 3-4 months, or should new concerns arise.    BRENT Marie D.O.   of Neurology    Total time today (75 min) in this patient encounter was spent on pre-charting, counseling and/or coordination of care.  The patient is in agreement with this plan and has no further questions.        Again, thank you for allowing me to participate in the care of your patient.        Sincerely,        Kwaku Marie, DO

## 2022-07-11 NOTE — LETTER
Dear Ashwini Buck,    This letter will help in preparation for your upcoming surgery. Please contact us with any additional questions you may have regarding your surgery. Contact information for your surgery scheduler:     Dr. Diamond: [phone] Fausto LlanesKailee    You are scheduled for: Lumbar Decompression L2-L3, L3-L4  With: Kwaku Baker MD  Date/Time: July 21, 2022 at 7:20am (time subject to change)  Location: [place] [address]    Check in at the Welcome Desk inside the main doors of the hospital. They will direct you to the surgery waiting area. Please arrive by 5:30am    In the event of an emergency surgery case, there may be an adjustment to your start time for surgery.     PREPARING FOR YOUR SURGERY    *Pre-op Physical: 07/14/2022 at 11am at [address] [place] [address]  Phone # [phone]    *Please discuss the necessity of receiving a pneumococcal vaccine prior to surgery at your pre-op physical. Recommended for all patients over the age of 65 or based on certain medical conditions.     *After the pre-op physical is complete, please have your clinic fax the visit note to our office at [phone].      *Covid-19 Pre-procedure Test: 1 to 2 days prior to your surgery date. Please take a picture of the negative test results and bring with you the day of surgery.      *Readiness Visit: 's nurse will call you prior to the day of surgery to go over the results of your pre-op physical/labs results/covid results, along with additional information you will need for the day of surgery.    *Ensure that you have completed your pre-op physical, along with any other necessary tests/appointments (listed above), prior to your Readiness Visit.                         ADDITIONAL INFORMATION REGARDING YOUR SURGERY    Medications    Bring a list ALL medications, including any over-the-counter vitamins and herbal supplements with you to the hospital on the  day of surgery.    DO NOT bring your medications with you the day of surgery.    Please see attached third sheet for more details on medications/vitamins/herbal supplements that should be discontinued prior to your surgery date.     If you are unsure if you should discontinue a medication/ vitamin/herbal supplement, please call our office and discuss with a nurse.    Continue taking your medications/vitamins/herbal supplements unless they are on the attached list.     Failure to follow the instructions regarding medications/vitamins/herbal supplements will result in cancellation of your surgery.    Day BEFORE Surgery    DO NOT shave near your surgical site. This can cause irritation of the skin    Using a washcloth and provided bottle of Hibiclens, shower the night BEFORE surgery, using a half bottle of Hibiclens to wash your body, avoiding face and genitals. The morning OF surgery, shower and use the second half of the bottle to wash your body, avoiding face and genitals. If you are unable to take a shower the morning of surgery, please discuss your options with the nurse at your readiness visit.     NOTHING to eat after 11:00 p.m. the night prior to surgery.    CLEAR LIQUIDS: May have the following liquids up to two (2) hours before your arrival time at the hospital: water, plain black coffee (no cream or milk), plain black tea or plain green tea (no cream or milk), Gatorade or Propel Water.    SMOKING: Stop smoking as far before surgery as possible, or as directed by your surgeon. NO tobacco products of any kind (cigarettes, e-cigarettes, chewing tobacco) beginning at 11:00 p.m. the night prior to your procedure.     ALCOHOL: You should stop drinking alcohol beginning at 11:00 p.m. the night prior to your procedure    Contact our office if you have symptoms of illness such as a fever of 101 or greater, chills, cough, sore throat, or if you develop a rash or any open sore    Day OF Surgery    If you ve been  instructed to take a medication(s) on the morning of surgery, please take with a very small sip of water.    Wear loose & comfortable clothing and flat shoes, Leave jewelry/valuables at home. If you wear contact lenses, remove them at home and wear glasses. Remove any body piercings. Remove nail polish.     Planning for Discharge    Start planning for your care after discharge as soon as you receive this letter.    If you have not made arrangements for someone to take you home and stay with you for the first 48 hours after discharge, your surgery may be cancelled.                        PRE-OPERATIVE MEDICATION INSTRUCTIONS    Review this information with your primary care physician prior to discontinuing any of the medications listed below.  Notify your primary care physician that you have been instructed to discontinue these medications.    TEN (10) Days Prior to Surgery, STOP the Following Medications   Margarita-Baker  Anacin  Aspirin  Excedrin  Pepto Bismol    **Before taking ANY over-the-counter medications, check the label for Aspirin   Non-steroidal   Anti-inflammatory Medications (NSAIDS)    Celebrex  Diclofenac (Cataflam)  Etodolac (Iodine)  Fenoprofen (Nalfon)  Ibuprofen (Advil, Motrin, Nuprin)  Indomethacin (Indocin)  Ketoprofen  Ketorolac (Toradol)  Meloxicam (Mobic)  Naproxen (AnaProx, Aleve, Naprosyn)  Relafen (Nabumetone)  Sulindac (Clinoril)   Herbal Supplements (this is a partial list of herbals to be discontinued)    Bipin Gibson  Ephedra  Feverfew  Fish Oil  Flaxseed Oil  Garlic  Rosalina  Gingko  Ginseng  Goldenseal  Imitrex (Sumatriptan)  Kava  Krill Oil  Licorice  Multi Vitamins  Adilia s Wort  Turmeric  Valerian  Vitamin E  Yohimbe   CHECK WITH YOUR PRESCRIBING DOCTOR BEFORE STOPPING ANY BLOOD THINNERS (approximately 7 days prior to surgery)  (Coumadin, Plavix, Eliquis, Xarelto, Pradaxa, Platel, Aggrenox, Effient (Prasugrel), Ticlid)      ALWAYS CHECK WITH YOUR PRESCRIBING DOCTOR REGARDING  THE MEDICATIONS LISTED BELOW; RECOMMENDED STOP TIME IS ALSO LISTED      If you are taking Lovenox, discontinue 24 HOURS prior to surgery    If you are taking weight loss medication, discontinue 7 days prior to surgery    If you are taking Metformin or Simvastatin, check with your primary care physician (or whoever has prescribed you this medication) regarding when to discontinue prior to surgery

## 2022-07-12 LAB
ALBUMIN SERPL ELPH-MCNC: 3.9 G/DL (ref 3.7–5.1)
ALPHA1 GLOB SERPL ELPH-MCNC: 0.4 G/DL (ref 0.2–0.4)
ALPHA2 GLOB SERPL ELPH-MCNC: 0.9 G/DL (ref 0.5–0.9)
B-GLOBULIN SERPL ELPH-MCNC: 0.8 G/DL (ref 0.6–1)
GAMMA GLOB SERPL ELPH-MCNC: 0.8 G/DL (ref 0.7–1.6)
M PROTEIN SERPL ELPH-MCNC: 0 G/DL
PROT PATTERN SERPL ELPH-IMP: NORMAL
PROT PATTERN SERPL IFE-IMP: NORMAL

## 2022-07-12 PROCEDURE — 86334 IMMUNOFIX E-PHORESIS SERUM: CPT

## 2022-07-12 PROCEDURE — 84165 PROTEIN E-PHORESIS SERUM: CPT | Mod: 26 | Performed by: PATHOLOGY

## 2022-07-13 ENCOUNTER — OFFICE VISIT (OUTPATIENT)
Dept: PODIATRY | Facility: CLINIC | Age: 72
End: 2022-07-13
Payer: MEDICARE

## 2022-07-13 VITALS
DIASTOLIC BLOOD PRESSURE: 88 MMHG | HEART RATE: 134 BPM | BODY MASS INDEX: 31.02 KG/M2 | OXYGEN SATURATION: 96 % | WEIGHT: 204 LBS | SYSTOLIC BLOOD PRESSURE: 127 MMHG

## 2022-07-13 DIAGNOSIS — E11.9 DIABETES MELLITUS, NEW ONSET (H): ICD-10-CM

## 2022-07-13 PROCEDURE — 99203 OFFICE O/P NEW LOW 30 MIN: CPT | Performed by: PODIATRIST

## 2022-07-13 ASSESSMENT — PAIN SCALES - GENERAL: PAINLEVEL: NO PAIN (0)

## 2022-07-13 NOTE — PROGRESS NOTES
"Assessment:      ICD-10-CM    1. Diabetes mellitus, new onset (H)  E11.9 Orthopedic  Referral          Plan:    Patient was schedule as \"new onset Diabetes and Hammertoe\"    Patient has no pain in his foot or ankle    He states he is here for \"foot rot\" - foot fungus and nail fungus    Fungus - PCP, dermatology    Rx DM shoes today, no need for annual foot exam unless complications arise        Sherie Robertson DPM                              Chief Complaint:     Patient presents with:  Left Foot - Foot Problems  Right Foot - Foot Problems       HPI:  Donovan Jane is a 71 year old year old male who presents for foot concern.        The patient is Diabetic.  Last HbA1C -   Hemoglobin A1C   Date Value Ref Range Status   05/20/2022 6.5 (H) <=5.6 % Final     Comment:       Prediabetes: 5.7 to 6.4%        Diabetes:  >=6.5%     Patients with Hgb F >5%, total bilirubin >10.0 mg/dL, abnormal red cell turnover, severe renal or hepatic disease or malignancy should not have this A1C method used to diagnose or monitor diabetes.    .        Donovan presents with bilateral foot numbness. He reports he is scheduled to have back surgery next Thursday July, 21, 2023. He has a bunion on Right foot that does not present any problem..      Past Medical & Surgical History:  No past medical history on file.   Past Surgical History:   Procedure Laterality Date     BACK SURGERY        No family history on file.     Social History:  ?  History   Smoking Status     Current Every Day Smoker     Packs/day: 1.50     Years: 15.00     Types: Cigarettes     Last attempt to quit: 7/23/2018   Smokeless Tobacco     Never Used     History   Drug Use     Types: Marijuana     Social History    Substance and Sexual Activity      Alcohol use: Yes      Allergies:  ?   Allergies   Allergen Reactions     Aspirin GI Disturbance        Medications:    Current Outpatient Medications   Medication     aspirin 81 MG EC tablet     atorvastatin (LIPITOR) " 40 MG tablet     cyanocobalamin (VITAMIN B-12) 1000 MCG tablet     cyclobenzaprine (FLEXERIL) 5 MG tablet     DULoxetine (CYMBALTA) 60 MG capsule     insulin glargine (LANTUS; BASAGLAR) 100 unit/mL (3 mL) pen     Lidocaine (LIDOCARE) 4 % Patch     lisinopril (ZESTRIL) 10 MG tablet     magnesium gluconate (MAGONATE) 500 (27 Mg) MG tablet     naltrexone (DEPADE/REVIA) 50 MG tablet     thiamine (B-1) 100 MG tablet     No current facility-administered medications for this visit.       Physical Exam:    Vitals:  [unfilled]  General:  WD/WN, in NAD.  A&O x3.  Dermatologic:  Skin is intact, open lesions absent.   Skin texture, turgor is abnormal, fubgus.  Vascular:  Pulses not palpable bilateral.  Digital capillary refill time normal bilateral.  Skin temperature is normal bilateral.  Generalized edema- trace bilateral.  Neurologic:    Gross sensation normal.  Gait and balance normal.  Musculoskeletal:  aROM digits, ankle intact.  Muscle strength intact to foot & ankle.  Deformity present - pes valgus R

## 2022-07-13 NOTE — LETTER
"    7/13/2022         RE: Donovan Jane  [address] [facility] [facility] [address]        Dear Colleague,    Thank you for referring your patient, Donovan Jane, to the [facility]. Please see a copy of my visit note below.    Assessment:      ICD-10-CM    1. Diabetes mellitus, new onset (H)  E11.9 Orthopedic  Referral          Plan:    Patient was schedule as \"new onset Diabetes and Hammertoe\"    Patient has no pain in his foot or ankle    He states he is here for \"foot rot\" - foot fungus and nail fungus    Fungus - PCP, dermatology    Rx DM shoes today, no need for annual foot exam unless complications arise        Sherie Robertson DPM                              Chief Complaint:     Patient presents with:  Left Foot - Foot Problems  Right Foot - Foot Problems       HPI:  Donovan Jane is a 71 year old year old male who presents for foot concern.        The patient is Diabetic.  Last HbA1C -   Hemoglobin A1C   Date Value Ref Range Status   05/20/2022 6.5 (H) <=5.6 % Final     Comment:       Prediabetes: 5.7 to 6.4%        Diabetes:  >=6.5%     Patients with Hgb F >5%, total bilirubin >10.0 mg/dL, abnormal red cell turnover, severe renal or hepatic disease or malignancy should not have this A1C method used to diagnose or monitor diabetes.    .        Donovan presents with bilateral foot numbness. He reports he is scheduled to have back surgery next Thursday July, 21, 2023. He has a bunion on Right foot that does not present any problem..      Past Medical & Surgical History:  No past medical history on file.   Past Surgical History:   Procedure Laterality Date     BACK SURGERY        No family history on file.     Social History:  ?  History   Smoking Status     Current Every Day Smoker     Packs/day: 1.50     Years: 15.00     Types: Cigarettes     Last attempt to quit: 7/23/2018   Smokeless Tobacco     Never Used     History   Drug Use     Types: Marijuana     Social History    Substance " and Sexual Activity      Alcohol use: Yes      Allergies:  ?   Allergies   Allergen Reactions     Aspirin GI Disturbance        Medications:    Current Outpatient Medications   Medication     aspirin 81 MG EC tablet     atorvastatin (LIPITOR) 40 MG tablet     cyanocobalamin (VITAMIN B-12) 1000 MCG tablet     cyclobenzaprine (FLEXERIL) 5 MG tablet     DULoxetine (CYMBALTA) 60 MG capsule     insulin glargine (LANTUS; BASAGLAR) 100 unit/mL (3 mL) pen     Lidocaine (LIDOCARE) 4 % Patch     lisinopril (ZESTRIL) 10 MG tablet     magnesium gluconate (MAGONATE) 500 (27 Mg) MG tablet     naltrexone (DEPADE/REVIA) 50 MG tablet     thiamine (B-1) 100 MG tablet     No current facility-administered medications for this visit.       Physical Exam:    Vitals:  [unfilled]  General:  WD/WN, in NAD.  A&O x3.  Dermatologic:  Skin is intact, open lesions absent.   Skin texture, turgor is abnormal, fubgus.  Vascular:  Pulses not palpable bilateral.  Digital capillary refill time normal bilateral.  Skin temperature is normal bilateral.  Generalized edema- trace bilateral.  Neurologic:    Gross sensation normal.  Gait and balance normal.  Musculoskeletal:  aROM digits, ankle intact.  Muscle strength intact to foot & ankle.  Deformity present - pes valgus R              Again, thank you for allowing me to participate in the care of your patient.        Sincerely,        Sherie Robertson DPM

## 2022-07-13 NOTE — PATIENT INSTRUCTIONS
PATIENT INSTRUCTIONS - Podiatry / Foot & Ankle Surgery    Fungus - PCP        [facility]  [facility]  [address]  [place] [address]  Phone: [phone]  Fax: [phone] [facility]  [address]  [place] [address]  Phone: [phone]   Fax: [phone]   [facility]  [address]  [place] [address]  Phone: [phone]  Fax: [phone] [facility]  [address]  [place] [address]  Phone: [phone]   Fax: [phone]   [facility]   [address]  [place] [address]  Phone: [phone]  Fax: [phone] * Please call any location listed to make an appointment for a casting/fitting. Your referral was sent to their central office and they will all have the order on file.

## 2022-07-14 ENCOUNTER — OFFICE VISIT (OUTPATIENT)
Dept: FAMILY MEDICINE | Facility: CLINIC | Age: 72
End: 2022-07-14
Payer: MEDICARE

## 2022-07-14 ENCOUNTER — NURSE TRIAGE (OUTPATIENT)
Dept: NURSING | Facility: CLINIC | Age: 72
End: 2022-07-14

## 2022-07-14 VITALS
WEIGHT: 203 LBS | DIASTOLIC BLOOD PRESSURE: 74 MMHG | HEIGHT: 66 IN | BODY MASS INDEX: 32.62 KG/M2 | SYSTOLIC BLOOD PRESSURE: 128 MMHG | HEART RATE: 112 BPM

## 2022-07-14 DIAGNOSIS — Z79.4 TYPE 2 DIABETES MELLITUS WITH OTHER SPECIFIED COMPLICATION, WITH LONG-TERM CURRENT USE OF INSULIN (H): ICD-10-CM

## 2022-07-14 DIAGNOSIS — E78.5 DYSLIPIDEMIA: ICD-10-CM

## 2022-07-14 DIAGNOSIS — G89.29 CHRONIC BILATERAL LOW BACK PAIN, UNSPECIFIED WHETHER SCIATICA PRESENT: ICD-10-CM

## 2022-07-14 DIAGNOSIS — F17.210 CIGARETTE NICOTINE DEPENDENCE WITHOUT COMPLICATION: ICD-10-CM

## 2022-07-14 DIAGNOSIS — I10 PRIMARY HYPERTENSION: ICD-10-CM

## 2022-07-14 DIAGNOSIS — R00.0 TACHYCARDIA: ICD-10-CM

## 2022-07-14 DIAGNOSIS — Z78.9 ALCOHOL USE: ICD-10-CM

## 2022-07-14 DIAGNOSIS — E11.69 TYPE 2 DIABETES MELLITUS WITH OTHER SPECIFIED COMPLICATION, WITH LONG-TERM CURRENT USE OF INSULIN (H): ICD-10-CM

## 2022-07-14 DIAGNOSIS — Z01.818 PREOP GENERAL PHYSICAL EXAM: Primary | ICD-10-CM

## 2022-07-14 DIAGNOSIS — M54.50 CHRONIC BILATERAL LOW BACK PAIN, UNSPECIFIED WHETHER SCIATICA PRESENT: ICD-10-CM

## 2022-07-14 LAB
ANION GAP SERPL CALCULATED.3IONS-SCNC: 11 MMOL/L (ref 7–15)
BUN SERPL-MCNC: 14.9 MG/DL (ref 8–23)
CALCIUM SERPL-MCNC: 10 MG/DL (ref 8.8–10.2)
CHLORIDE SERPL-SCNC: 102 MMOL/L (ref 98–107)
CREAT SERPL-MCNC: 1.14 MG/DL (ref 0.67–1.17)
DEPRECATED HCO3 PLAS-SCNC: 22 MMOL/L (ref 22–29)
ERYTHROCYTE [DISTWIDTH] IN BLOOD BY AUTOMATED COUNT: 14.8 % (ref 10–15)
GFR SERPL CREATININE-BSD FRML MDRD: 69 ML/MIN/1.73M2
GLUCOSE SERPL-MCNC: 146 MG/DL (ref 70–99)
HCT VFR BLD AUTO: 38.2 % (ref 40–53)
HGB BLD-MCNC: 13 G/DL (ref 13.3–17.7)
MCH RBC QN AUTO: 32.8 PG (ref 26.5–33)
MCHC RBC AUTO-ENTMCNC: 34 G/DL (ref 31.5–36.5)
MCV RBC AUTO: 97 FL (ref 78–100)
PLATELET # BLD AUTO: 192 10E3/UL (ref 150–450)
POTASSIUM SERPL-SCNC: 6.2 MMOL/L (ref 3.4–5.3)
RBC # BLD AUTO: 3.96 10E6/UL (ref 4.4–5.9)
SODIUM SERPL-SCNC: 135 MMOL/L (ref 136–145)
VIT B1 PYROPHOSHATE BLD-SCNC: 153 NMOL/L
WBC # BLD AUTO: 4.2 10E3/UL (ref 4–11)

## 2022-07-14 PROCEDURE — 85027 COMPLETE CBC AUTOMATED: CPT | Performed by: FAMILY MEDICINE

## 2022-07-14 PROCEDURE — 80048 BASIC METABOLIC PNL TOTAL CA: CPT | Performed by: FAMILY MEDICINE

## 2022-07-14 PROCEDURE — 36415 COLL VENOUS BLD VENIPUNCTURE: CPT | Performed by: FAMILY MEDICINE

## 2022-07-14 PROCEDURE — 99215 OFFICE O/P EST HI 40 MIN: CPT | Performed by: FAMILY MEDICINE

## 2022-07-14 NOTE — PROGRESS NOTES
[facility]  [address] [address]  Phone: [phone]  Fax: [phone]  Primary Provider: Smitha Hameed  Pre-op Performing Provider: RADHA BRADY      PREOPERATIVE EVALUATION:  Today's date: 7/14/2022    Donovan Jane is a 71 year old male who presents for a preoperative evaluation.    Surgical Information:  Surgery/Procedure: minimally invasive lumbar decompression at lumbar 2 to lumbar three and lumbar three to lumbar four using a right-sided approach  Surgery Location: [facility]  Surgeon: Kwaku Baker MD  Surgery Date: 7/21/22  Time of Surgery: 7:20 am  Where patient plans to recover: At home with family  Fax number for surgical facility: Note does not need to be faxed, will be available electronically in Epic.    Type of Anesthesia Anticipated: to be determined    Assessment & Plan     The proposed surgical procedure is considered INTERMEDIATE risk.    1.  Preop general physical exam, chronic back pain    - CBC with platelets  - Basic metabolic panel  (Ca, Cl, CO2, Creat, Gluc, K, Na, BUN)    Risks and Recommendations:  The patient has the following additional risks and recommendations for perioperative complications:   - No identified additional risk factors other than previously addressed    You the EKG that was done June 9, 2022 which showed sinus tachycardia with a ventricular rate of 103 bpm.  The EKG that was done May 19, 2022 shows sinus rhythm with a ventricular rate of 78 bpm.  I reviewed the echocardiogram that was done May 19, 2022 which showed left ventricular ejection fraction of 55 to 60%    RECOMMENDATION:  APPROVAL GIVEN to proceed with proposed procedure, without further diagnostic evaluation.    2.  Primary hypertension  Stable on lisinopril 10 mg  No change in medication in preparation for surgery     3.  Tachycardia  Stable 112. No cardiac symptoms    4.  Type 2 diabetes mellitus with other specified  complication, with long-term current use of insulin (H)  A1c=6.5 5/20/22  Reduce insulin to half the dose the night prior to surgery    5.  Dyslipidemia  Stable on atorvastatin 40 mg  No change in medication in preparation for surgery     6.  Alcohol use  Hold naltrexone 1 week prior to surgery  Avoid alcohol    7.  Cigarette nicotine dependence without complication  Avoid smoking 1 week prior to surgery  Smoking cessation counseled        Subjective     HPI related to upcoming procedure:    Preop Questions 7/14/2022   1. Have you ever had a heart attack or stroke? No   2. Have you ever had surgery on your heart or blood vessels, such as a stent placement, a coronary artery bypass, or surgery on an artery in your head, neck, heart, or legs? No   3. Do you have chest pain with activity? No   4. Do you have a history of  heart failure? No   5. Do you currently have a cold, bronchitis or symptoms of other infection? No   6. Do you have a cough, shortness of breath, or wheezing? YES    7. Do you or anyone in your family have previous history of blood clots? No   8. Do you or does anyone in your family have a serious bleeding problem such as prolonged bleeding following surgeries or cuts? No   9. Have you ever had problems with anemia or been told to take iron pills? No   10. Have you had any abnormal blood loss such as black, tarry or bloody stools? No   11. Have you ever had a blood transfusion? No   12. Are you willing to have a blood transfusion if it is medically needed before, during, or after your surgery? Yes   13. Have you or any of your relatives ever had problems with anesthesia? No   14. Do you have sleep apnea, excessive snoring or daytime drowsiness? YES    14a. Do you have a CPAP machine? No   15. Do you have any artifical heart valves or other implanted medical devices like a pacemaker, defibrillator, or continuous glucose monitor? No   16. Do you have artificial joints? No   17. Are you allergic to  latex? No     Status of Chronic Conditions:  See problem list for active medical problems.  Problems all longstanding and stable, except as noted/documented.  See ROS for pertinent symptoms related to these conditions.      Review of Systems  CONSTITUTIONAL: NEGATIVE for fever, chills, change in weight  INTEGUMENTARY/SKIN: NEGATIVE for worrisome rashes, moles or lesions  EYES: NEGATIVE for vision changes or irritation  ENT/MOUTH: NEGATIVE for ear, mouth and throat problems  CV: NEGATIVE for chest pain, palpitations or peripheral edema  GI: NEGATIVE for nausea, abdominal pain, heartburn, or change in bowel habits  : NEGATIVE for frequency, dysuria, or hematuria  NEURO: NEGATIVE for weakness, dizziness or paresthesias  ENDOCRINE: NEGATIVE for temperature intolerance, skin/hair changes  HEME: NEGATIVE for bleeding problems  PSYCHIATRIC: NEGATIVE for changes in mood or affect    Patient Active Problem List    Diagnosis Date Noted     Prolonged Q-T interval on ECG 06/20/2022     Priority: Medium     Formatting of this note might be different from the original.  due to citalopram/seroquel       Tobacco use disorder 06/20/2022     Priority: Medium     Depression 06/20/2022     Priority: Medium     Hypomagnesemia 06/09/2022     Priority: Medium     Lightheadedness 06/09/2022     Priority: Medium     Acute cystitis without hematuria 06/09/2022     Priority: Medium     Gastrointestinal hemorrhage, unspecified gastrointestinal hemorrhage type 05/20/2022     Priority: Medium     Hyponatremia 05/19/2022     Priority: Medium     Syncope, unspecified syncope type 05/19/2022     Priority: Medium     Urinary retention 05/19/2022     Priority: Medium     Anxiety 07/23/2018     Priority: Medium     HTN (hypertension) 07/23/2018     Priority: Medium     Insomnia 07/23/2018     Priority: Medium     Type 2 diabetes mellitus (H) 06/03/2017     Priority: Medium     Dyslipidemia 06/03/2017     Priority: Medium     Alcohol-induced acute  pancreatitis without infection or necrosis 02/10/2017     Priority: Medium     Nicotine dependence, uncomplicated 02/10/2017     Priority: Medium     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  Tubulovillous adenoma consistent with an advance adenoma per colonoscopy done on 10/8/15.       Aspiration pneumonia (H) 10/19/2015     Priority: Medium     Gout 10/14/2015     Priority: Medium     Obesity (BMI 30-39.9) 09/07/2014     Priority: Medium     Constipation 09/07/2014     Priority: Medium     Toxic encephalopathy 09/07/2014     Priority: Medium     Confusion 09/06/2014     Priority: Medium     Low back pain 10/10/2011     Priority: Medium     Spinal stenosis, unspecified region other than cervical 02/09/2007     Priority: Medium     Formatting of this note might be different from the original.  Treated surgically with T12-L2 decompression on 1/18/11.  Formatting of this note might be different from the original.  Overview:   Treated surgically with T12-L2 decompression on 1/18/11.        No past medical history on file.  Past Surgical History:   Procedure Laterality Date     BACK SURGERY       Current Outpatient Medications   Medication Sig Dispense Refill     aspirin 81 MG EC tablet [ASPIRIN 81 MG EC TABLET] Take 81 mg by mouth daily.       atorvastatin (LIPITOR) 40 MG tablet [ATORVASTATIN (LIPITOR) 40 MG TABLET] Take 40 mg by mouth daily.       cyanocobalamin (VITAMIN B-12) 1000 MCG tablet Take 1,000 mcg by mouth daily Gummy       cyclobenzaprine (FLEXERIL) 5 MG tablet Take 1 tablet (5 mg) by mouth 3 times daily as needed for muscle spasms 30 tablet 1     DULoxetine  "(CYMBALTA) 60 MG capsule Take 120 mg by mouth daily       insulin glargine (LANTUS; BASAGLAR) 100 unit/mL (3 mL) pen [INSULIN GLARGINE (LANTUS; BASAGLAR) 100 UNIT/ML (3 ML) PEN] Inject 15 Units under the skin every morning.       lisinopril (ZESTRIL) 10 MG tablet        magnesium gluconate (MAGONATE) 500 (27 Mg) MG tablet Take 1 tablet (500 mg) by mouth 2 times daily 180 tablet 1     naltrexone (DEPADE/REVIA) 50 MG tablet Take 1 tablet (50 mg) by mouth daily 30 tablet 1     thiamine (B-1) 100 MG tablet Take 100 mg by mouth daily       Lidocaine (LIDOCARE) 4 % Patch Place 1 patch onto the skin every 24 hours To prevent lidocaine toxicity, patient should be patch free for 12 hrs daily. (Patient not taking: No sig reported) 15 patch 0       Allergies   Allergen Reactions     Aspirin GI Disturbance        Social History     Tobacco Use     Smoking status: Current Every Day Smoker     Packs/day: 1.50     Years: 15.00     Pack years: 22.50     Types: Cigarettes     Last attempt to quit: 7/23/2018     Years since quitting: 3.9     Smokeless tobacco: Never Used   Substance Use Topics     Alcohol use: Yes     No family history on file.  History   Drug Use     Types: Marijuana         Objective     /74   Pulse 112   Ht 1.676 m (5' 6\")   Wt 92.1 kg (203 lb)   BMI 32.77 kg/m      Physical Exam    GENERAL APPEARANCE: healthy, alert and no distress     EYES: EOMI,  PERRL     NECK: no adenopathy, no asymmetry, masses, or scars and thyroid normal to palpation     RESP: lungs clear to auscultation - no rales, rhonchi or wheezes     CV: regular rates and rhythm, normal S1 S2, no S3 or S4      ABDOMEN:  soft, nontender, no HSM or masses and bowel sounds normal     MS: extremities normal- no gross deformities noted, no evidence of inflammation in joints, FROM in all extremities.     SKIN: no suspicious lesions or rashes     NEURO: Normal strength and tone, sensory exam grossly normal, mentation intact and speech normal     " PSYCH: mentation appears normal. and affect normal/bright     LYMPHATICS: No cervical adenopathy    Recent Labs   Lab Test 06/20/22  1119 06/12/22  0529 06/11/22  0548 06/09/22  1503 05/20/22  0930 05/20/22  0026 05/19/22  1802   HGB 14.1  --  13.0* 14.7   < > 12.9* 14.4     --  205 262   < >  --  289   INR  --   --   --  1.00  --   --  0.99   * 134* 129* 128*   < > 126* 125*   POTASSIUM 5.1*  --  4.0 4.4   < >  --  4.8   CR 1.00  --  0.91 1.12   < >  --  1.54*   A1C  --   --   --   --   --  6.5*  --     < > = values in this interval not displayed.        Diagnostics:  Results for orders placed or performed in visit on 07/14/22   CBC with platelets     Status: Abnormal   Result Value Ref Range    WBC Count 4.2 4.0 - 11.0 10e3/uL    RBC Count 3.96 (L) 4.40 - 5.90 10e6/uL    Hemoglobin 13.0 (L) 13.3 - 17.7 g/dL    Hematocrit 38.2 (L) 40.0 - 53.0 %    MCV 97 78 - 100 fL    MCH 32.8 26.5 - 33.0 pg    MCHC 34.0 31.5 - 36.5 g/dL    RDW 14.8 10.0 - 15.0 %    Platelet Count 192 150 - 450 10e3/uL        Revised Cardiac Risk Index (RCRI):  The patient has the following serious cardiovascular risks for perioperative complications:   - No serious cardiac risks = 0 points     RCRI Interpretation: 0 points: Class I (very low risk - 0.4% complication rate)           Signed Electronically by: Teri Wyatt MD  Copy of this evaluation report is provided to requesting physician.

## 2022-07-14 NOTE — TELEPHONE ENCOUNTER
"Lab reports critical lab: K+ 6.2  \"slightly hemolyzed sample\". Blood drawn at 11:27 AM today. Ordered at today's pre-op physical w/ Dr Jackson. Paged on-call Dr Garcia @5:59pm. Call returned @6:03pm. Dr Garcia says nothing needs to be done now. Likely not this high w/hemolyzed sample. Will forward note to Dr Jackson to follow up tomorrow during clinic.   Note: pt was not called.     Reason for Disposition    Lab or radiology calling with CRITICAL test results    Additional Information    Negative: Lab calling with strep throat test results and triager can call in prescription    Negative: Lab calling with urinalysis test results and triager can call in prescription    Negative: Medication questions    Negative: ED call to PCP    Negative: Physician call to PCP    Negative: Call about patient who is currently hospitalized    Protocols used: PCP CALL - NO TRIAGE-A-      "

## 2022-07-18 DIAGNOSIS — E87.5 SERUM POTASSIUM ELEVATED: Primary | ICD-10-CM

## 2022-07-18 NOTE — TELEPHONE ENCOUNTER
Spoke to patient about the hemolyzed potassium.  He will come back either today or tomorrow for lab redraw

## 2022-07-20 ENCOUNTER — LAB (OUTPATIENT)
Dept: LAB | Facility: CLINIC | Age: 72
End: 2022-07-20
Payer: MEDICARE

## 2022-07-20 ENCOUNTER — ANESTHESIA EVENT (OUTPATIENT)
Dept: SURGERY | Facility: HOSPITAL | Age: 72
End: 2022-07-20
Payer: MEDICARE

## 2022-07-20 DIAGNOSIS — E87.5 SERUM POTASSIUM ELEVATED: ICD-10-CM

## 2022-07-20 LAB — POTASSIUM SERPL-SCNC: 4.9 MMOL/L (ref 3.4–5.3)

## 2022-07-20 PROCEDURE — 84132 ASSAY OF SERUM POTASSIUM: CPT

## 2022-07-20 PROCEDURE — 36415 COLL VENOUS BLD VENIPUNCTURE: CPT

## 2022-07-21 ENCOUNTER — HOSPITAL ENCOUNTER (OUTPATIENT)
Facility: HOSPITAL | Age: 72
Discharge: HOME | End: 2022-07-21
Attending: NEUROLOGICAL SURGERY | Admitting: NEUROLOGICAL SURGERY
Payer: MEDICARE

## 2022-07-21 ENCOUNTER — TELEPHONE (OUTPATIENT)
Dept: NEUROSURGERY | Facility: CLINIC | Age: 72
End: 2022-07-21

## 2022-07-21 ENCOUNTER — APPOINTMENT (OUTPATIENT)
Dept: RADIOLOGY | Facility: HOSPITAL | Age: 72
End: 2022-07-21
Attending: PHYSICIAN ASSISTANT
Payer: MEDICARE

## 2022-07-21 ENCOUNTER — ANESTHESIA (OUTPATIENT)
Dept: SURGERY | Facility: HOSPITAL | Age: 72
End: 2022-07-21
Payer: MEDICARE

## 2022-07-21 VITALS
SYSTOLIC BLOOD PRESSURE: 128 MMHG | WEIGHT: 204.8 LBS | HEART RATE: 96 BPM | DIASTOLIC BLOOD PRESSURE: 73 MMHG | RESPIRATION RATE: 20 BRPM | TEMPERATURE: 97.3 F | OXYGEN SATURATION: 94 % | BODY MASS INDEX: 33.06 KG/M2

## 2022-07-21 DIAGNOSIS — M48.062 SPINAL STENOSIS OF LUMBAR REGION WITH NEUROGENIC CLAUDICATION: Primary | ICD-10-CM

## 2022-07-21 DIAGNOSIS — M48.062 LUMBAR STENOSIS WITH NEUROGENIC CLAUDICATION: Primary | ICD-10-CM

## 2022-07-21 LAB
GLUCOSE BLDC GLUCOMTR-MCNC: 128 MG/DL (ref 70–99)
GLUCOSE BLDC GLUCOMTR-MCNC: 150 MG/DL (ref 70–99)

## 2022-07-21 PROCEDURE — 250N000011 HC RX IP 250 OP 636: Performed by: ANESTHESIOLOGY

## 2022-07-21 PROCEDURE — 250N000009 HC RX 250: Performed by: NEUROLOGICAL SURGERY

## 2022-07-21 PROCEDURE — 250N000009 HC RX 250: Performed by: NURSE ANESTHETIST, CERTIFIED REGISTERED

## 2022-07-21 PROCEDURE — 370N000017 HC ANESTHESIA TECHNICAL FEE, PER MIN: Performed by: NEUROLOGICAL SURGERY

## 2022-07-21 PROCEDURE — 63048 LAM FACETEC &FORAMOT EA ADDL: CPT | Performed by: NEUROLOGICAL SURGERY

## 2022-07-21 PROCEDURE — 710N000012 HC RECOVERY PHASE 2, PER MINUTE: Performed by: NEUROLOGICAL SURGERY

## 2022-07-21 PROCEDURE — 250N000025 HC SEVOFLURANE, PER MIN: Performed by: NEUROLOGICAL SURGERY

## 2022-07-21 PROCEDURE — 250N000009 HC RX 250: Performed by: ANESTHESIOLOGY

## 2022-07-21 PROCEDURE — 272N000004 HC RX 272: Performed by: NEUROLOGICAL SURGERY

## 2022-07-21 PROCEDURE — 82962 GLUCOSE BLOOD TEST: CPT

## 2022-07-21 PROCEDURE — 250N000013 HC RX MED GY IP 250 OP 250 PS 637: Performed by: PHYSICIAN ASSISTANT

## 2022-07-21 PROCEDURE — 63048 LAM FACETEC &FORAMOT EA ADDL: CPT | Mod: AS | Performed by: PHYSICIAN ASSISTANT

## 2022-07-21 PROCEDURE — 360N000084 HC SURGERY LEVEL 4 W/ FLUORO, PER MIN: Performed by: NEUROLOGICAL SURGERY

## 2022-07-21 PROCEDURE — 999N000182 XR SURGERY CARM FLUORO GREATER THAN 5 MIN

## 2022-07-21 PROCEDURE — 999N000141 HC STATISTIC PRE-PROCEDURE NURSING ASSESSMENT: Performed by: NEUROLOGICAL SURGERY

## 2022-07-21 PROCEDURE — 710N000009 HC RECOVERY PHASE 1, LEVEL 1, PER MIN: Performed by: NEUROLOGICAL SURGERY

## 2022-07-21 PROCEDURE — 63047 LAM FACETEC & FORAMOT LUMBAR: CPT | Performed by: NEUROLOGICAL SURGERY

## 2022-07-21 PROCEDURE — 258N000003 HC RX IP 258 OP 636: Performed by: ANESTHESIOLOGY

## 2022-07-21 PROCEDURE — 258N000003 HC RX IP 258 OP 636: Performed by: NURSE ANESTHETIST, CERTIFIED REGISTERED

## 2022-07-21 PROCEDURE — 250N000011 HC RX IP 250 OP 636: Performed by: PHYSICIAN ASSISTANT

## 2022-07-21 PROCEDURE — 272N000001 HC OR GENERAL SUPPLY STERILE: Performed by: NEUROLOGICAL SURGERY

## 2022-07-21 PROCEDURE — 250N000011 HC RX IP 250 OP 636: Performed by: NURSE ANESTHETIST, CERTIFIED REGISTERED

## 2022-07-21 PROCEDURE — 63047 LAM FACETEC & FORAMOT LUMBAR: CPT | Mod: AS | Performed by: PHYSICIAN ASSISTANT

## 2022-07-21 RX ORDER — CEFAZOLIN SODIUM/WATER 2 G/20 ML
2 SYRINGE (ML) INTRAVENOUS
Status: COMPLETED | OUTPATIENT
Start: 2022-07-21 | End: 2022-07-21

## 2022-07-21 RX ORDER — OXYCODONE HYDROCHLORIDE 5 MG/1
5 TABLET ORAL EVERY 4 HOURS PRN
Qty: 42 TABLET | Refills: 0 | Status: SHIPPED | OUTPATIENT
Start: 2022-07-21 | End: 2022-07-29

## 2022-07-21 RX ORDER — CEFAZOLIN SODIUM/WATER 2 G/20 ML
2 SYRINGE (ML) INTRAVENOUS SEE ADMIN INSTRUCTIONS
Status: DISCONTINUED | OUTPATIENT
Start: 2022-07-21 | End: 2022-07-21 | Stop reason: HOSPADM

## 2022-07-21 RX ORDER — NALOXONE HYDROCHLORIDE 1 MG/ML
0.4 INJECTION INTRAMUSCULAR; INTRAVENOUS; SUBCUTANEOUS
Status: DISCONTINUED | OUTPATIENT
Start: 2022-07-21 | End: 2022-07-21 | Stop reason: HOSPADM

## 2022-07-21 RX ORDER — OXYCODONE HYDROCHLORIDE 5 MG/1
10 TABLET ORAL EVERY 4 HOURS PRN
Status: DISCONTINUED | OUTPATIENT
Start: 2022-07-21 | End: 2022-07-21 | Stop reason: HOSPADM

## 2022-07-21 RX ORDER — OXYCODONE HYDROCHLORIDE 5 MG/1
5 TABLET ORAL EVERY 4 HOURS PRN
Status: DISCONTINUED | OUTPATIENT
Start: 2022-07-21 | End: 2022-07-21 | Stop reason: HOSPADM

## 2022-07-21 RX ORDER — NALOXONE HYDROCHLORIDE 1 MG/ML
0.2 INJECTION INTRAMUSCULAR; INTRAVENOUS; SUBCUTANEOUS
Status: DISCONTINUED | OUTPATIENT
Start: 2022-07-21 | End: 2022-07-21 | Stop reason: HOSPADM

## 2022-07-21 RX ORDER — FENTANYL CITRATE 50 UG/ML
25 INJECTION, SOLUTION INTRAMUSCULAR; INTRAVENOUS EVERY 5 MIN PRN
Status: DISCONTINUED | OUTPATIENT
Start: 2022-07-21 | End: 2022-07-21 | Stop reason: HOSPADM

## 2022-07-21 RX ORDER — ACETAMINOPHEN 325 MG/1
650 TABLET ORAL EVERY 4 HOURS PRN
Status: DISCONTINUED | OUTPATIENT
Start: 2022-07-24 | End: 2022-07-21 | Stop reason: HOSPADM

## 2022-07-21 RX ORDER — METHOCARBAMOL 500 MG/1
500 TABLET, FILM COATED ORAL 4 TIMES DAILY PRN
Qty: 42 TABLET | Refills: 1 | Status: SHIPPED | OUTPATIENT
Start: 2022-07-21 | End: 2022-07-21

## 2022-07-21 RX ORDER — DEXAMETHASONE SODIUM PHOSPHATE 4 MG/ML
INJECTION, SOLUTION INTRA-ARTICULAR; INTRALESIONAL; INTRAMUSCULAR; INTRAVENOUS; SOFT TISSUE PRN
Status: DISCONTINUED | OUTPATIENT
Start: 2022-07-21 | End: 2022-07-21

## 2022-07-21 RX ORDER — MEPERIDINE HYDROCHLORIDE 25 MG/ML
12.5 INJECTION INTRAMUSCULAR; INTRAVENOUS; SUBCUTANEOUS
Status: DISCONTINUED | OUTPATIENT
Start: 2022-07-21 | End: 2022-07-21 | Stop reason: HOSPADM

## 2022-07-21 RX ORDER — ONDANSETRON 4 MG/1
4 TABLET, ORALLY DISINTEGRATING ORAL EVERY 30 MIN PRN
Status: DISCONTINUED | OUTPATIENT
Start: 2022-07-21 | End: 2022-07-21 | Stop reason: HOSPADM

## 2022-07-21 RX ORDER — FENTANYL CITRATE 50 UG/ML
25 INJECTION, SOLUTION INTRAMUSCULAR; INTRAVENOUS
Status: DISCONTINUED | OUTPATIENT
Start: 2022-07-21 | End: 2022-07-21 | Stop reason: HOSPADM

## 2022-07-21 RX ORDER — ONDANSETRON 2 MG/ML
4 INJECTION INTRAMUSCULAR; INTRAVENOUS EVERY 6 HOURS PRN
Status: DISCONTINUED | OUTPATIENT
Start: 2022-07-21 | End: 2022-07-21 | Stop reason: HOSPADM

## 2022-07-21 RX ORDER — PROPOFOL 10 MG/ML
INJECTION, EMULSION INTRAVENOUS CONTINUOUS PRN
Status: DISCONTINUED | OUTPATIENT
Start: 2022-07-21 | End: 2022-07-21

## 2022-07-21 RX ORDER — ONDANSETRON 2 MG/ML
4 INJECTION INTRAMUSCULAR; INTRAVENOUS EVERY 30 MIN PRN
Status: DISCONTINUED | OUTPATIENT
Start: 2022-07-21 | End: 2022-07-21 | Stop reason: HOSPADM

## 2022-07-21 RX ORDER — ACETAMINOPHEN 325 MG/1
975 TABLET ORAL EVERY 8 HOURS
Status: DISCONTINUED | OUTPATIENT
Start: 2022-07-21 | End: 2022-07-21 | Stop reason: HOSPADM

## 2022-07-21 RX ORDER — SODIUM CHLORIDE, SODIUM LACTATE, POTASSIUM CHLORIDE, CALCIUM CHLORIDE 600; 310; 30; 20 MG/100ML; MG/100ML; MG/100ML; MG/100ML
INJECTION, SOLUTION INTRAVENOUS CONTINUOUS
Status: DISCONTINUED | OUTPATIENT
Start: 2022-07-21 | End: 2022-07-21 | Stop reason: HOSPADM

## 2022-07-21 RX ORDER — IPRATROPIUM BROMIDE AND ALBUTEROL SULFATE 2.5; .5 MG/3ML; MG/3ML
3 SOLUTION RESPIRATORY (INHALATION) ONCE
Status: COMPLETED | OUTPATIENT
Start: 2022-07-21 | End: 2022-07-21

## 2022-07-21 RX ORDER — PROCHLORPERAZINE MALEATE 5 MG
5 TABLET ORAL EVERY 6 HOURS PRN
Status: DISCONTINUED | OUTPATIENT
Start: 2022-07-21 | End: 2022-07-21 | Stop reason: HOSPADM

## 2022-07-21 RX ORDER — TIZANIDINE 2 MG/1
2 TABLET ORAL 3 TIMES DAILY PRN
Qty: 30 TABLET | Refills: 0 | Status: SHIPPED | OUTPATIENT
Start: 2022-07-21 | End: 2022-09-02

## 2022-07-21 RX ORDER — LIDOCAINE HYDROCHLORIDE 10 MG/ML
INJECTION, SOLUTION INFILTRATION; PERINEURAL PRN
Status: DISCONTINUED | OUTPATIENT
Start: 2022-07-21 | End: 2022-07-21

## 2022-07-21 RX ORDER — LIDOCAINE 40 MG/G
CREAM TOPICAL
Status: DISCONTINUED | OUTPATIENT
Start: 2022-07-21 | End: 2022-07-21 | Stop reason: HOSPADM

## 2022-07-21 RX ORDER — MAGNESIUM HYDROXIDE 1200 MG/15ML
LIQUID ORAL PRN
Status: DISCONTINUED | OUTPATIENT
Start: 2022-07-21 | End: 2022-07-21 | Stop reason: HOSPADM

## 2022-07-21 RX ORDER — ONDANSETRON 4 MG/1
4 TABLET, ORALLY DISINTEGRATING ORAL EVERY 6 HOURS PRN
Status: DISCONTINUED | OUTPATIENT
Start: 2022-07-21 | End: 2022-07-21 | Stop reason: HOSPADM

## 2022-07-21 RX ORDER — KETOROLAC TROMETHAMINE 30 MG/ML
15 INJECTION, SOLUTION INTRAMUSCULAR; INTRAVENOUS ONCE
Status: DISCONTINUED | OUTPATIENT
Start: 2022-07-21 | End: 2022-07-21

## 2022-07-21 RX ORDER — FENTANYL CITRATE 50 UG/ML
INJECTION, SOLUTION INTRAMUSCULAR; INTRAVENOUS PRN
Status: DISCONTINUED | OUTPATIENT
Start: 2022-07-21 | End: 2022-07-21

## 2022-07-21 RX ORDER — PROPOFOL 10 MG/ML
INJECTION, EMULSION INTRAVENOUS PRN
Status: DISCONTINUED | OUTPATIENT
Start: 2022-07-21 | End: 2022-07-21

## 2022-07-21 RX ADMIN — Medication 2 G: at 07:45

## 2022-07-21 RX ADMIN — FENTANYL CITRATE 25 MCG: 50 INJECTION, SOLUTION INTRAMUSCULAR; INTRAVENOUS at 11:23

## 2022-07-21 RX ADMIN — FENTANYL CITRATE 25 MCG: 50 INJECTION, SOLUTION INTRAMUSCULAR; INTRAVENOUS at 11:32

## 2022-07-21 RX ADMIN — SODIUM CHLORIDE, POTASSIUM CHLORIDE, SODIUM LACTATE AND CALCIUM CHLORIDE: 600; 310; 30; 20 INJECTION, SOLUTION INTRAVENOUS at 06:52

## 2022-07-21 RX ADMIN — PHENYLEPHRINE HYDROCHLORIDE 0.3 MCG/KG/MIN: 10 INJECTION INTRAVENOUS at 09:00

## 2022-07-21 RX ADMIN — HYDROMORPHONE HYDROCHLORIDE 0.5 MG: 1 INJECTION, SOLUTION INTRAMUSCULAR; INTRAVENOUS; SUBCUTANEOUS at 09:15

## 2022-07-21 RX ADMIN — OXYCODONE HYDROCHLORIDE 10 MG: 5 TABLET ORAL at 12:49

## 2022-07-21 RX ADMIN — PROPOFOL 50 MCG/KG/MIN: 10 INJECTION, EMULSION INTRAVENOUS at 08:00

## 2022-07-21 RX ADMIN — ROCURONIUM BROMIDE 20 MG: 50 INJECTION, SOLUTION INTRAVENOUS at 10:00

## 2022-07-21 RX ADMIN — PHENYLEPHRINE HYDROCHLORIDE 100 MCG: 10 INJECTION INTRAVENOUS at 09:00

## 2022-07-21 RX ADMIN — FENTANYL CITRATE 25 MCG: 50 INJECTION, SOLUTION INTRAMUSCULAR; INTRAVENOUS at 11:44

## 2022-07-21 RX ADMIN — PHENYLEPHRINE HYDROCHLORIDE 100 MCG: 10 INJECTION INTRAVENOUS at 08:50

## 2022-07-21 RX ADMIN — SODIUM CHLORIDE, POTASSIUM CHLORIDE, SODIUM LACTATE AND CALCIUM CHLORIDE: 600; 310; 30; 20 INJECTION, SOLUTION INTRAVENOUS at 08:40

## 2022-07-21 RX ADMIN — FENTANYL CITRATE 50 MCG: 50 INJECTION, SOLUTION INTRAMUSCULAR; INTRAVENOUS at 07:38

## 2022-07-21 RX ADMIN — IPRATROPIUM BROMIDE AND ALBUTEROL SULFATE 3 ML: .5; 3 SOLUTION RESPIRATORY (INHALATION) at 06:56

## 2022-07-21 RX ADMIN — ROCURONIUM BROMIDE 50 MG: 50 INJECTION, SOLUTION INTRAVENOUS at 07:48

## 2022-07-21 RX ADMIN — PHENYLEPHRINE HYDROCHLORIDE 100 MCG: 10 INJECTION INTRAVENOUS at 08:35

## 2022-07-21 RX ADMIN — HYDROMORPHONE HYDROCHLORIDE 0.5 MG: 1 INJECTION, SOLUTION INTRAMUSCULAR; INTRAVENOUS; SUBCUTANEOUS at 10:43

## 2022-07-21 RX ADMIN — SUGAMMADEX 200 MG: 100 INJECTION, SOLUTION INTRAVENOUS at 10:55

## 2022-07-21 RX ADMIN — FENTANYL CITRATE 25 MCG: 50 INJECTION, SOLUTION INTRAMUSCULAR; INTRAVENOUS at 12:08

## 2022-07-21 RX ADMIN — LIDOCAINE HYDROCHLORIDE 30 MG: 10 INJECTION, SOLUTION INFILTRATION; PERINEURAL at 07:48

## 2022-07-21 RX ADMIN — HYDROMORPHONE HYDROCHLORIDE 0.5 MG: 1 INJECTION, SOLUTION INTRAMUSCULAR; INTRAVENOUS; SUBCUTANEOUS at 09:35

## 2022-07-21 RX ADMIN — PROPOFOL 200 MG: 10 INJECTION, EMULSION INTRAVENOUS at 07:48

## 2022-07-21 RX ADMIN — FENTANYL CITRATE 50 MCG: 50 INJECTION, SOLUTION INTRAMUSCULAR; INTRAVENOUS at 07:48

## 2022-07-21 RX ADMIN — HYDROMORPHONE HYDROCHLORIDE 0.5 MG: 1 INJECTION, SOLUTION INTRAMUSCULAR; INTRAVENOUS; SUBCUTANEOUS at 10:45

## 2022-07-21 RX ADMIN — MIDAZOLAM 2 MG: 1 INJECTION INTRAMUSCULAR; INTRAVENOUS at 07:35

## 2022-07-21 RX ADMIN — PHENYLEPHRINE HYDROCHLORIDE 100 MCG: 10 INJECTION INTRAVENOUS at 08:17

## 2022-07-21 RX ADMIN — FENTANYL CITRATE 25 MCG: 50 INJECTION, SOLUTION INTRAMUSCULAR; INTRAVENOUS at 11:27

## 2022-07-21 RX ADMIN — FENTANYL CITRATE 25 MCG: 50 INJECTION, SOLUTION INTRAMUSCULAR; INTRAVENOUS at 11:59

## 2022-07-21 RX ADMIN — ROCURONIUM BROMIDE 20 MG: 50 INJECTION, SOLUTION INTRAVENOUS at 09:00

## 2022-07-21 RX ADMIN — DEXAMETHASONE SODIUM PHOSPHATE 4 MG: 4 INJECTION, SOLUTION INTRA-ARTICULAR; INTRALESIONAL; INTRAMUSCULAR; INTRAVENOUS; SOFT TISSUE at 08:00

## 2022-07-21 RX ADMIN — PHENYLEPHRINE HYDROCHLORIDE 100 MCG: 10 INJECTION INTRAVENOUS at 08:15

## 2022-07-21 NOTE — TELEPHONE ENCOUNTER
PATIENT NAME:  Donovan Jane  YOB: 1950  MRN: [id number]  SURGEON: Dr. Baker  DATE of SURGERY: 07/21/2022  PROCEDURE: MIS lumbar decompression L2-3 and L3-4    FOLLOW-UP:  Wound Check:  7-10 days  Staples/Sutures Out: n/a  Post Op Visit: 6 weeks  Post-op Provider: JACKELYN  DIAGNOSTICS: n/a  DISPOSITION: Home same day    ADDITIONAL INSTRUCTIONS FOR MEDICAL STAFF:        Nessa Rivera RN, CNRN

## 2022-07-21 NOTE — PROGRESS NOTES
Patient able to ambulate around the unit with 1 person assist.Denies any pain and not in any distress. Patient verbalized that numbness is lesser and getting better.

## 2022-07-21 NOTE — ANESTHESIA PREPROCEDURE EVALUATION
Anesthesia Pre-Procedure Evaluation    Patient: Donovan Jane   MRN: [id number] : 1950        Procedure : Procedure(s):  minimally invasive lumbar decompression at lumbar 2 to lumbar three and lumbar three to lumbar four using a right-sided approach          Past Medical History:   Diagnosis Date     Alcohol dependence (H)     states he drinks rarely     Alcohol-induced acute pancreatitis      Depression      Diabetes (H)      Dyslipidemia      Gout      Hypertension      Irregular heart beat       Past Surgical History:   Procedure Laterality Date     BACK SURGERY      states he has had 5 back surgeries     CARPAL TUNNEL RELEASE RT/LT Right      SINUS SURGERY Bilateral       Allergies   Allergen Reactions     Aspirin GI Disturbance      Social History     Tobacco Use     Smoking status: Current Every Day Smoker     Packs/day: 1.00     Years: 40.00     Pack years: 40.00     Types: Cigarettes     Smokeless tobacco: Never Used   Substance Use Topics     Alcohol use: Yes      Wt Readings from Last 1 Encounters:   22 92.9 kg (204 lb 12.8 oz)        Anesthesia Evaluation            ROS/MED HX  ENT/Pulmonary:  - neg pulmonary ROS     Neurologic:  - neg neurologic ROS     Cardiovascular:     (+) hypertension-----Irregular Heartbeat/Palpitations,     METS/Exercise Tolerance:     Hematologic:  - neg hematologic  ROS     Musculoskeletal:  - neg musculoskeletal ROS     GI/Hepatic:  - neg GI/hepatic ROS     Renal/Genitourinary:  - neg Renal ROS     Endo:     (+) type II DM, Obesity,     Psychiatric/Substance Use:  - neg psychiatric ROS     Infectious Disease:  - neg infectious disease ROS     Malignancy:  - neg malignancy ROS     Other:            Physical Exam    Airway  airway exam normal           Respiratory Devices and Support         Dental  no notable dental history         Cardiovascular   cardiovascular exam normal          Pulmonary   pulmonary exam normal                OUTSIDE LABS:  CBC:   Lab  Results   Component Value Date    WBC 4.2 07/14/2022    WBC 4.2 06/20/2022    HGB 13.0 (L) 07/14/2022    HGB 14.1 06/20/2022    HCT 38.2 (L) 07/14/2022    HCT 41.5 06/20/2022     07/14/2022     06/20/2022     BMP:   Lab Results   Component Value Date     (L) 07/14/2022     (L) 06/20/2022    POTASSIUM 4.9 07/20/2022    POTASSIUM 6.2 (HH) 07/14/2022    CHLORIDE 102 07/14/2022    CHLORIDE 98 06/20/2022    CO2 22 07/14/2022    CO2 25 06/20/2022    BUN 14.9 07/14/2022    BUN 14 06/20/2022    CR 1.14 07/14/2022    CR 1.00 06/20/2022     (H) 07/21/2022     (H) 07/14/2022     COAGS:   Lab Results   Component Value Date    PTT 32 05/19/2022    INR 1.00 06/09/2022     POC: No results found for: BGM, HCG, HCGS  HEPATIC:   Lab Results   Component Value Date    ALBUMIN 4.0 06/20/2022    PROTTOTAL 7.3 06/20/2022    ALT 14 06/20/2022    AST 17 06/20/2022    ALKPHOS 75 06/20/2022    BILITOTAL 0.5 06/20/2022     OTHER:   Lab Results   Component Value Date    LACT 0.8 06/10/2022    A1C 6.5 (H) 05/20/2022    OSCAR 10.0 07/14/2022    MAG 1.7 (L) 06/20/2022    LIPASE 48 06/20/2022    TSH 1.68 07/11/2022       Anesthesia Plan    ASA Status:  3      Anesthesia Type: General.     - Airway: ETT   Induction: Intravenous, Propofol.           Consents    Anesthesia Plan(s) and associated risks, benefits, and realistic alternatives discussed. Questions answered and patient/representative(s) expressed understanding.     - Discussed: Risks, Benefits and Alternatives for BOTH SEDATION and the PROCEDURE were discussed     - Discussed with:  Patient         Postoperative Care    Pain management: Multi-modal analgesia.   PONV prophylaxis: Ondansetron (or other 5HT-3), Dexamethasone or Solumedrol     Comments:                Bassam Simms MD

## 2022-07-21 NOTE — ANESTHESIA PROCEDURE NOTES
Airway       Patient location during procedure: OR       Procedure Start/Stop Times: 7/21/2022 7:50 AM and 7/21/2022 7:52 AM  Staff -        CRNA: Juventino Simms APRN CRNA       Performed By: CRNAIndications and Patient Condition       Indications for airway management: laura-procedural       Induction type:intravenous       Mask difficulty assessment: 2 - vent by mask + OA or adjuvant +/- NMBA    Final Airway Details       Final airway type: endotracheal airway       Successful airway: ETT - single  Endotracheal Airway Details        ETT size (mm): 7.5       Cuffed: yes       Successful intubation technique: direct laryngoscopy       DL Blade Type: Verma 2       Adjucts: stylet and tooth guard       Position: Right       Measured from: lips       Secured at (cm): 24       Bite block used: Soft    Post intubation assessment        Placement verified by: capnometry, equal breath sounds and chest rise        Number of attempts at approach: 1       Secured with: silk tape       Ease of procedure: easy       Dentition: Intact and Unchanged    Medication(s) Administered   Medication Administration Time: 7/21/2022 7:50 AM

## 2022-07-21 NOTE — OP NOTE
Name of procedure: Minimally invasive lumbar decompression L2-3 and L3-4; use the operating microscope    Preoperative diagnosis: High-grade lumbar spinal stenosis L2-3 and L3-4 refractory to prolonged conservative management    Postoperative diagnosis: Same    Surgeon: Kwaku Baker MD    First Assistant: Meche Montgomery PA-C    Material for the laboratory for examination: None    Description of the procedure: Patient was brought to the operating room where he was placed under suitable general endotracheal anesthesia and subsequently positioned in the prone position on lumbar rolls.  His lower back was sterilely prepped and draped in the usual fashion and a right paramedian incision approximately 2 cm in length was made at the L3 level as determined with lateral fluoroscopy.  Standard Metrix technique was used to place a 1.8 x 5 cm Metrix retractor tube at the right L2 hemilamina.  The tube was secured to the table using the articulated mechanical arm.  Final placement was verified with lateral fluoroscopy and the operating microscope was brought into use and used throughout the remainder of the procedure for visualization, illumination and microsurgical technique.  Midas Barrett drill was used to perform a near complete right L2 hemilaminectomy and partial medial facetectomy.  Markedly thickened ligamentum flavum underlying was fenestrated and removed and the Spinal canal and lateral recess well decompressed and mid body of L2 to the mid body of L3.  Metrix retractor tube was then tipped medially and a standard contralateral MIS decompression was performed over the same range on the left side of the canal.  Once again a very high-grade spinal stenosis secondary to facet ligamentous hypertrophy was uncovered and removed.  There was no difficulty with the dura.  Hemostasis was achieved with bipolar cautery and the wound was santos irrigated with Ancef containing irrigation.  Metrix retractor tube was  withdrawn.    Attention was directed downward to the L4-5 level where essentially the same technical procedure was performed.  There was significant stenosis noted at this level although not as severe as that found at the L2-3 level.  Once again bilateral MIS decompression was performed from the mid body of L3 to the mid body of L4 and the fullest extent of the lateral recess.  There was no difficulty with the dura and hemostasis was again achieved with bipolar cautery and small pledgets of thrombin-soaked Gelfoam.  Wound was copiously irrigated with Ancef containing irrigation and then the Metrix retractor tube was withdrawn.  Wound was closed in layers using interrupted inverted 3-0 Vicryl suture with a running undyed 4-0 Vicryl in a subcuticular stitch for skin.  Sterile dressing was applied the patient was subsequently returned to the supine position where he is awakened extubated and transported to recovery room in stable condition.    Ms. Montgomery acted as a first assistant throughout the course of the case.  This was necessary for retraction of vital structures and assistance with the pursuit of this decompression.

## 2022-07-21 NOTE — ANESTHESIA POSTPROCEDURE EVALUATION
Patient: Donovan Jane    Procedure: Procedure(s):  minimally invasive lumbar decompression at lumbar 2 to lumbar three and lumbar three to lumbar four using a right-sided approach       Anesthesia Type:  General    Note:     Postop Pain Control: Uneventful            Sign Out: Well controlled pain   PONV: No   Neuro/Psych: Uneventful            Sign Out: Acceptable/Baseline neuro status   Airway/Respiratory: Uneventful            Sign Out: Acceptable/Baseline resp. status   CV/Hemodynamics: Uneventful            Sign Out: Acceptable CV status; No obvious hypovolemia; No obvious fluid overload   Other NRE: NONE   DID A NON-ROUTINE EVENT OCCUR? No           Last vitals:  Vitals Value Taken Time   /75 07/21/22 1200   Temp 36.3  C (97.3  F) 07/21/22 1200   Pulse 81 07/21/22 1214   Resp 9 07/21/22 1210   SpO2 96 % 07/21/22 1214   Vitals shown include unvalidated device data.    Electronically Signed By: Bassam Simms MD  July 21, 2022  12:15 PM

## 2022-07-21 NOTE — ANESTHESIA CARE TRANSFER NOTE
Patient: Donovan Jane    Procedure: Procedure(s):  minimally invasive lumbar decompression at lumbar 2 to lumbar three and lumbar three to lumbar four using a right-sided approach       Diagnosis: Lumbar stenosis with neurogenic claudication [M48.062]  Diagnosis Additional Information: No value filed.    Anesthesia Type:   General     Note:    Oropharynx: oropharynx clear of all foreign objects  Level of Consciousness: awake  Oxygen Supplementation: face mask  Level of Supplemental Oxygen (L/min / FiO2): 6  Independent Airway: airway patency satisfactory and stable  Dentition: dentition unchanged  Vital Signs Stable: post-procedure vital signs reviewed and stable  Report to RN Given: handoff report given  Patient transferred to: PACU    Handoff Report: Identifed the Patient, Identified the Reponsible Provider, Reviewed the pertinent medical history, Discussed the surgical course, Reviewed Intra-OP anesthesia mangement and issues during anesthesia, Set expectations for post-procedure period and Allowed opportunity for questions and acknowledgement of understanding      Vitals:  Vitals Value Taken Time   /64 07/21/22 1105   Temp     Pulse 75 07/21/22 1108   Resp 35 07/21/22 1108   SpO2 100 % 07/21/22 1108   Vitals shown include unvalidated device data.    Electronically Signed By: МАРИНА Toussaint CRNA  July 21, 2022  11:09 AM

## 2022-07-21 NOTE — PROGRESS NOTES
Pt's insurance does not cover robaxin, cancelled and switched to zanaflex 2mg TID PRN.    Traci Rogers RN

## 2022-07-23 ENCOUNTER — HEALTH MAINTENANCE LETTER (OUTPATIENT)
Age: 72
End: 2022-07-23

## 2022-07-29 ENCOUNTER — TELEPHONE (OUTPATIENT)
Dept: NEUROLOGY | Facility: CLINIC | Age: 72
End: 2022-07-29

## 2022-07-29 ENCOUNTER — ALLIED HEALTH/NURSE VISIT (OUTPATIENT)
Dept: NEUROSURGERY | Facility: CLINIC | Age: 72
End: 2022-07-29
Payer: MEDICARE

## 2022-07-29 VITALS — HEART RATE: 78 BPM | DIASTOLIC BLOOD PRESSURE: 85 MMHG | SYSTOLIC BLOOD PRESSURE: 137 MMHG | RESPIRATION RATE: 18 BRPM

## 2022-07-29 DIAGNOSIS — M48.062 LUMBAR STENOSIS WITH NEUROGENIC CLAUDICATION: Primary | ICD-10-CM

## 2022-07-29 DIAGNOSIS — M48.062 SPINAL STENOSIS OF LUMBAR REGION WITH NEUROGENIC CLAUDICATION: ICD-10-CM

## 2022-07-29 PROCEDURE — 99207 PR NO CHARGE NURSE ONLY: CPT

## 2022-07-29 RX ORDER — TIZANIDINE 2 MG/1
2 TABLET ORAL 3 TIMES DAILY
Qty: 30 TABLET | Refills: 0 | Status: SHIPPED | OUTPATIENT
Start: 2022-07-29 | End: 2022-09-02

## 2022-07-29 RX ORDER — OXYCODONE HYDROCHLORIDE 5 MG/1
5 TABLET ORAL EVERY 6 HOURS PRN
Qty: 28 TABLET | Refills: 0 | Status: SHIPPED | OUTPATIENT
Start: 2022-07-29 | End: 2023-01-27

## 2022-07-29 RX ORDER — LIDOCAINE 4 G/G
1 PATCH TOPICAL EVERY 24 HOURS
Qty: 10 PATCH | Refills: 0 | Status: SHIPPED | OUTPATIENT
Start: 2022-07-29 | End: 2022-09-02

## 2022-07-29 NOTE — PROGRESS NOTES
"Donovan Jane is status post Minimally invasive lumbar decompression L2-3 and L3-4 on 07/21/2022 with Dr. Baker.  Preoperatively presented with bilateral lower extremity neurogenic claudication L>R.  Today he returns in follow up for a wound check. He is doing reasonably well - reports intermittent ache in his hips and incisional \"soreness\". Denies sensory or motor disturbance in LE. Uses a walker for safety. Gait and balance are stable. Takes oxycodone, tizanidine as prescribed. Uses Lidocaine patches prn.  Takes oxycodone 5 mg 2-3 a day prn - requested a refill  [place] query completed.  Printed and scanned into chart.         Surgical wound WNL - CDI, no signs of infection or skin breakdown.  Incision well-healed: good skin approximation, no redness or visible/palpable edema, no tenderness to palpation.  PT. AF, denies fever, chills or sweats.  Pt. reports that the symptoms are improved from pre-op.    Nessa Rivera RN, CNRN      "

## 2022-07-29 NOTE — PATIENT INSTRUCTIONS
A dressing is not required. Your wound is covered with steri-strips.  The steri-strips should fall off in 7-10 days.  If they have not fallen off in 14 days, remove them.    Keep the wound clean.    Wash your hands before touching the wound.  Ensure that anyone assisting you in the care of your wound washes her/his hands before touching the wound. Good handwashing can decrease the risk of serious infection.    If you are unable to see your wound, have someone check the wound daily for redness, swelling,or drainage. A small amount of drainage is normal.    You may shower. Cover the wound with Tegaderm for 7-10 days.  Do not allow shower to beat directly on the wound.  Pat the wound dry. Do not rub.    No tub baths until the wound is well healed.  Usually 3-4 weeks.     If you develop redness, swelling, drainage, or temp 101 or greater, call our office at  [phone].    * No lifting, pushing or pulling greater than 5-10 pound (this is about a gallon of milk) for the first 6 weeks after surgery .  *No repetitive bending, twisting, or jarring activities for 6 weeks.  *No overhead work  *No aerobic or strenuous activity  *No activities with increased risk of falls  *You may move about your home as tolerated  *You may walk up and down stairs as tolerated  *You may increase your activity slowly over the next 6 weeks    WALKING PROGRAM: As you can tolerate, walk daily-start with 5-10 minutes of continuous walking. This is in addition to the walking that you do as part of your daily activities. Increase the time that you walk by 5 minutes every couple of days. Do not exceed 30-45 minutes of continuous walking until seen in follow-up. Walking is the best exercise after surgery.  **Listen to your body, if you find that you are more painful or fatigued, you may need to proceed more slowly.    **Do not smoke or expose yourself to second hand smoke. Cigarette smoke can delay healing and cause complications.     DRIVING:  We  recommend that you do not drive while taking medications for pain or muscle spasms. Always read and follow the advice on your prescription bottle. If you have questions, speak with your pharmacist.  We recommend that you do not drive while wearing a brace, as it could limit your range of motion.    WORK: If you plan to return to work before your 6 week post-op appointment, call and discuss with one of the nurses in the neurosurgery office.

## 2022-08-16 ENCOUNTER — TELEPHONE (OUTPATIENT)
Dept: FAMILY MEDICINE | Facility: CLINIC | Age: 72
End: 2022-08-16

## 2022-08-16 DIAGNOSIS — Z12.11 SCREEN FOR COLON CANCER: Primary | ICD-10-CM

## 2022-08-16 NOTE — TELEPHONE ENCOUNTER
"Reason for Call:  Other call back    Detailed comments: PT says he's \"way overdue\" for a colonoscopy, wants to know if Dr. Chino can put an order or referral in for him. Please call pt to advise.     Phone Number Patient can be reached at: Cell number on file:    Telephone Information:   Mobile [phone]       Best Time: ANY    Can we leave a detailed message on this number? YES    Call taken on 8/16/2022 at 3:41 PM by Barbara Miller      "

## 2022-09-02 ENCOUNTER — OFFICE VISIT (OUTPATIENT)
Dept: NEUROSURGERY | Facility: CLINIC | Age: 72
End: 2022-09-02
Payer: MEDICARE

## 2022-09-02 VITALS
SYSTOLIC BLOOD PRESSURE: 122 MMHG | BODY MASS INDEX: 32.02 KG/M2 | DIASTOLIC BLOOD PRESSURE: 56 MMHG | HEART RATE: 65 BPM | HEIGHT: 67 IN | OXYGEN SATURATION: 97 % | WEIGHT: 204 LBS

## 2022-09-02 DIAGNOSIS — M48.062 LUMBAR STENOSIS WITH NEUROGENIC CLAUDICATION: Primary | ICD-10-CM

## 2022-09-02 DIAGNOSIS — F10.21 ALCOHOL DEPENDENCE IN REMISSION (H): ICD-10-CM

## 2022-09-02 PROCEDURE — 99024 POSTOP FOLLOW-UP VISIT: CPT | Performed by: NURSE PRACTITIONER

## 2022-09-02 RX ORDER — OXYCODONE HYDROCHLORIDE 5 MG/1
5 TABLET ORAL 3 TIMES DAILY PRN
Qty: 15 TABLET | Refills: 0 | Status: SHIPPED | OUTPATIENT
Start: 2022-09-02 | End: 2023-01-27

## 2022-09-02 RX ORDER — METHYLPREDNISOLONE 4 MG
TABLET, DOSE PACK ORAL
Qty: 21 TABLET | Refills: 0 | Status: SHIPPED | OUTPATIENT
Start: 2022-09-02 | End: 2023-01-27

## 2022-09-02 RX ORDER — DOXYCYCLINE 100 MG/1
100 CAPSULE ORAL 2 TIMES DAILY
COMMUNITY
End: 2023-01-27

## 2022-09-02 NOTE — LETTER
"    9/2/2022         RE: Donovan Jane  [address] [address]        Dear Colleague,    Thank you for referring your patient, Donovan Jane, to the [facility]. Please see a copy of my visit note below.    Neurosurgery Progress Note  09/05/22    A/P: Donovan Jane is a 71 year old male who is minimally invasive lumbar decompression at lumbar 2 to lumbar three and lumbar three to lumbar four using a right-sided approach by Dr. Baker on 7/21. Big improvement in ability to stand, walk, etc. Still with persistent numbness in feet, groin, laura-area. Overall very happy with progress. Interested in PT. Can trial another medrol dose pack for the numbness that persists but is likely this will take time to resolve if at all.     Plan:  1. PT  2. Increase activity, add back to lifting restriction  3. Medrol dose pack  4. Tentative follow up six weeks, if symptoms improve and you are happy with your progress can cancel appt.   5. Small script of oxy to use while increasing activity.     S: Doing well recovery wise. Can stand and walk much better without pain. Prior to surgery, was unable to even walk to the mailbox without issues. He can now do that and then some. He notes persistent groin and laura area numbness, anterior thighs and feet - this is unchanged.     PMH: No interval change  PSH: No interval change    ROS:A full 14 point review of systems was otherwise completed and is negative aside from that mentioned above in the HPI    O:  /56   Pulse 65   Ht 1.702 m (5' 7\")   Wt 92.5 kg (204 lb)   SpO2 97%   BMI 31.95 kg/m    General: Awake, alert, NAD  HEENT: AT/NC, EOMI, face symmetric, oral mucosa moist and pink  Heart: RRR: Nonlabored respirations without accessory muscle use.  Abd: S, NT, ND  Neuro: Awake, alert, speech is clear and content is appropriate. MAEW x 4 with full strength in b/l UE and LE. Sensation is intact totemperature and LT.   Coordination: gait " okay   Musculoskeletal: Negative straight leg raise bl  Psych: Appropriatemood and affect, pleasant, cooperative, alert and oriented x 3  Skin: Incision C/D/I - well healed.       Labs: personally reviewed   Lab Results   Component Value Date     07/14/2022     06/20/2022    CO2 22 07/14/2022    CO2 25 06/20/2022    CO2 23 06/11/2022    BUN 14.9 07/14/2022    BUN 14 06/20/2022    BUN 12 06/11/2022     Recent Labs   Lab Test 07/14/22  1127 06/20/22  1119 06/11/22  0548 06/09/22  1503 05/22/22  0737 05/21/22  0712 05/20/22  1805 05/20/22  0930 05/20/22  0026 05/19/22  1802 07/23/18  1301   WBC 4.2 4.2 3.2* 3.8*  --   --  3.6*  --   --  4.3 7.6   HGB 13.0* 14.1 13.0* 14.7  --  12.9* 12.2* 13.6 12.9* 14.4 14.9   HCT 38.2* 41.5 38.3* 41.9  --   --  35.2*  --   --  40.3 42.1   MCV 97 95 95 94  --   --  95  --   --  92 94    344 205 262 287  --  259  --   --  289 278     Recent Labs   Lab Test 06/09/22  1503 05/19/22  1802 07/23/18  1301   INR 1.00 0.99 0.96   PTT  --  32  --          I personally visualized all imaging. None new.     Gloria Woodruff, CNP  [facility]  O: [phone]  P: [phone]            Again, thank you for allowing me to participate in the care of your patient.        Sincerely,        Gloria Woodruff NP

## 2022-09-02 NOTE — PATIENT INSTRUCTIONS
We can try a steroid dose pack for the numbness and tingling. Small prescription of pain medication while you are trying therapy.   Refer to PT, increase activity as tolerated, add back 5-10 pounds a week to lifting restriction to a level that is comfortable to you.    We will see you back in six weeks just to check in, can cancel if doing well.     The numbness may take some time to improve it at all, if anything worsens feel free to give us a call.     If you cannot find a PT location near to your home that is in our network, let us know and we can send an external referral.

## 2022-09-05 PROBLEM — F10.21 ALCOHOL DEPENDENCE IN REMISSION (H): Status: ACTIVE | Noted: 2022-09-05

## 2022-09-05 NOTE — PROGRESS NOTES
"Neurosurgery Progress Note  09/05/22    A/P: Donovan Jane is a 71 year old male who is minimally invasive lumbar decompression at lumbar 2 to lumbar three and lumbar three to lumbar four using a right-sided approach by Dr. Baker on 7/21. Big improvement in ability to stand, walk, etc. Still with persistent numbness in feet, groin, laura-area. Overall very happy with progress. Interested in PT. Can trial another medrol dose pack for the numbness that persists but is likely this will take time to resolve if at all.     Plan:  1. PT  2. Increase activity, add back to lifting restriction  3. Medrol dose pack  4. Tentative follow up six weeks, if symptoms improve and you are happy with your progress can cancel appt.   5. Small script of oxy to use while increasing activity.     S: Doing well recovery wise. Can stand and walk much better without pain. Prior to surgery, was unable to even walk to the mailbox without issues. He can now do that and then some. He notes persistent groin and laura area numbness, anterior thighs and feet - this is unchanged.     PMH: No interval change  PSH: No interval change    ROS:A full 14 point review of systems was otherwise completed and is negative aside from that mentioned above in the HPI    O:  /56   Pulse 65   Ht 1.702 m (5' 7\")   Wt 92.5 kg (204 lb)   SpO2 97%   BMI 31.95 kg/m    General: Awake, alert, NAD  HEENT: AT/NC, EOMI, face symmetric, oral mucosa moist and pink  Heart: RRR: Nonlabored respirations without accessory muscle use.  Abd: S, NT, ND  Neuro: Awake, alert, speech is clear and content is appropriate. MAEW x 4 with full strength in b/l UE and LE. Sensation is intact totemperature and LT.   Coordination: gait okay   Musculoskeletal: Negative straight leg raise bl  Psych: Appropriatemood and affect, pleasant, cooperative, alert and oriented x 3  Skin: Incision C/D/I - well healed.       Labs: personally reviewed   Lab Results   Component Value Date     " 07/14/2022     06/20/2022    CO2 22 07/14/2022    CO2 25 06/20/2022    CO2 23 06/11/2022    BUN 14.9 07/14/2022    BUN 14 06/20/2022    BUN 12 06/11/2022     Recent Labs   Lab Test 07/14/22  1127 06/20/22  1119 06/11/22  0548 06/09/22  1503 05/22/22  0737 05/21/22  0712 05/20/22  1805 05/20/22  0930 05/20/22  0026 05/19/22  1802 07/23/18  1301   WBC 4.2 4.2 3.2* 3.8*  --   --  3.6*  --   --  4.3 7.6   HGB 13.0* 14.1 13.0* 14.7  --  12.9* 12.2* 13.6 12.9* 14.4 14.9   HCT 38.2* 41.5 38.3* 41.9  --   --  35.2*  --   --  40.3 42.1   MCV 97 95 95 94  --   --  95  --   --  92 94    344 205 262 287  --  259  --   --  289 278     Recent Labs   Lab Test 06/09/22  1503 05/19/22 1802 07/23/18  1301   INR 1.00 0.99 0.96   PTT  --  32  --          I personally visualized all imaging. None new.     Gloria Woodruff CNP  [facility]  O: [phone]  P: [phone]

## 2022-09-14 DIAGNOSIS — F10.20 ALCOHOL USE DISORDER, SEVERE, DEPENDENCE (H): ICD-10-CM

## 2022-09-14 NOTE — TELEPHONE ENCOUNTER
Pending Prescriptions:                       Disp   Refills    naltrexone (DEPADE/REVIA) 50 MG tablet    30 tab*1            Sig: Take 1 tablet (50 mg) by mouth daily

## 2022-09-15 RX ORDER — NALTREXONE HYDROCHLORIDE 50 MG/1
50 TABLET, FILM COATED ORAL DAILY
Qty: 30 TABLET | Refills: 1 | Status: SHIPPED | OUTPATIENT
Start: 2022-09-15 | End: 2023-01-27

## 2022-10-01 ENCOUNTER — HEALTH MAINTENANCE LETTER (OUTPATIENT)
Age: 72
End: 2022-10-01

## 2022-10-03 ENCOUNTER — THERAPY VISIT (OUTPATIENT)
Dept: PHYSICAL THERAPY | Facility: CLINIC | Age: 72
End: 2022-10-03
Attending: NURSE PRACTITIONER
Payer: MEDICARE

## 2022-10-03 DIAGNOSIS — M54.50 LUMBAR SPINE PAIN: ICD-10-CM

## 2022-10-03 DIAGNOSIS — M48.062 LUMBAR STENOSIS WITH NEUROGENIC CLAUDICATION: ICD-10-CM

## 2022-10-03 PROCEDURE — 97161 PT EVAL LOW COMPLEX 20 MIN: CPT | Mod: GP | Performed by: PHYSICAL THERAPIST

## 2022-10-03 PROCEDURE — 97110 THERAPEUTIC EXERCISES: CPT | Mod: GP | Performed by: PHYSICAL THERAPIST

## 2022-10-03 NOTE — PROGRESS NOTES
[facility]    OUTPATIENT Physical Therapy ORTHOPEDIC EVALUATION  PLAN OF TREATMENT FOR OUTPATIENT REHABILITATION  (COMPLETE FOR INITIAL CLAIMS ONLY)  Patient's Last Name, First Name, M.I.  YOB: 1950  Donovan Jane    Provider s Name:  [facility]   Medical Record No.  [id number]   Start of Care Date:  10/03/22   Onset Date:  07/21/22    Type:     _X__PT   ___OT Medical Diagnosis:  Lumbar stenosis with neurogenic claudication      Treatment Diagnosis:  Lumbar spine pain (Lumbar decompression)        Goals:     10/03/22 0500   Body Part   Goals listed below are for Lumbar   Goal #1   Goal #1 standing   Previous Functional Level No restrictions   Current Functional Level Minutes patient can stand   Performance level 3 mins   STG Target Performance Minutes patient will be able to stand   Performance level 7 with 3/10 pain   Rationale for meal preparation   Due date 10/24/22   LTG Target Performance Minutes patient will be able to stand   Performance Level 10 with 1-2/10 pain   Rationale for meal preparation   Due date 12/12/22   Goal #2   Goal #2 ambulation   Previous Functional Level No restrictions   Current Functional Level Blocks patient can walk   Performance Level 1   STG Target Performance Blocks patient will be able to  walk   Performance Level 1 with 3/10 pain   Rationale for safe household ambulation;for safe outdoor household ambulation;for safe community ambulation   Due Date 10/24/22    LTG Target Performance  Blocks patient will be able to walk   Performance Level 4   Rationale for safe household ambulation;for safe outdoor household ambulation;for safe community ambulation   Due Date 12/12/22       Therapy Frequency:  1 time per week  Predicted Duration of Therapy Intervention:  10 weeks    Arturo Jeff PT                 I CERTIFY THE NEED FOR THESE  SERVICES FURNISHED UNDER        THIS PLAN OF TREATMENT AND WHILE UNDER MY CARE     (Physician attestation of this document indicates review and certification of the therapy plan).                     Certification Date From:  10/03/22   Certification Date To:  12/12/22    Referring Provider:  Gloria Woodruff    Initial Assessment        See Epic Evaluation SOC Date: 10/03/22

## 2022-10-03 NOTE — PROGRESS NOTES
Physical Therapy Initial Evaluation  Subjective:  The history is provided by the patient.   Therapist Generated HPI Evaluation  Problem details: Had spinal decompression surgery and not a lot of change. Still having numbness and pain. Able to walk a little further (1/2 block), was walking to the mailbox prior to surgery. Has numbness from knees down. Has diabetes as well. Has had 6 spinal surgeries (laminectomy L4-L5, fusion) in his life. Not sure what most of the surgeries were for or at what levels..         Type of problem:  Lumbar.    This is a chronic condition.      Patient reports pain:  Lumbar spine right, central lumbar spine and lumbar spine left.    Pain is worse in the P.M..  Since onset symptoms are gradually improving.  Associated symptoms:  Numbness, loss of strength and loss of motion/stiffness. Symptoms are exacerbated by walking and standing        Barriers include:  None as reported by patient.    Patient Health History  Donovan Jane being seen for Rehab.     Problem began: 10/3/1982.   Problem occurred: Work injury   Pain is reported as 3/10 on pain scale.  General health as reported by patient is fair.  Pertinent medical history includes: chemical dependency, concussions/dizziness, depression, diabetes, high blood pressure, numbness/tingling, sleep disorder/apnea and smoking.   Red flags:  Changes in bowel and bladder habits, foot drop and severe dizziness.         Current medications:  Anti-depressants, bone density and high blood pressure medication.    Current occupation is Disabled.                                       Objective:    Gait:  Limited lumbar rotation and arm swing. Mild DF weakness on right. Limited stride length. Significant toe out on right.                   Lumbar/SI Evaluation    Lumbar Myotomes:    T12-L3 (Hip Flex):  Left: 4    Right: 4  L2-4 (Quads):  Left:  5    Right:  5  L4 (Ankle DF):  Left:  5    Right:  4+  L5 (Great Toe Ext): Left: 5    Right: 5   S1 (Toe  Raise):  Left: 5    Right: 5  Lumbar DTR's:    L4 (Quad):  Left:  0   Right:  0      Lumbar Dermtomes:  Lumbar dermatomes: Numbness from knees to feet B.                  Lumbar Palpation:  Palpation (lumbar): Surgical incision well healed with no signs of infection noted. Previous surgical incisions from L1-L5.                                                         General     ROS    Assessment/Plan:    Patient is a 72 year old male with lumbar complaints.    Patient has the following significant findings with corresponding treatment plan.                Diagnosis 1:  Lumbar spine pain  Pain -  manual therapy, self management, education and home program  Decreased ROM/flexibility - manual therapy, therapeutic exercise, therapeutic activity and home program  Decreased joint mobility - manual therapy, therapeutic exercise, therapeutic activity and home program  Decreased strength - therapeutic exercise, therapeutic activities and home program  Decreased function - therapeutic activities and home program    Therapy Evaluation Codes:     Cumulative Therapy Evaluation is: Low complexity.    Previous and current functional limitations:  (See Goal Flow Sheet for this information)    Short term and Long term goals: (See Goal Flow Sheet for this information)     Communication ability:  Patient appears to be able to clearly communicate and understand verbal and written communication and follow directions correctly.  Treatment Explanation - The following has been discussed with the patient:   RX ordered/plan of care  Anticipated outcomes  Possible risks and side effects  This patient would benefit from PT intervention to resume normal activities.   Rehab potential is good.    Frequency:  1 X week, once daily  Duration:  for 10 weeks  Discharge Plan:  Achieve all LTG.  Independent in home treatment program.  Reach maximal therapeutic benefit.    Please refer to the daily flowsheet for treatment today, total treatment time and  time spent performing 1:1 timed codes.

## 2022-12-19 DIAGNOSIS — M54.50 CHRONIC BILATERAL LOW BACK PAIN, UNSPECIFIED WHETHER SCIATICA PRESENT: Primary | ICD-10-CM

## 2022-12-19 DIAGNOSIS — G89.29 CHRONIC BILATERAL LOW BACK PAIN, UNSPECIFIED WHETHER SCIATICA PRESENT: Primary | ICD-10-CM

## 2022-12-19 RX ORDER — CYCLOBENZAPRINE HCL 5 MG
5 TABLET ORAL 3 TIMES DAILY PRN
Qty: 30 TABLET | Refills: 1 | Status: SHIPPED | OUTPATIENT
Start: 2022-12-19 | End: 2023-01-27

## 2023-01-08 ENCOUNTER — MEDICAL CORRESPONDENCE (OUTPATIENT)
Dept: HEALTH INFORMATION MANAGEMENT | Facility: CLINIC | Age: 73
End: 2023-01-08

## 2023-01-16 ENCOUNTER — MEDICAL CORRESPONDENCE (OUTPATIENT)
Dept: HEALTH INFORMATION MANAGEMENT | Facility: CLINIC | Age: 73
End: 2023-01-16

## 2023-01-19 ENCOUNTER — TELEPHONE (OUTPATIENT)
Dept: FAMILY MEDICINE | Facility: CLINIC | Age: 73
End: 2023-01-19
Payer: MEDICARE

## 2023-01-19 NOTE — TELEPHONE ENCOUNTER
I have the forms from [facility] that I have completed and placed in outbox.  Please inform patient that these forms require documentation of a visit in the last 6 months.  This also requires patient to be on insulin to get patricia.  I last saw patient in July 2022.  He had new onset of diabetes.  He is currently not on insulin and does not qualify for patricia per se.    At this time, I recommend that he schedule a follow-up appointment for recheck of his diabetes and further management.    Bereket Chino MD

## 2023-01-19 NOTE — TELEPHONE ENCOUNTER
General Call      Reason for Call:  [employer]    What are your questions or concerns:  Sent over a form for provider about patient's diabetes condition on 01/16/23 and again on 01/19/23  They are requesting the supporting documents/form requested faxed asap to BOTH   Fax numbers below.    Fax: [phone]  Fax: [phone]      Please advise.

## 2023-01-19 NOTE — TELEPHONE ENCOUNTER
Oh, it looks like he's scheduled with you on the 27th. We can just hold that paperwork until then.

## 2023-01-27 ENCOUNTER — OFFICE VISIT (OUTPATIENT)
Dept: FAMILY MEDICINE | Facility: CLINIC | Age: 73
End: 2023-01-27
Payer: MEDICARE

## 2023-01-27 ENCOUNTER — TELEPHONE (OUTPATIENT)
Dept: FAMILY MEDICINE | Facility: CLINIC | Age: 73
End: 2023-01-27

## 2023-01-27 VITALS
HEART RATE: 104 BPM | DIASTOLIC BLOOD PRESSURE: 92 MMHG | RESPIRATION RATE: 20 BRPM | HEIGHT: 66 IN | OXYGEN SATURATION: 97 % | TEMPERATURE: 99.5 F | BODY MASS INDEX: 33.65 KG/M2 | SYSTOLIC BLOOD PRESSURE: 142 MMHG | WEIGHT: 209.4 LBS

## 2023-01-27 DIAGNOSIS — E87.5 SERUM POTASSIUM ELEVATED: Primary | ICD-10-CM

## 2023-01-27 DIAGNOSIS — F32.A DEPRESSION, UNSPECIFIED DEPRESSION TYPE: ICD-10-CM

## 2023-01-27 DIAGNOSIS — F17.210 CIGARETTE NICOTINE DEPENDENCE WITHOUT COMPLICATION: ICD-10-CM

## 2023-01-27 DIAGNOSIS — R19.5 OCCULT BLOOD POSITIVE STOOL: ICD-10-CM

## 2023-01-27 DIAGNOSIS — E11.65 POORLY CONTROLLED DIABETES MELLITUS (H): ICD-10-CM

## 2023-01-27 DIAGNOSIS — R79.89 LOW VITAMIN B12 LEVEL: ICD-10-CM

## 2023-01-27 DIAGNOSIS — Z00.00 ENCOUNTER FOR MEDICARE ANNUAL WELLNESS EXAM: Primary | ICD-10-CM

## 2023-01-27 DIAGNOSIS — R79.0 LOW MAGNESIUM LEVEL: ICD-10-CM

## 2023-01-27 DIAGNOSIS — F10.21 ALCOHOL DEPENDENCE IN REMISSION (H): ICD-10-CM

## 2023-01-27 DIAGNOSIS — I10 HYPERTENSION, UNSPECIFIED TYPE: ICD-10-CM

## 2023-01-27 LAB
ALBUMIN SERPL BCG-MCNC: 4.6 G/DL (ref 3.5–5.2)
ALP SERPL-CCNC: 96 U/L (ref 40–129)
ALT SERPL W P-5'-P-CCNC: 14 U/L (ref 10–50)
ANION GAP SERPL CALCULATED.3IONS-SCNC: 15 MMOL/L (ref 7–15)
AST SERPL W P-5'-P-CCNC: 22 U/L (ref 10–50)
BASOPHILS # BLD AUTO: 0 10E3/UL (ref 0–0.2)
BASOPHILS NFR BLD AUTO: 1 %
BILIRUB SERPL-MCNC: 0.2 MG/DL
BUN SERPL-MCNC: 19.5 MG/DL (ref 8–23)
CALCIUM SERPL-MCNC: 10.7 MG/DL (ref 8.8–10.2)
CHLORIDE SERPL-SCNC: 94 MMOL/L (ref 98–107)
CHOLEST SERPL-MCNC: 204 MG/DL
CREAT SERPL-MCNC: 1.29 MG/DL (ref 0.67–1.17)
DEPRECATED HCO3 PLAS-SCNC: 22 MMOL/L (ref 22–29)
EOSINOPHIL # BLD AUTO: 0.2 10E3/UL (ref 0–0.7)
EOSINOPHIL NFR BLD AUTO: 3 %
ERYTHROCYTE [DISTWIDTH] IN BLOOD BY AUTOMATED COUNT: 13.7 % (ref 10–15)
GFR SERPL CREATININE-BSD FRML MDRD: 59 ML/MIN/1.73M2
GLUCOSE SERPL-MCNC: 318 MG/DL (ref 70–99)
HBA1C MFR BLD: 10.7 % (ref 0–5.6)
HCT VFR BLD AUTO: 43.2 % (ref 40–53)
HDLC SERPL-MCNC: 46 MG/DL
HGB BLD-MCNC: 14.1 G/DL (ref 13.3–17.7)
IMM GRANULOCYTES # BLD: 0 10E3/UL
IMM GRANULOCYTES NFR BLD: 0 %
LDLC SERPL CALC-MCNC: 122 MG/DL
LYMPHOCYTES # BLD AUTO: 1.2 10E3/UL (ref 0.8–5.3)
LYMPHOCYTES NFR BLD AUTO: 20 %
MCH RBC QN AUTO: 30.1 PG (ref 26.5–33)
MCHC RBC AUTO-ENTMCNC: 32.6 G/DL (ref 31.5–36.5)
MCV RBC AUTO: 92 FL (ref 78–100)
MONOCYTES # BLD AUTO: 0.6 10E3/UL (ref 0–1.3)
MONOCYTES NFR BLD AUTO: 10 %
NEUTROPHILS # BLD AUTO: 3.9 10E3/UL (ref 1.6–8.3)
NEUTROPHILS NFR BLD AUTO: 66 %
NONHDLC SERPL-MCNC: 158 MG/DL
PLATELET # BLD AUTO: 341 10E3/UL (ref 150–450)
POTASSIUM SERPL-SCNC: 5.9 MMOL/L (ref 3.4–5.3)
PROT SERPL-MCNC: 8.3 G/DL (ref 6.4–8.3)
RBC # BLD AUTO: 4.69 10E6/UL (ref 4.4–5.9)
SODIUM SERPL-SCNC: 131 MMOL/L (ref 136–145)
TRIGL SERPL-MCNC: 181 MG/DL
TSH SERPL DL<=0.005 MIU/L-ACNC: 3.03 UIU/ML (ref 0.3–4.2)
WBC # BLD AUTO: 5.9 10E3/UL (ref 4–11)

## 2023-01-27 PROCEDURE — G0439 PPPS, SUBSEQ VISIT: HCPCS | Performed by: FAMILY MEDICINE

## 2023-01-27 PROCEDURE — 0124A COVID-19 VACCINE BIVALENT BOOSTER 12+ (PFIZER): CPT | Performed by: FAMILY MEDICINE

## 2023-01-27 PROCEDURE — 83036 HEMOGLOBIN GLYCOSYLATED A1C: CPT | Performed by: FAMILY MEDICINE

## 2023-01-27 PROCEDURE — 36415 COLL VENOUS BLD VENIPUNCTURE: CPT | Performed by: FAMILY MEDICINE

## 2023-01-27 PROCEDURE — 90662 IIV NO PRSV INCREASED AG IM: CPT | Performed by: FAMILY MEDICINE

## 2023-01-27 PROCEDURE — 91312 COVID-19 VACCINE BIVALENT BOOSTER 12+ (PFIZER): CPT | Performed by: FAMILY MEDICINE

## 2023-01-27 PROCEDURE — 99214 OFFICE O/P EST MOD 30 MIN: CPT | Mod: 25 | Performed by: FAMILY MEDICINE

## 2023-01-27 PROCEDURE — 80050 GENERAL HEALTH PANEL: CPT | Performed by: FAMILY MEDICINE

## 2023-01-27 PROCEDURE — 80061 LIPID PANEL: CPT | Performed by: FAMILY MEDICINE

## 2023-01-27 PROCEDURE — G0008 ADMIN INFLUENZA VIRUS VAC: HCPCS | Performed by: FAMILY MEDICINE

## 2023-01-27 RX ORDER — LANOLIN ALCOHOL/MO/W.PET/CERES
1000 CREAM (GRAM) TOPICAL DAILY
Qty: 100 TABLET | Refills: 3 | Status: SHIPPED | OUTPATIENT
Start: 2023-01-27

## 2023-01-27 RX ORDER — UREA 10 %
500 LOTION (ML) TOPICAL 2 TIMES DAILY
Qty: 180 TABLET | Refills: 1 | Status: SHIPPED | OUTPATIENT
Start: 2023-01-27 | End: 2023-06-27

## 2023-01-27 RX ORDER — LISINOPRIL 10 MG/1
10 TABLET ORAL DAILY
Qty: 90 TABLET | Refills: 3 | Status: SHIPPED | OUTPATIENT
Start: 2023-01-27

## 2023-01-27 ASSESSMENT — ENCOUNTER SYMPTOMS
DIZZINESS: 1
CONSTIPATION: 0
CHILLS: 0
NERVOUS/ANXIOUS: 1
SHORTNESS OF BREATH: 1
ABDOMINAL PAIN: 0
HEMATURIA: 0
HEMATOCHEZIA: 0
COUGH: 1

## 2023-01-27 ASSESSMENT — PATIENT HEALTH QUESTIONNAIRE - PHQ9
SUM OF ALL RESPONSES TO PHQ QUESTIONS 1-9: 12
SUM OF ALL RESPONSES TO PHQ QUESTIONS 1-9: 12
10. IF YOU CHECKED OFF ANY PROBLEMS, HOW DIFFICULT HAVE THESE PROBLEMS MADE IT FOR YOU TO DO YOUR WORK, TAKE CARE OF THINGS AT HOME, OR GET ALONG WITH OTHER PEOPLE: SOMEWHAT DIFFICULT

## 2023-01-27 ASSESSMENT — ACTIVITIES OF DAILY LIVING (ADL): CURRENT_FUNCTION: NO ASSISTANCE NEEDED

## 2023-01-27 NOTE — PROGRESS NOTES
ASSESSMENT / PLAN:       ICD-10-CM    1. Encounter for Medicare annual wellness exam  Z00.00 CBC with platelets and differential     Lipid panel reflex to direct LDL Fasting     CBC with platelets and differential     Lipid panel reflex to direct LDL Fasting      2. Diabetes mellitus, new onset (H)  E11.9 Adult Eye  Referral     HEMOGLOBIN A1C     insulin glargine (LANTUS PEN) 100 UNIT/ML pen     TSH with free T4 reflex     HEMOGLOBIN A1C     TSH with free T4 reflex      3. Low magnesium level  R79.0 magnesium gluconate (MAGONATE) 500 (27 Mg) MG tablet      4. Hypertension, unspecified type  I10 lisinopril (ZESTRIL) 10 MG tablet     Comprehensive metabolic panel (BMP + Alb, Alk Phos, ALT, AST, Total. Bili, TP)     Comprehensive metabolic panel (BMP + Alb, Alk Phos, ALT, AST, Total. Bili, TP)      5. Occult blood positive stool  R19.5 Colonoscopy Screening  Referral      6. Low vitamin B12 level  E53.8 cyanocobalamin (VITAMIN B-12) 1000 MCG tablet      7. Poorly controlled diabetes mellitus (H)  E11.65 AMB Adult Diabetes Educator Referral      8. Alcohol dependence in remission (H)  F10.21       9. Cigarette nicotine dependence without complication  F17.210       10. Depression, unspecified depression type  F32.A Adult Mental Health  Referral        Medication making: Patient with hypertension, hyperlipidemia, diabetes, history of alcohol use presents today for follow-up.  He informs me that he has now quit drinking.  He does continue to smoke.  At the time of documentation, his diabetes appears to have really worsened with an A1c of greater than 10.  Urgent referral to diabetes educator is being done.  Other labs are reasonable.  It appears he may not be taking his atorvastatin regularly as lipid panel shows inadequate control.  His blood pressure is also borderline high.  At this time I would like him to see an diabetes educator.    I will see him back in 3 months  "time.    COUNSELING:  Reviewed preventive health counseling, as reflected in patient instructions       Regular exercise       Healthy diet/nutrition       Vision screening       Hearing screening       Alcohol Use        Colon cancer screening      BMI:   Estimated body mass index is 33.54 kg/m  as calculated from the following:    Height as of this encounter: 1.683 m (5' 6.25\").    Weight as of this encounter: 95 kg (209 lb 6.4 oz).   Weight management plan: Discussed healthy diet and exercise guidelines      He reports that he has been smoking cigarettes. He has a 40.00 pack-year smoking history. He has never used smokeless tobacco.  Nicotine/Tobacco Cessation Plan:   Self help information given to patient      Appropriate preventive services were discussed with this patient, including applicable screening as appropriate for cardiovascular disease, diabetes, osteopenia/osteoporosis, and glaucoma.  As appropriate for age/gender, discussed screening for colorectal cancer, prostate cancer, breast cancer, and cervical cancer. Checklist reviewing preventive services available has been given to the patient.    SUBJECTIVE:   Donovan is a 72 year old who presents for Preventive Visit.  Chief Complaint   Patient presents with     Physical     Trouble sleeping-goes for 2-3 days without sleep.  Will sleep one night and then cannot sleep again.  Has had a sore on his bum for about 6 months, also caught a cold and has a cough.  He will usually us an inhaler, but has not one.  Back surgery in august and things are worse now.  Has trouble controlling his bowels. Also has itching, has used hydroxyzine in the past and it has worked well.        Patient has been advised of split billing requirements and indicates understanding: Yes  Are you in the first 12 months of your Medicare coverage?  No    Healthy Habits:     In general, how would you rate your overall health?  Fair    Frequency of exercise:  None    Do you usually eat at " "least 4 servings of fruit and vegetables a day, include whole grains    & fiber and avoid regularly eating high fat or \"junk\" foods?  No    Taking medications regularly:  Yes    Medication side effects:  Lightheadedness    Ability to successfully perform activities of daily living:  No assistance needed    Home Safety:  No safety concerns identified    Hearing Impairment:  Difficulty following a conversation in a noisy restaurant or crowded room, feel that people are mumbling or not speaking clearly and need to ask people to speak up or repeat themselves    In the past 6 months, have you been bothered by leaking of urine?  No    In general, how would you rate your overall mental or emotional health?  Fair      PHQ-2 Total Score: 4    Additional concerns today:  No      Have you ever done Advance Care Planning? (For example, a Health Directive, POLST, or a discussion with a medical provider or your loved ones about your wishes): No, advance care planning information given to patient to review.  Patient declined advance care planning discussion at this time.       Fall risk  Fallen 2 or more times in the past year?: Yes  Any fall with injury in the past year?: No    Cognitive Screening   1) Repeat 3 items (Leader, Season, Table)    2) Clock draw: NORMAL  3) 3 item recall: Recalls 2 objects   Results: NORMAL clock, 1-2 items recalled: COGNITIVE IMPAIRMENT LESS LIKELY    Mini-CogTM Copyright S [employer]. Licensed by the author for use in [facility]; reprinted with permission ([email or web address]). All rights reserved.          Reviewed and updated as needed this visit by clinical staff     Meds              Reviewed and updated as needed this visit by Provider                 Social History     Tobacco Use     Smoking status: Every Day     Packs/day: 1.00     Years: 40.00     Pack years: 40.00     Types: Cigarettes     Smokeless tobacco: Never   Substance Use Topics     Alcohol use: Yes     If you drink alcohol do " you typically have >3 drinks per day or >7 drinks per week? Yes but tells me that he has now quit      Alcohol Use 1/27/2023   Prescreen: >3 drinks/day or >7 drinks/week? No       Current providers sharing in care for this patient include:   Patient Care Team:  Bereket Chino MD as PCP - General (Family Medicine)  Bereket Chino MD as Assigned PCP  Kwaku Marie DO as MD (Neurology)  Sherie Robertson DPM as Assigned Surgical Provider  Gloria Woodruff NP as Assigned Neuroscience Provider  Rashawn Winston MD as MD (Ophthalmology)    The following health maintenance items are reviewed in Epic and correct as of today:  Health Maintenance   Topic Date Due     ZOSTER IMMUNIZATION (1 of 2) Never done     EYE EXAM  09/01/2022     COLORECTAL CANCER SCREENING  05/19/2023     LUNG CANCER SCREENING  05/19/2023     MICROALBUMIN  06/20/2023     URINE DRUG SCREEN  06/20/2023     ANNUAL REVIEW OF HM ORDERS  06/20/2023     DIABETIC FOOT EXAM  07/05/2023     A1C  07/27/2023     NICOTINE/TOBACCO CESSATION COUNSELING Q 1 YR  01/27/2024     MEDICARE ANNUAL WELLNESS VISIT  01/27/2024     BMP  01/27/2024     LIPID  01/27/2024     FALL RISK ASSESSMENT  01/27/2024     ADVANCE CARE PLANNING  01/27/2028     DTAP/TDAP/TD IMMUNIZATION (3 - Td or Tdap) 05/17/2029     HEPATITIS C SCREENING  Completed     PHQ-2 (once per calendar year)  Completed     INFLUENZA VACCINE  Completed     Pneumococcal Vaccine: 65+ Years  Completed     AORTIC ANEURYSM SCREENING (SYSTEM ASSIGNED)  Completed     COVID-19 Vaccine  Completed     IPV IMMUNIZATION  Aged Out     MENINGITIS IMMUNIZATION  Aged Out     Labs reviewed in EPIC  BP Readings from Last 3 Encounters:   01/27/23 (!) 142/92   09/02/22 122/56   07/29/22 137/85    Wt Readings from Last 3 Encounters:   01/27/23 95 kg (209 lb 6.4 oz)   09/02/22 92.5 kg (204 lb)   07/21/22 92.9 kg (204 lb 12.8 oz)                  Patient Active Problem List   Diagnosis     Alcohol-induced acute  pancreatitis without infection or necrosis     Anxiety     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)     HTN (hypertension)     Insomnia     Nicotine dependence, uncomplicated     Obesity (BMI 30-39.9)     Type 2 diabetes mellitus (H)     Hyponatremia     Syncope, unspecified syncope type     Urinary retention     Gastrointestinal hemorrhage, unspecified gastrointestinal hemorrhage type     Hypomagnesemia     Lightheadedness     Acute cystitis without hematuria     Aspiration pneumonia (H)     Confusion     Constipation     Debility     Diabetes mellitus, new onset (H)     Prolonged Q-T interval on ECG     Vitamin D deficiency     Tobacco use disorder     Toxic encephalopathy     Tubulovillous adenoma of colon     Spinal stenosis, unspecified region other than cervical     Low back pain     Gout     Dyslipidemia     Depression     Alcohol dependence in remission (H)     Lumbar spine pain     Past Surgical History:   Procedure Laterality Date     BACK SURGERY      states he has had 5 back surgeries     CARPAL TUNNEL RELEASE RT/LT Right      LAMINECTOMY LUMBAR MINIMALLY INVASIVE TWO LEVELS Right 7/21/2022    Procedure: minimally invasive lumbar decompression at lumbar 2 to lumbar three and lumbar three to lumbar four using a right-sided approach;  Surgeon: Kwaku Baker MD;  Location: [facility]     SINUS SURGERY Bilateral        Social History     Tobacco Use     Smoking status: Every Day     Packs/day: 1.00     Years: 40.00     Pack years: 40.00     Types: Cigarettes     Smokeless tobacco: Never   Substance Use Topics     Alcohol use: Yes     Family History   Problem Relation Age of Onset     Diabetes Mother          Current Outpatient Medications   Medication Sig Dispense Refill     atorvastatin (LIPITOR) 40 MG tablet [ATORVASTATIN (LIPITOR) 40 MG TABLET] Take 40 mg by mouth daily.       cyanocobalamin (VITAMIN B-12) 1000 MCG tablet Take 1 tablet (1,000 mcg) by mouth daily Gummy 100  "tablet 3     DULoxetine (CYMBALTA) 60 MG capsule Take 120 mg by mouth daily       insulin glargine (LANTUS PEN) 100 UNIT/ML pen Inject 15 Units Subcutaneous every morning 15 mL 3     lisinopril (ZESTRIL) 10 MG tablet Take 1 tablet (10 mg) by mouth daily 90 tablet 3     magnesium gluconate (MAGONATE) 500 (27 Mg) MG tablet Take 1 tablet (500 mg) by mouth 2 times daily 180 tablet 1     Pneumonia Vaccine:For adults 65 years or older who do not have an immunocompromising condition, cerebrospinal fluid leak, or cochlear implant and want to receive PPSV23 ONLY: Administer 1 dose of PPSV23. Anyone who received any doses of PPSV23 before age 65 should receive 1 final dose of the vaccine at age 65 or older. Administer this last dose at least 5 years after the prior PPSV23 dose.        Review of Systems   Constitutional: Negative for chills.   HENT: Positive for congestion and hearing loss.    Respiratory: Positive for cough and shortness of breath.    Cardiovascular: Negative for chest pain.   Gastrointestinal: Negative for abdominal pain, constipation and hematochezia.   Genitourinary: Positive for impotence. Negative for hematuria and penile discharge.   Neurological: Positive for dizziness.   Psychiatric/Behavioral: The patient is nervous/anxious.          OBJECTIVE:   BP (!) 142/92   Pulse 104   Temp 99.5  F (37.5  C) (Oral)   Resp 20   Ht 1.683 m (5' 6.25\")   Wt 95 kg (209 lb 6.4 oz)   SpO2 97%   BMI 33.54 kg/m   Estimated body mass index is 33.54 kg/m  as calculated from the following:    Height as of this encounter: 1.683 m (5' 6.25\").    Weight as of this encounter: 95 kg (209 lb 6.4 oz).  Physical Exam  GENERAL: healthy, alert and no distress  NECK: no adenopathy, no asymmetry, masses, or scars and thyroid normal to palpation  RESP: lungs clear to auscultation - no rales, rhonchi or wheezes  CV: regular rate and rhythm, normal S1 S2, no S3 or S4, no murmur, click or rub, no peripheral edema and peripheral " pulses strong  ABDOMEN: soft, nontender, no hepatosplenomegaly, no masses and bowel sounds normal  MS: no gross musculoskeletal defects noted, no edema  SKIN: Healed  Wound around the rectal area.  There is no opening or drainage.  Diagnostic Test Results:  Labs reviewed in Epic  Results for orders placed or performed in visit on 01/27/23   HEMOGLOBIN A1C     Status: Abnormal   Result Value Ref Range    Hemoglobin A1C 10.7 (H) 0.0 - 5.6 %   Lipid panel reflex to direct LDL Fasting     Status: Abnormal   Result Value Ref Range    Cholesterol 204 (H) <200 mg/dL    Triglycerides 181 (H) <150 mg/dL    Direct Measure HDL 46 >=40 mg/dL    LDL Cholesterol Calculated 122 (H) <=100 mg/dL    Non HDL Cholesterol 158 (H) <130 mg/dL    Narrative    Cholesterol  Desirable:  <200 mg/dL    Triglycerides  Normal:  Less than 150 mg/dL  Borderline High:  150-199 mg/dL  High:  200-499 mg/dL  Very High:  Greater than or equal to 500 mg/dL    Direct Measure HDL  Female:  Greater than or equal to 50 mg/dL   Male:  Greater than or equal to 40 mg/dL    LDL Cholesterol  Desirable:  <100mg/dL  Above Desirable:  100-129 mg/dL   Borderline High:  130-159 mg/dL   High:  160-189 mg/dL   Very High:  >= 190 mg/dL    Non HDL Cholesterol  Desirable:  130 mg/dL  Above Desirable:  130-159 mg/dL  Borderline High:  160-189 mg/dL  High:  190-219 mg/dL  Very High:  Greater than or equal to 220 mg/dL   Comprehensive metabolic panel (BMP + Alb, Alk Phos, ALT, AST, Total. Bili, TP)     Status: Abnormal   Result Value Ref Range    Sodium 131 (L) 136 - 145 mmol/L    Potassium 5.9 (H) 3.4 - 5.3 mmol/L    Chloride 94 (L) 98 - 107 mmol/L    Carbon Dioxide (CO2) 22 22 - 29 mmol/L    Anion Gap 15 7 - 15 mmol/L    Urea Nitrogen 19.5 8.0 - 23.0 mg/dL    Creatinine 1.29 (H) 0.67 - 1.17 mg/dL    Calcium 10.7 (H) 8.8 - 10.2 mg/dL    Glucose 318 (H) 70 - 99 mg/dL    Alkaline Phosphatase 96 40 - 129 U/L    AST 22 10 - 50 U/L    ALT 14 10 - 50 U/L    Protein Total 8.3 6.4 -  8.3 g/dL    Albumin 4.6 3.5 - 5.2 g/dL    Bilirubin Total 0.2 <=1.2 mg/dL    GFR Estimate 59 (L) >60 mL/min/1.73m2   TSH with free T4 reflex     Status: Normal   Result Value Ref Range    TSH 3.03 0.30 - 4.20 uIU/mL   CBC with platelets and differential     Status: None   Result Value Ref Range    WBC Count 5.9 4.0 - 11.0 10e3/uL    RBC Count 4.69 4.40 - 5.90 10e6/uL    Hemoglobin 14.1 13.3 - 17.7 g/dL    Hematocrit 43.2 40.0 - 53.0 %    MCV 92 78 - 100 fL    MCH 30.1 26.5 - 33.0 pg    MCHC 32.6 31.5 - 36.5 g/dL    RDW 13.7 10.0 - 15.0 %    Platelet Count 341 150 - 450 10e3/uL    % Neutrophils 66 %    % Lymphocytes 20 %    % Monocytes 10 %    % Eosinophils 3 %    % Basophils 1 %    % Immature Granulocytes 0 %    Absolute Neutrophils 3.9 1.6 - 8.3 10e3/uL    Absolute Lymphocytes 1.2 0.8 - 5.3 10e3/uL    Absolute Monocytes 0.6 0.0 - 1.3 10e3/uL    Absolute Eosinophils 0.2 0.0 - 0.7 10e3/uL    Absolute Basophils 0.0 0.0 - 0.2 10e3/uL    Absolute Immature Granulocytes 0.0 <=0.4 10e3/uL   CBC with platelets and differential     Status: None    Narrative    The following orders were created for panel order CBC with platelets and differential.  Procedure                               Abnormality         Status                     ---------                               -----------         ------                     CBC with platelets and d...[[id number]]                      Final result                 Please view results for these tests on the individual orders.       Reviewed patients plan of care and provided an AVS. The Basic Care Plan (routine screening as documented in Health Maintenance) for Donovan meets the Care Plan requirement. This Care Plan has been established and reviewed with the Patient.          Bereket Chino MD  [facility]    Identified Health Risks:  Answers for HPI/ROS submitted by the patient on 1/27/2023  If you checked off any problems, how difficult have these  problems made it for you to do your work, take care of things at home, or get along with other people?: Somewhat difficult  PHQ9 TOTAL SCORE: 12        The patient was provided with suggestions to help him develop a healthy physical lifestyle.  He is at risk for lack of exercise and has been provided with information to increase physical activity for the benefit of his well-being.  The patient was counseled and encouraged to consider modifying their diet and eating habits. He was provided with information on recommended healthy diet options.  The patient was provided with written information regarding signs of hearing loss.  The patient was provided with suggestions to help him develop a healthy emotional lifestyle.  The patient s PHQ-9 score is consistent with moderate depression. He was provided with information regarding depression and was advised to schedule a follow up appointment in few weeks to further address this issue.  He is at risk for falling and has been provided with information to reduce the risk of falling at home.

## 2023-01-27 NOTE — LETTER
January 30, 2023      Donovan Jane  [address] [address]        Dear ,    We are writing to inform you of your test results.    Your diabetes has really worsened.  It is now uncontrolled.  I am referring you urgently to diabetes educator to initiate you on insulin as your potassium is also high because of very high glucose.   In addition, it appears that you have not been taking your cholesterol-lowering medication atorvastatin.   We will start this after we improve your diabetes.   Your kidney functions are also slightly down and we need to keep a check on this.   At this time your fasting glucose number is 318.   I strongly encouraged you to keep your appointment with the diabetes educator.     Bereket Chino MD       Resulted Orders   HEMOGLOBIN A1C   Result Value Ref Range    Hemoglobin A1C 10.7 (H) 0.0 - 5.6 %      Comment:      Normal <5.7%   Prediabetes 5.7-6.4%    Diabetes 6.5% or higher     Note: Adopted from ADA consensus guidelines.   Lipid panel reflex to direct LDL Fasting   Result Value Ref Range    Cholesterol 204 (H) <200 mg/dL    Triglycerides 181 (H) <150 mg/dL    Direct Measure HDL 46 >=40 mg/dL    LDL Cholesterol Calculated 122 (H) <=100 mg/dL    Non HDL Cholesterol 158 (H) <130 mg/dL    Narrative    Cholesterol  Desirable:  <200 mg/dL    Triglycerides  Normal:  Less than 150 mg/dL  Borderline High:  150-199 mg/dL  High:  200-499 mg/dL  Very High:  Greater than or equal to 500 mg/dL    Direct Measure HDL  Female:  Greater than or equal to 50 mg/dL   Male:  Greater than or equal to 40 mg/dL    LDL Cholesterol  Desirable:  <100mg/dL  Above Desirable:  100-129 mg/dL   Borderline High:  130-159 mg/dL   High:  160-189 mg/dL   Very High:  >= 190 mg/dL    Non HDL Cholesterol  Desirable:  130 mg/dL  Above Desirable:  130-159 mg/dL  Borderline High:  160-189 mg/dL  High:  190-219 mg/dL  Very High:  Greater than or equal to 220 mg/dL   Comprehensive metabolic panel (BMP + Alb,  Alk Phos, ALT, AST, Total. Bili, TP)   Result Value Ref Range    Sodium 131 (L) 136 - 145 mmol/L    Potassium 5.9 (H) 3.4 - 5.3 mmol/L    Chloride 94 (L) 98 - 107 mmol/L    Carbon Dioxide (CO2) 22 22 - 29 mmol/L    Anion Gap 15 7 - 15 mmol/L    Urea Nitrogen 19.5 8.0 - 23.0 mg/dL    Creatinine 1.29 (H) 0.67 - 1.17 mg/dL    Calcium 10.7 (H) 8.8 - 10.2 mg/dL    Glucose 318 (H) 70 - 99 mg/dL    Alkaline Phosphatase 96 40 - 129 U/L    AST 22 10 - 50 U/L    ALT 14 10 - 50 U/L    Protein Total 8.3 6.4 - 8.3 g/dL    Albumin 4.6 3.5 - 5.2 g/dL    Bilirubin Total 0.2 <=1.2 mg/dL    GFR Estimate 59 (L) >60 mL/min/1.73m2      Comment:      eGFR calculated using 2021 CKD-EPI equation.   TSH with free T4 reflex   Result Value Ref Range    TSH 3.03 0.30 - 4.20 uIU/mL   CBC with platelets and differential   Result Value Ref Range    WBC Count 5.9 4.0 - 11.0 10e3/uL    RBC Count 4.69 4.40 - 5.90 10e6/uL    Hemoglobin 14.1 13.3 - 17.7 g/dL    Hematocrit 43.2 40.0 - 53.0 %    MCV 92 78 - 100 fL    MCH 30.1 26.5 - 33.0 pg    MCHC 32.6 31.5 - 36.5 g/dL    RDW 13.7 10.0 - 15.0 %    Platelet Count 341 150 - 450 10e3/uL    % Neutrophils 66 %    % Lymphocytes 20 %    % Monocytes 10 %    % Eosinophils 3 %    % Basophils 1 %    % Immature Granulocytes 0 %    Absolute Neutrophils 3.9 1.6 - 8.3 10e3/uL    Absolute Lymphocytes 1.2 0.8 - 5.3 10e3/uL    Absolute Monocytes 0.6 0.0 - 1.3 10e3/uL    Absolute Eosinophils 0.2 0.0 - 0.7 10e3/uL    Absolute Basophils 0.0 0.0 - 0.2 10e3/uL    Absolute Immature Granulocytes 0.0 <=0.4 10e3/uL       If you have any questions or concerns, please call the clinic at the number listed above.       Sincerely,      Bereket Chino MD

## 2023-01-27 NOTE — PATIENT INSTRUCTIONS
Patient Education   Personalized Prevention Plan  You are due for the preventive services outlined below.  Your care team is available to assist you in scheduling these services.  If you have already completed any of these items, please share that information with your care team to update in your medical record.  Health Maintenance Due   Topic Date Due     Zoster (Shingles) Vaccine (1 of 2) Never done     COVID-19 Vaccine (4 - Booster for Moderna series) 01/26/2022     Eye Exam  09/01/2022     Flu Vaccine (1) 09/01/2022     A1C Lab  11/20/2022     Your Health Risk Assessment indicates you feel you are not in good health    A healthy lifestyle helps keep the body fit and the mind alert. It helps protect you from disease, helps you fight disease, and helps prevent chronic disease (disease that doesn't go away) from getting worse. This is important as you get older and begin to notice twinges in muscles and joints and a decline in the strength and stamina you once took for granted. A healthy lifestyle includes good healthcare, good nutrition, weight control, recreation, and regular exercise. Avoid harmful substances and do what you can to keep safe. Another part of a healthy lifestyle is stay mentally active and socially involved.    Good healthcare     Have a wellness visit every year.     If you have new symptoms, let us know right away. Don't wait until the next checkup.     Take medicines exactly as prescribed and keep your medicines in a safe place. Tell us if your medicine causes problems.   Healthy diet and weight control     Eat 3 or 4 small, nutritious, low-fat, high-fiber meals a day. Include a variety of fruits, vegetables, and whole-grain foods.     Make sure you get enough calcium in your diet. Calcium, vitamin D, and exercise help prevent osteoporosis (bone thinning).     If you live alone, try eating with others when you can. That way you get a good meal and have company while you eat it.     Try to  keep a healthy weight. If you eat more calories than your body uses for energy, it will be stored as fat and you will gain weight.     Recreation   Recreation is not limited to sports and team events. It includes any activity that provides relaxation, interest, enjoyment, and exercise. Recreation provides an outlet for physical, mental, and social energy. It can give a sense of worth and achievement. It can help you stay healthy.    Mental Exercise and Social Involvement  Mental and emotional health is as important as physical health. Keep in touch with friends and family. Stay as active as possible. Continue to learn and challenge yourself.   Things you can do to stay mentally active are:    Learn something new, like a foreign language or musical instrument.     Play SCRABBLE or do crossword puzzles. If you cannot find people to play these games with you at home, you can play them with others on your computer through the Internet.     Join a games club--anything from card games to chess or checkers or lawn bowling.     Start a new hobby.     Go back to school.     Volunteer.     Read.   Keep up with world events.    Exercise for a Healthier Heart  You may wonder how you can improve the health of your heart. If you re thinking about exercise, you re on the right track. You don t need to become an athlete. But you do need a certain amount of brisk exercise to help strengthen your heart. If you have been diagnosed with a heart condition, your healthcare provider may advise exercise to help stabilize your condition. To help make exercise a habit, choose safe, fun activities.      Exercise with a friend. When activity is fun, you're more likely to stick with it.   Before you start  Check with your healthcare provider before starting an exercise program. This is especially important if you have not been active for a while. It's also important if you have a long-term (chronic) health problem such as heart disease,  diabetes, or obesity. Or if you are at high risk for having these problems.   Why exercise?  Exercising regularly offers many healthy rewards. It can help you do all of the following:     Improve your blood cholesterol level to help prevent further heart trouble    Lower your blood pressure to help prevent a stroke or heart attack    Control diabetes, or reduce your risk of getting this disease    Improve your heart and lung function    Reach and stay at a healthy weight    Make your muscles stronger so you can stay active    Prevent falls and fractures by slowing the loss of bone mass (osteoporosis)    Manage stress better    Reduce your blood pressure    Improve your sense of self and your body image  Exercise tips      Ease into your routine. Set small goals. Then build on them. If you are not sure what your activity level should be, talk with your healthcare provider first before starting an exercise routine.    Exercise on most days. Aim for a total of 150 minutes (2 hours and 30 minutes) or more of moderate-intensity aerobic activity each week. Or 75 minutes (1 hour and 15 minutes) or more of vigorous-intensity aerobic activity each week. Or try for a combination of both. Moderate activity means that you breathe heavier and your heart rate increases but you can still talk. Think about doing 40 minutes of moderate exercise, 3 to 4 times a week. For best results, activity should last for about 40 minutes to lower blood pressure and cholesterol. It's OK to work up to the 40-minute period over time. Examples of moderate-intensity activity are walking 1 mile in 15 minutes. Or doing 30 to 45 minutes of yard work.    Step up your daily activity level.  Along with your exercise program, try being more active the whole day. Walk instead of drive. Or park further away so that you take more steps each day. Do more household tasks or yard work. You may not be able to meet the advised mount of physical activity. But doing  some moderate- or vigorous-intensity aerobic activity can help reduce your risk for heart disease. Your healthcare provider can help you figure out what is best for you.    Choose 1 or more activities you enjoy.  Walking is one of the easiest things you can do. You can also try swimming, riding a bike, dancing, or taking an exercise class.    When to call your healthcare provider  Call your healthcare provider if you have any of these:     Chest pain or feel dizzy or lightheaded    Burning, tightness, pressure, or heaviness in your chest, neck, shoulders, back, or arms    Abnormal shortness of breath    More joint or muscle pain    A very fast or irregular heartbeat (palpitations)  [employer] last reviewed this educational content on 7/1/2019 2000-2021 [employer]. All rights reserved. This information is not intended as a substitute for professional medical care. Always follow your healthcare professional's instructions.          Understanding USDA MyPlate  The USDA has guidelines to help you make healthy food choices. These are called MyPlate. MyPlate shows the food groups that make up healthy meals using the image of a place setting. Before you eat, think about the healthiest choices for what to put on your plate or in your cup or bowl. To learn more about building a healthy plate, visit www.choosemyplate.gov.    The food groups    Fruits. Any fruit or 100% fruit juice counts as part of the Fruit Group. Fruits may be fresh, canned, frozen, or dried, and may be whole, cut-up, or pureed. Make 1/2 of your plate fruits and vegetables.    Vegetables. Any vegetable or 100% vegetable juice counts as a member of the Vegetable Group. Vegetables may be fresh, frozen, canned, or dried. They can be served raw or cooked and may be whole, cut-up, or mashed. Make 1/2 of your plate fruits and vegetables.    Grains. All foods made from grains are part of the Grains Group. These include wheat, rice, oats, cornmeal,  and barley. Grains are often used to make foods such as bread, pasta, oatmeal, cereal, tortillas, and grits. Grains should be no more than 1/4 of your plate. At least half of your grains should be whole grains.    Protein. This group includes meat, poultry, seafood, beans and peas, eggs, processed soy products (such as tofu), nuts (including nut butters), and seeds. Make protein choices no more than 1/4 of your plate. Meat and poultry choices should be lean or low fat.    Dairy. The Dairy Group includes all fluid milk products and foods made from milk that contain calcium, such as yogurt and cheese. (Foods that have little calcium, such as cream, butter, and cream cheese, are not part of this group.) Most dairy choices should be low-fat or fat-free.    Oils. Oils aren't a food group, but they do contain essential nutrients. However it's important to watch your intake of oils. These are fats that are liquid at room temperature. They include canola, corn, olive, soybean, vegetable, and sunflower oil. Foods that are mainly oil include mayonnaise, certain salad dressings, and soft margarines. You likely already get your daily oil allowance from the foods you eat.  Things to limit  Eating healthy also means limiting these things in your diet:       Salt (sodium). Many processed foods have a lot of sodium. To keep sodium intake down, eat fresh vegetables, meats, poultry, and seafood when possible. Purchase low-sodium, reduced-sodium, or no-salt-added food products at the store. And don't add salt to your meals at home. Instead, season them with herbs and spices such as dill, oregano, cumin, and paprika. Or try adding flavor with lemon or lime zest and juice.    Saturated fat. Saturated fats are most often found in animal products such as beef, pork, and chicken. They are often solid at room temperature, such as butter. To reduce your saturated fat intake, choose leaner cuts of meat and poultry. And try healthier cooking  methods such as grilling, broiling, roasting, or baking. For a simple lower-fat swap, use plain nonfat yogurt instead of mayonnaise when making potato salad or macaroni salad.    Added sugars. These are sugars added to foods. They are in foods such as ice cream, candy, soda, fruit drinks, sports drinks, energy drinks, cookies, pastries, jams, and syrups. Cut down on added sugars by sharing sweet treats with a family member or friend. You can also choose fruit for dessert, and drink water or other unsweetened beverages.     [employer] last reviewed this educational content on 6/1/2020 2000-2021 [employer]. All rights reserved. This information is not intended as a substitute for professional medical care. Always follow your healthcare professional's instructions.          Signs of Hearing Loss      Hearing much better with one ear can be a sign of hearing loss.   Hearing loss is a problem shared by many people. In fact, it is one of the most common health problems, particularly as people age. Most people age 65 and older have some hearing loss. By age 80, almost everyone does. Hearing loss often occurs slowly over the years. So you may not realize your hearing has gotten worse.  Have your hearing checked  Call your healthcare provider if you:    Have to strain to hear normal conversation    Have to watch other people s faces very carefully to follow what they re saying    Need to ask people to repeat what they ve said    Often misunderstand what people are saying    Turn the volume of the television or radio up so high that others complain    Feel that people are mumbling when they re talking to you    Find that the effort to hear leaves you feeling tired and irritated    Notice, when using the phone, that you hear better with one ear than the other  burrp! last reviewed this educational content on 1/1/2020 2000-2021 [employer]. All rights reserved. This information is not intended  as a substitute for professional medical care. Always follow your healthcare professional's instructions.        Your Health Risk Assessment indicates you feel you are not in good emotional health.    Recreation   Recreation is not limited to sports and team events. It includes any activity that provides relaxation, interest, enjoyment, and exercise. Recreation provides an outlet for physical, mental, and social energy. It can give a sense of worth and achievement. It can help you stay healthy.    Mental Exercise and Social Involvement  Mental and emotional health is as important as physical health. Keep in touch with friends and family. Stay as active as possible. Continue to learn and challenge yourself.   Things you can do to stay mentally active are:    Learn something new, like a foreign language or musical instrument.     Play SCRABBLE or do crossword puzzles. If you cannot find people to play these games with you at home, you can play them with others on your computer through the Internet.     Join a games club--anything from card games to chess or checkers or lawn bowling.     Start a new hobby.     Go back to school.     Volunteer.     Read.   Keep up with world events.    Depression and Suicide in Older Adults    Nearly 2 million older Americans have some type of depression. Some of them even take their own lives. Yet depression among older adults is often ignored. Learn the warning signs. You may help spare a loved one needless pain. You may also save a life.   What is depression?  Depression is a common and serious illness that affects the way you think and feel. It is not a normal part of aging, nor is it a sign of weakness, a character flaw, or something you can snap out of. Most people with depression need treatment to get better. The most common symptom is a feeling of deep sadness. People who are depressed also may seem tired and listless. And nothing seems to give them pleasure. It s normal to  "grieve or be sad sometimes. But sadness lessens or passes with time. Depression rarely goes away or improves on its own. A person with clinical depression can't \"snap out of it.\" Other symptoms of depression are:     Sleeping more or less than normal    Eating more or less than normal    Having headaches, stomachaches, or other pains that don t go away    Feeling nervous,  empty,  or worthless    Crying a great deal    Thinking or talking about suicide or death    Loss of interest in activities previously enjoyed    Social isolation    Feeling confused or forgetful  What causes it?  The causes of depression aren t fully known. But it is thought to result from a complex blend of these factors:     Biochemistry. Certain chemicals in the brain play a role.    Genes. Depression does run in families.    Life stress. Life stresses can also trigger depression in some people. Older adults often face many stressors, such as death of friends or a spouse, health problems, and financial concerns.    Chronic conditions. This includes conditions such as diabetes, heart disease, or cancer. These can cause symptoms of depression. Medicine side effects can cause changes in thoughts and behaviors.  How you can help  Often, depressed people may not want to ask for help. When they do, they may be ignored. Or, they may receive the wrong treatment. You can help by showing parents and older friends love and support. If they seem depressed, don t lecture the person, ignore the symptoms, or discount the symptoms as a  normal  part of aging -which they are not. Get involved, listen, and show interest and support.   Help them understand that depression is a treatable illness. Tell them you can help them find the right treatment. Offer to go to their healthcare provider's appointment with them for support when the symptoms are discussed. With their approval, contact a local mental health center, social service agency, or hospital about " services.   You can be an advocate for him or her at healthcare appointments. Many older adults have chronic illnesses that can cause symptoms of depression. Medicine side effects can change thoughts and behaviors. You can help make sure that the healthcare provider looks at all of these factors. He or she should refer your family member or friend to a mental healthcare provider when needed. in some cases, untreated depression can lead to a misdiagnosis. A person may be diagnosed with a brain disorder such as dementia. If the healthcare provider does not take the issue of depression seriously, help your family member or friend to find another provider.   Don't be afraid to ask  If you think an older person you care about could be suicidal, ask,  Have you thought about suicide?  Most people will tell you the truth. If they say  yes,  they may already have a plan for how and when they will attempt it. Find out as much as you can. The more detailed the plan, and the easier it is to carry out, the more danger the person is in right now. Tell the person you are there for them and do not want them to harm him or herself. Don't wait to get help for the person. Call the person's healthcare provider, local hospital, or emergency services.   To learn more    National Suicide Prevention Lifeline (crisis hotline) 423-503-SIJA (581-315-1517)    National [place] of Mental Dwfqhz389-543-3772tgj.nimh.nih.gov    National [place] on Mental Yzkjcsb457-104-3919sik.chase.org    Mental Health Ivybasp688-746-0538zmm.[facility].org    National Suicide Ntdjyuf624-HVVMAVG (781-832-5725)    Call 911  Never leave the person alone. A person who is actively suicidal needs psychiatric care right away. They will need constant supervision. Never leave the person out of sight. Call 911 or the national 24-hour suicide crisis hotline at 546-101-BUAJ (837-137-2966). You can also take the person to the closest emergency room.   [employer] last reviewed this  educational content on 5/1/2020 2000-2021 [employer]. All rights reserved. This information is not intended as a substitute for professional medical care. Always follow your healthcare professional's instructions.          Preventing Falls at Home  A person can fall for many reasons. Older adults may fall because reaction time slows down as we age. Your muscles and joints may get stiff, weak, or less flexible because of illness, medicines, or a physical condition.   Other health problems that make falls more likely include:     Arthritis    Dizziness or lightheadedness when you stand up (orthostatic hypotension)    History of a stroke    Dizziness    Anemia    Certain medicines taken for mental illness or to control blood pressure.    Problems with balance or gait    Bladder or urinary problems    History of falling    Changes in vision (vision impairment)    Changes in thinking skills and memory (cognitive impairment)  Injuries from a fall can include serious injuries such as broken bones, dislocated joints, internal bleeding and cuts. Injuries like these can limit your independence.   Prevention tips  To help prevent falls and fall-related injuries, follow the tips below.    Floors  To make floors safer:     Put nonskid pads under area rugs.    Remove small rugs.    Replace worn floor coverings.    Tack carpets firmly to each step on carpeted stairs. Put nonskid strips on the edges of uncarpeted stairs.    Keep floors and stairs free of clutter and cords.    Arrange furniture so there are clear pathways.    Clean up any spills right away.  Bathrooms    To make bathrooms safer:     Install grab bars in the tub or shower.    Apply nonskid strips or put a nonskid rubber mat in the tub or shower.    Sit on a bath chair to bathe.    Use bathmats with nonskid backing.  Lighting  To improve visibility in your home:      Keep a flashlight in each room. Or put a lamp next to the bed within easy  reach.    Put nightlights in the bedrooms, hallways, kitchen, and bathrooms.    Make sure all stairways have good lighting.    Take your time when going up and down stairs.    Put handrails on both sides of stairs and in walkways for more support. To prevent injury to your wrist or arm, don t use handrails to pull yourself up.    Install grab bars to pull yourself up.    Move or rearrange items that you use often. This will make them easier to find or reach.    Look at your home to find any safety hazards. Especially look at doorways, walkways, and the driveway. Remove or repair any safety problems that you find.  Other changes to make    Look around to find any safety hazards. Look closely at doorways, walkways, and the driveway. Remove or repair any safety problems that you find.    Wear shoes that fit well.    Take your time when going up and down stairs.    Put handrails on both sides of stairs and in walkways for more support. To prevent injury to your wrist or arm, don t use handrails to pull yourself up.    Install grab bars wherever needed to pull yourself up.    Arrange items that you use often. This will make them easier to find or reach.    [employer] last reviewed this educational content on 3/1/2020    4854-7057 [employer]. All rights reserved. This information is not intended as a substitute for professional medical care. Always follow your healthcare professional's instructions.

## 2023-01-27 NOTE — TELEPHONE ENCOUNTER
Notification from [facility] on [place] for   Disp Refills Start End NAY    insulin glargine (LANTUS PEN) 100 UNIT/ML pen 15 mL 3 1/27/2023  No   Sig - Route: Inject 15 Units Subcutaneous every morning - Subcutaneous   Sent to pharmacy as: Insulin Glargine 100 UNIT/ML Subcutaneous Solution Pen-injector (LANTUS PEN)   Class: E-Prescribe   Notes to Pharmacy: If Lantus is not covered by insurance, may substitute Basaglar or Semglee or other insulin glargine product per insurance preference at same dose and frequency.       Pharmacy comments  Alternative requested:  Plan states samglee/insulin glargine is covered, but the will not go through [facility].  Prior authorization needed.

## 2023-01-31 NOTE — TELEPHONE ENCOUNTER
Please inform patient that I would like to recheck his potassium in the next couple of days.  It was elevated which I believe is secondary to his high blood sugar.  He should take his insulin regularly and come back for lab only visit and we should recheck these numbers.    If his potassium remains elevated, then we need to consider changing his lisinopril to another blood pressure medication.    Bereket Chino MD

## 2023-02-01 NOTE — TELEPHONE ENCOUNTER
Called pharmacy to get patient's insurance information.  Currently the only insurance the pharmacy has on file for patient is his medicare supplemental plan which does not cover prescription medication.  Pharmacy states patient will need to provide pharmacy with current Medicare Part D insurance information before they will be able to bill his medications.

## 2023-02-03 ENCOUNTER — TELEPHONE (OUTPATIENT)
Dept: FAMILY MEDICINE | Facility: CLINIC | Age: 73
End: 2023-02-03
Payer: MEDICARE

## 2023-02-03 DIAGNOSIS — F41.9 ANXIETY: Primary | ICD-10-CM

## 2023-02-03 DIAGNOSIS — J31.0 CHRONIC RHINITIS: ICD-10-CM

## 2023-02-03 NOTE — TELEPHONE ENCOUNTER
Patient called in stating he needs refills of some medications. Patient was unable to give any names of the medications he is taking, but he said one is for sleep, the other is for itching and the other is a nasal inhaler. While reviewing his chart I was unable to find the nasal inhaler. None of these medications were prescribed by Dr. DE DIOS. Patient stated that all three were prescribed by a Dr. Bone in [place]. Unable to fine any medications filled by Dr. Bone in our chart. Patient previously was prescribed melatonin 5 mg, hydroxyzine  25mg. Please advise, patient last seen by Dr. DE DIOS 1/27/23.

## 2023-02-06 RX ORDER — HYDROXYZINE HYDROCHLORIDE 25 MG/1
25 TABLET, FILM COATED ORAL EVERY 8 HOURS PRN
Qty: 90 TABLET | Refills: 1 | Status: SHIPPED | OUTPATIENT
Start: 2023-02-06 | End: 2023-04-22

## 2023-02-06 RX ORDER — FLUTICASONE PROPIONATE 50 MCG
1 SPRAY, SUSPENSION (ML) NASAL DAILY
Qty: 11.1 ML | Refills: 1 | Status: SHIPPED | OUTPATIENT
Start: 2023-02-06 | End: 2023-04-06

## 2023-02-06 NOTE — TELEPHONE ENCOUNTER
Spoke to patient and he doesn't have the prescriptions any more.  He was taking hydroxyzine 25 mg for itching, trazodone 50 mg for sleep, and flonase nasal spray. Please advise if you will refill these medications.

## 2023-02-06 NOTE — TELEPHONE ENCOUNTER
Please inform patient that I have sent prescription for hydroxyzine and Flonase to the pharmacy.  He is not a good candidate for medication trazodone due to noted QT interval that is increased.  This can put him at increase of cardiac problem.    I can consider starting him on gabapentin that can help with sleep and also help with his alcohol use.    Patient let me know if he is willing to start the medication.    Bereket Chino MD

## 2023-02-06 NOTE — TELEPHONE ENCOUNTER
Please asked patient if he can bring all his medication and come for a nurse visit and the medication can be reconciled and I should be able to prescribe melatonin and/or hydroxyzine if that is what is needed.      Bereket Chino MD

## 2023-02-07 ENCOUNTER — OFFICE VISIT (OUTPATIENT)
Dept: OPHTHALMOLOGY | Facility: CLINIC | Age: 73
End: 2023-02-07
Payer: MEDICARE

## 2023-02-07 DIAGNOSIS — H17.9 CORNEAL SCAR, LEFT EYE: ICD-10-CM

## 2023-02-07 DIAGNOSIS — H25.813 COMBINED FORMS OF AGE-RELATED CATARACT OF BOTH EYES: ICD-10-CM

## 2023-02-07 DIAGNOSIS — E11.69 TYPE 2 DIABETES MELLITUS WITH OTHER SPECIFIED COMPLICATION, WITH LONG-TERM CURRENT USE OF INSULIN (H): Primary | ICD-10-CM

## 2023-02-07 DIAGNOSIS — H52.4 PRESBYOPIA: ICD-10-CM

## 2023-02-07 DIAGNOSIS — Z79.4 TYPE 2 DIABETES MELLITUS WITH OTHER SPECIFIED COMPLICATION, WITH LONG-TERM CURRENT USE OF INSULIN (H): Primary | ICD-10-CM

## 2023-02-07 DIAGNOSIS — Z01.01 ENCOUNTER FOR EXAMINATION OF EYES AND VISION WITH ABNORMAL FINDINGS: ICD-10-CM

## 2023-02-07 PROCEDURE — 92015 DETERMINE REFRACTIVE STATE: CPT | Mod: GY | Performed by: OPHTHALMOLOGY

## 2023-02-07 PROCEDURE — 92004 COMPRE OPH EXAM NEW PT 1/>: CPT | Performed by: OPHTHALMOLOGY

## 2023-02-07 ASSESSMENT — REFRACTION_WEARINGRX
OS_CYLINDER: SPHERE
OD_SPHERE: -1.00
OS_CYLINDER: +1.50
OD_CYLINDER: SPHERE
OS_ADD: +2.50
OS_AXIS: 001
OD_SPHERE: +3.00
OD_CYLINDER: +1.00
SPECS_TYPE: BIFOCAL
OS_SPHERE: -1.25
OD_AXIS: 178
OS_SPHERE: +3.00
OD_ADD: +2.50
SPECS_TYPE: OTC

## 2023-02-07 ASSESSMENT — REFRACTION_MANIFEST
OS_ADD: +2.75
OD_ADD: +2.75
OD_SPHERE: -1.50
OS_CYLINDER: +2.00
OS_AXIS: 007
OD_CYLINDER: +2.25
OS_SPHERE: -1.75
OD_AXIS: 005

## 2023-02-07 ASSESSMENT — SLIT LAMP EXAM - LIDS
COMMENTS: 3+ DERMATOCHALASIS, 1+ MEIBOMIAN GLAND DYSFUNCTION
COMMENTS: 3+ DERMATOCHALASIS, 1+ MEIBOMIAN GLAND DYSFUNCTION

## 2023-02-07 ASSESSMENT — TONOMETRY
OS_IOP_MMHG: 16
IOP_METHOD: APPLANATION
OD_IOP_MMHG: 15

## 2023-02-07 ASSESSMENT — CONF VISUAL FIELD
OD_NORMAL: 1
OD_SUPERIOR_TEMPORAL_RESTRICTION: 0
OS_INFERIOR_NASAL_RESTRICTION: 0
OS_SUPERIOR_TEMPORAL_RESTRICTION: 0
OS_INFERIOR_TEMPORAL_RESTRICTION: 0
OD_SUPERIOR_NASAL_RESTRICTION: 0
OD_INFERIOR_TEMPORAL_RESTRICTION: 0
OD_INFERIOR_NASAL_RESTRICTION: 0
OS_NORMAL: 1
OS_SUPERIOR_NASAL_RESTRICTION: 0

## 2023-02-07 ASSESSMENT — CUP TO DISC RATIO
OS_RATIO: 0.2
OD_RATIO: 0.2

## 2023-02-07 ASSESSMENT — VISUAL ACUITY
OS_CC: 20/40
CORRECTION_TYPE: GLASSES
OS_CC+: -2
OD_PH_CC+: -2
OD_PH_CC: 20/40
OD_CC+: -1
OD_CC: 20/50
OS_PH_CC: 20/40
METHOD: SNELLEN - LINEAR

## 2023-02-07 ASSESSMENT — EXTERNAL EXAM - RIGHT EYE: OD_EXAM: 2+ BROW PTOSIS

## 2023-02-07 ASSESSMENT — EXTERNAL EXAM - LEFT EYE: OS_EXAM: 2+ BROW PTOSIS

## 2023-02-07 NOTE — LETTER
"    2/7/2023         RE: Donovan Jane  [address] [facility] [address]        Dear Colleague,    Thank you for referring your patient, Donovan Jane, to the [facility]. Please see a copy of my visit note below.     Current Eye Medications:  None.       Subjective:  Patient is here for a Diabetic Eye Exam.  Blood sugars have been elevated.  Vision has been good in each eye, with correction.  He is wearing prescription sunglasses today as his prescription glasses broke about one year ago.  He also wears over-the-counter readers.    History of steel foreign body in his right eye.  No family history of glaucoma.     Lab Results   Component Value Date    A1C 10.7 01/27/2023    A1C 6.5 05/20/2022    A1C 6.8 07/24/2018     Was recently off meds temporarily, so elevated A1C.     Objective:  See Ophthalmology Exam.       Assessment:  Baseline eye exam in patient with diabetes.  No diabetic retinopathy.  Mild-moderate cataracts.      ICD-10-CM    1. Type 2 diabetes mellitus with other specified complication, with long-term current use of insulin (H)  E11.69     Z79.4       2. Combined forms of age-related cataract, mild-mod, of both eyes  H25.813       3. Corneal scar, left eye  H17.9       4. Encounter for examination of eyes and vision with abnormal findings  Z01.01       5. Presbyopia  H52.4            Plan:  Glasses prescription given - optional  May use artificial tears up to four times a day (like Refresh Optive, Systane Balance, TheraTears, or generic artificial tears are ok. Avoid \"get the red out\" drops).   Possible posterior vitreous detachment (sudden onset large floater and/or flashing lights) both eyes discussed.   Call in October 2023 for an appointment in February 2024 for Complete Exam    Dr. Winston [phone]             Again, thank you for allowing me to participate in the care of your patient.        Sincerely,        Rashawn Winston MD    "

## 2023-02-07 NOTE — PROGRESS NOTES
" Current Eye Medications:  None.       Subjective:  Patient is here for a Diabetic Eye Exam.  Blood sugars have been elevated.  Vision has been good in each eye, with correction.  He is wearing prescription sunglasses today as his prescription glasses broke about one year ago.  He also wears over-the-counter readers.    History of steel foreign body in his right eye.  No family history of glaucoma.     Lab Results   Component Value Date    A1C 10.7 01/27/2023    A1C 6.5 05/20/2022    A1C 6.8 07/24/2018     Was recently off meds temporarily, so elevated A1C.     Objective:  See Ophthalmology Exam.       Assessment:  Baseline eye exam in patient with diabetes.  No diabetic retinopathy.  Mild-moderate cataracts.      ICD-10-CM    1. Type 2 diabetes mellitus with other specified complication, with long-term current use of insulin (H)  E11.69     Z79.4       2. Combined forms of age-related cataract, mild-mod, of both eyes  H25.813       3. Corneal scar, left eye  H17.9       4. Encounter for examination of eyes and vision with abnormal findings  Z01.01       5. Presbyopia  H52.4            Plan:  Glasses prescription given - optional  May use artificial tears up to four times a day (like Refresh Optive, Systane Balance, TheraTears, or generic artificial tears are ok. Avoid \"get the red out\" drops).   Possible posterior vitreous detachment (sudden onset large floater and/or flashing lights) both eyes discussed.   Call in October 2023 for an appointment in February 2024 for Complete Exam    Dr. Winston [phone]         " Detail Level: Zone

## 2023-02-07 NOTE — PATIENT INSTRUCTIONS
"Glasses prescription given - optional  May use artificial tears up to four times a day (like Refresh Optive, Systane Balance, TheraTears, or generic artificial tears are ok. Avoid \"get the red out\" drops).   Possible posterior vitreous detachment (sudden onset large floater and/or flashing lights) both eyes discussed.   Call in October 2023 for an appointment in February 2024 for Complete Exam    Dr. Winston [phone]      Patient Education   Diabetes weakens the blood vessels all over the body, including the eyes. Damage to the blood vessels in the eyes can cause swelling or bleeding into part of the eye (called the retina). This is called diabetic retinopathy (JENNIFER-tin-AH-puh-thee). If not treated, this disease can cause vision loss or blindness.   Symptoms may include blurred or distorted vision, but many people have no symptoms. It's important to see your eye doctor regularly to check for problems.   Early treatment and good control can help protect your vision. Here are the things you can do to help prevent vision loss:      1. Keep your blood sugar levels under tight control.      2. Bring high blood pressure under control.      3. No smoking.      4. Have yearly dilated eye exams.       "

## 2023-02-07 NOTE — TELEPHONE ENCOUNTER
Attempted to call patient, left message for return call to clinic. Please see provider notation below when patient returns call.    Mary Alice Martin RN

## 2023-02-10 ENCOUNTER — TELEPHONE (OUTPATIENT)
Dept: FAMILY MEDICINE | Facility: CLINIC | Age: 73
End: 2023-02-10
Payer: MEDICARE

## 2023-02-10 NOTE — TELEPHONE ENCOUNTER
Attempted to call patient, left message for return call to clinic. Please clarify if patient is referencing tizanidine or trazodone in his request. Reviewing chart, I do not see any reference to tizanidine recently but I do see a 2/3 telephone encounter requesting/discussing trazodone. Please clarify and if requesting trazodone please see provider documentation from 2/3 telephone encounter below.        Mary Alice Martin RN

## 2023-02-10 NOTE — TELEPHONE ENCOUNTER
Reason for call:  Other   Patient called regarding (reason for call): prescription  Additional comments: Pt is calling to check on status of tiZANidine prescription. States that it was discussed in his last appointment, but pharmacy has not received the prescription    Phone number to reach patient:  Home number on file [phone] (home)    Best Time:  any    Can we leave a detailed message on this number?  YES    Travel screening: Not Applicable

## 2023-02-13 NOTE — TELEPHONE ENCOUNTER
Pt returned call, says he doesn't really remember that name of the medication, says that he and Dr DE DIOS had discussed a medication to help him sleep.

## 2023-02-13 NOTE — TELEPHONE ENCOUNTER
Relayed message below from Dr. Chino to patient. Patient states he's tried gabapentin in the past and it doesn't help with sleep, wondering if there is something else Dr. Chino can prescribe for sleep?  Patient wants Dr. Chino to know that he doesn't drink alcohol anymore.

## 2023-02-13 NOTE — TELEPHONE ENCOUNTER
Please relay information that I have sent prescription of hydroxyzine to the pharmacy to help with sleep and itching.  He is not a candidate for trazodone according to message before.  Please also relay that I can consider starting him on gabapentin that can help him with his sleep and also help with his alcohol use.    Bereket Chino MD

## 2023-02-14 NOTE — TELEPHONE ENCOUNTER
Please inform patient that I have sent prescription of hydroxyzine to the pharmacy.  Taken at night and this should help with sleep.  He can take 2 pills if needed    Bereket Chino MD

## 2023-02-17 NOTE — TELEPHONE ENCOUNTER
Incoming call from patient:  He states that the hydrOXYzine and gabapentin do nothing to help him with sleep. Relayed message from 2/14 stating that he can take 2 pills of hydroxyzine if needed. He states that he will try that out for a couple nights and see how that goes. He will call back to update.

## 2023-02-27 PROBLEM — M54.50 LUMBAR SPINE PAIN: Status: RESOLVED | Noted: 2022-10-03 | Resolved: 2023-02-27

## 2023-03-01 ENCOUNTER — ALLIED HEALTH/NURSE VISIT (OUTPATIENT)
Dept: EDUCATION SERVICES | Facility: CLINIC | Age: 73
End: 2023-03-01
Payer: MEDICARE

## 2023-03-01 VITALS — BODY MASS INDEX: 33.26 KG/M2 | WEIGHT: 207.6 LBS

## 2023-03-01 DIAGNOSIS — E11.65 POORLY CONTROLLED DIABETES MELLITUS (H): ICD-10-CM

## 2023-03-01 PROCEDURE — 99207 PR DROP WITH A PROCEDURE: CPT

## 2023-03-01 PROCEDURE — G0108 DIAB MANAGE TRN  PER INDIV: HCPCS

## 2023-03-01 NOTE — LETTER
3/1/2023         RE: Donovan Jane  [address] [facility] [address]        Dear Colleague,    Thank you for referring your patient, Donovan Jane, to the [facility]. Please see a copy of my visit note below.    Diabetes Self-Management Education & Support    Presents for: Individual review    Type of Service: In Person Visit    Assessment Type:   ASSESSMENT:  Donovan is a 72-year-old gentleman who comes to clinic today for consult in regards to his current diabetes self-management skills with an A1c not at ADA goal.  Review of the order placed by Dr. Chino indicated that this was a new diagnosis for him, however after speaking with him found out it was not and that he has had diabetes for approximately 10 years.  I did however learn through our conversation today that he had never received any type of formal education regarding diabetes or self-management so we ended up mutually deciding today that we would treat this as if it were a new diagnosis and he would be given information/education on the components included in an initial education visit.  Medications were reviewed with Donovan today and he reported he is taking 34 units of Lantus daily-typically midmorning.  States he does take daily with no missed or skipped doses.  Currently has not been checking his blood glucose on a regular basis, however he did check this morning and reported it returned at 210 mg/dL .  Because there is no consistency in his monitoring I asked him if he would be interested in starting a personal continuous glucose monitor-he indicated he would.  We unfortunately, were unable to successfully download the conner onto his phone so the initiation of this is temporarily postponed.   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  He lives with his son in a [facility].  He currently is a smoker, smoking cessation counseling was provided to him today and I strongly encouraged  him to consider quitting.  Prior to our visit today his most recent office visit notes as well as lab results were reviewed.    Patient's most recent   Lab Results   Component Value Date    A1C 10.7 01/27/2023     is not meeting goal of <7.0    Diabetes knowledge and skills assessment:   Patient is knowledgeable in diabetes management concepts related to: Taking Medication and Healthy Coping    Continue education with the following diabetes management concepts: Healthy Eating, Being Active, Monitoring, Taking Medication, Reducing Risks and Healthy Coping    Based on learning assessment above, most appropriate setting for further diabetes education would be: Individual setting.      PLAN  1. Eat 3 balanced meals each day - Monitor carb intake and limit to 60-75 grams per meal  This would be equal to 4-5 choices ~  1 choice = 15 grams    Do not wait longer than 4-5 hours to eat something  Snacks limit to no more than 30 grams of carbohydrates or 2 choices  Make sure you include protein source with each meal and at bedtime - this has been shown to help with blood glucose elevations    2. Check blood sugars 2  times each day  - when you first wake up before eating  - 2 hours AFTER dinner    Fasting and before meal target is 80 - 130   2 hours after a meal target is < 180  remember to bring meter and log book to all appointments    3. Incorporate 30 minutes activity into each day - does not need to be all at one time & walking counts    4. Take diabetes medications as prescribed 34 units of Lantus each day     Would like to get patient started with a personal CGM and had intentions of doing that in clinic today however were unable to access the conner on his phone for download.  Instructed him to get a hold of his phone carrier and have them assist him with downloading this conner- once this is done I instructed him to call me and we will put a sample sensor on him from clinic.    Topics to cover at upcoming visits: Healthy  "Eating, Being Active, Monitoring, Taking Medication, Reducing Risks and Healthy Coping    Follow-up: TBD-first need to get sensor on patient    See Care Plan for co-developed, patient-state behavior change goals.  AVS provided for patient today.    Education Materials Provided:  Living Healthy with Diabetes, Carbohydrate Counting and My Plate Planner      SUBJECTIVE/OBJECTIVE:  Presents for: Individual review  Accompanied by: Self  Diabetes education in the past 24 mo: No  Focus of Visit: Monitoring, Reducing Risks, Taking Medication, Healthy Coping, Healthy Eating, Problem Solving, Self-care behavioral goal setting, Assistance w/ making life changes, Diabetes Pathophysiology, Being Active  Diabetes type: Type 2  Date of diagnosis: > 5 yrs  Disease course: Getting harder to manage  How confident are you filling out medical forms by yourself:: Extremely  Transportation concerns: No  Difficulty affording diabetes medication?: No  Difficulty affording diabetes testing supplies?: No  Other concerns:: None  Cultural Influences/Ethnic Background:  Not  or       Diabetes Symptoms & Complications:  Fatigue: Yes  Neuropathy: Yes  Polydipsia: Sometimes  Polyphagia: Sometimes  Polyuria: Yes  Visual change: No  Slow healing wounds: Sometimes  Weight trend: Stable  Complications assessed today?: Yes  Autonomic neuropathy: Yes  CVA: No  Heart disease: No  Nephropathy: No  Peripheral neuropathy: Yes  Peripheral Vascular Disease: Yes  Retinopathy: No  Sexual dysfunction: Yes    Patient Problem List and Family Medical History reviewed for relevant medical history, current medical status, and diabetes risk factors.    Vitals:  Wt 94.2 kg (207 lb 9.6 oz)   BMI 33.26 kg/m    Estimated body mass index is 33.26 kg/m  as calculated from the following:    Height as of 1/27/23: 1.683 m (5' 6.25\").    Weight as of this encounter: 94.2 kg (207 lb 9.6 oz).   Last 3 BP:   BP Readings from Last 3 Encounters:   01/27/23 (!) 142/92 "   09/02/22 122/56   07/29/22 137/85       History   Smoking Status     Every Day     Packs/day: 1.00     Years: 40.00     Types: Cigarettes   Smokeless Tobacco     Never       Labs:  Lab Results   Component Value Date    A1C 10.7 01/27/2023     Lab Results   Component Value Date     01/27/2023     07/21/2022     06/20/2022     06/20/2022     Lab Results   Component Value Date     01/27/2023     Direct Measure HDL   Date Value Ref Range Status   01/27/2023 46 >=40 mg/dL Final   ]  GFR Estimate   Date Value Ref Range Status   01/27/2023 59 (L) >60 mL/min/1.73m2 Final     Comment:     eGFR calculated using 2021 CKD-EPI equation.   07/24/2018 >60 >60 mL/min/1.73m2 Final     GFR Estimate If Black   Date Value Ref Range Status   07/24/2018 >60 >60 mL/min/1.73m2 Final     Lab Results   Component Value Date    CR 1.29 01/27/2023     No results found for: MICROALBUMIN    Healthy Eating:  Healthy Eating Assessed Today: Yes  Cultural/Anabaptist diet restrictions?: No  Meal planning/habits: Avoiding sweets, Heart healthy, Smaller portions  How many times a week on average do you eat food made away from home (restaurant/take-out)?: 2  Meals include: Breakfast, Dinner  Breakfast: 630 to 7 AM will have coffee black, around 10 AM will have typically 2 eggs with toast  Lunch: Skips  Dinner: 6 to 7 PM: + Protein + vegetable (corn)  Snacks: In the evening or before bed might have leftovers  Beverages: Water, Coffee, Juice, Diet soda, Milk (Milk is buttermilk, coffee is black)  Has patient met with a dietitian in the past?:  (unknown)    Being Active:  Being Active Assessed Today: Yes  Exercise:: Currently not exercising  Barrier to exercise: Physical limitation (Chronic back pain)    Monitoring:  Monitoring Assessed Today: Yes  Did patient bring glucose meter to appointment? : No  Blood Glucose Meter: Accu-chek  Times checking blood sugar at home (number): 1 - this is not consistent  Times checking  blood sugar at home (per): Day  Blood glucose trend: Increasing    We had no blood glucose data available today for review    Taking Medications:  Diabetes Medication(s)     Insulin       insulin glargine (LANTUS PEN) 100 UNIT/ML pen    Inject 35 Units Subcutaneous every morning          Taking Medication Assessed Today: Yes  Current Treatments: Insulin Injections  Dose schedule: Pre-breakfast  Given by: Patient  Injection/Infusion sites: Abdomen  Problems taking diabetes medications regularly?: No  Diabetes medication side effects?: No    Problem Solving:  Problem Solving Assessed Today: Yes              Reducing Risks:  Reducing Risks Assessed Today: Yes  Diabetes Risks: Age over 45 years, Sedentary Lifestyle, Hyperlipidemia, Family History  CAD Risks: Diabetes Mellitus, Hypertension, Male sex, Sedentary lifestyle, Tobacco exposure, Dyslipidemia, Obesity  Has dilated eye exam at least once a year?: Yes  Sees dentist every 6 months?: No  Feet checked by healthcare provider in the last year?: No    Healthy Coping:  Healthy Coping Assessed Today: Yes  Emotional response to diabetes: Ready to learn  Informal Support system:: None  Stage of change: PREPARATION (Decided to change - considering how)  Support resources: In-person Offerings  Patient Activation Measure Survey Score:  No flowsheet data found.      Care Plan and Education Provided:  Care Plan: Diabetes   Updates made by Margi Goldman RN since 3/4/2023 12:00 AM      Problem: HbA1C Not In Goal       Goal: Establish Regular Follow-Ups with PCP    This Visit's Progress: 30%      Task: Discuss with PCP the recommended timing for patient's next follow up visit(s) Completed 3/4/2023   Responsible User: Margi Goldman RN      Task: Discuss schedule for PCP visits with patient Completed 3/4/2023   Responsible User: Margi Goldman RN      Goal: Get HbA1C Level in Goal    This Visit's Progress: 40%   Note:    A1c not currently meeting ADA goal     Task:  Educate patient on diabetes education self-management topics Completed 3/4/2023   Responsible User: Margi Goldman RN      Task: Educate patient on benefits of regular glucose monitoring Completed 3/4/2023   Responsible User: Margi Goldman RN      Task: Refer patient to appropriate extended care team member, as needed (Medication Therapy Management, Behavioral Health, Physical Therapy, etc.)    Responsible User: Margi Goldman RN      Task: Discuss diabetes treatment plan with patient Completed 3/4/2023   Responsible User: Margi Goldman RN      Problem: Diabetes Self-Management Education Needed to Optimize Self-Care Behaviors       Goal: Understand diabetes pathophysiology and disease progression    This Visit's Progress: 100%      Task: Provide education on diabetes pathophysiology and disease progression specfic to patient's diabetes type Completed 3/4/2023   Responsible User: Margi Goldman RN      Goal: Healthy Eating - follow a healthy eating pattern for diabetes    This Visit's Progress: 50%      Task: Provide education on portion control and consistency in amount, composition and timing of food intake Completed 3/4/2023   Responsible User: Margi Goldman RN      Task: Provide education on managing carbohydrate intake (carbohydrate counting, plate planning method, etc.) Completed 3/4/2023   Responsible User: Margi Goldman RN      Task: Provide education on weight management    Responsible User: Margi Goldman RN      Task: Provide education on heart healthy eating    Responsible User: Margi Goldman RN      Task: Provide education on eating out Completed 3/4/2023   Responsible User: Margi Goldman RN      Task: Develop individualized healthy eating plan with patient Completed 3/4/2023   Responsible User: Margi Goldman RN      Goal: Being Active - get regular physical activity, working up to at least 150 minutes per week       Task: Provide education on relationship of  activity to glucose and precautions to take if at risk for low glucose    Responsible User: Margi Goldman RN      Task: Discuss barriers to physical activity with patient    Responsible User: Margi Goldman RN      Task: Develop physical activity plan with patient    Responsible User: Margi Goldman RN      Task: Explore community resources including walking groups, assistance programs, and home videos    Responsible User: Margi Goldman RN      Goal: Monitoring - monitor glucose and ketones as directed    This Visit's Progress: 60%   Note:    Information was provided today to patient on personal CGM     Task: Provide education on blood glucose monitoring (purpose, proper technique, frequency, glucose targets, interpreting results, when to use glucose control solution, sharps disposal)    Responsible User: Margi Goldman RN      Task: Provide education on continuous glucose monitoring (sensor placement, use of conner or /reader, understanding glucose trends, alerts and alarms, differences between sensor glucose and blood glucose) Completed 3/4/2023   Responsible User: Margi Goldman RN      Goal: Taking Medication - patient is consistently taking medications as directed    This Visit's Progress: 50%      Task: Provide education on action of prescribed medication, including when to take and possible side effects Completed 3/4/2023   Responsible User: Margi Goldman RN      Task: Provide education on insulin and injectable diabetes medications, including administration, storage, site selection and rotation for injection sites Completed 3/4/2023   Responsible User: Margi Goldman RN      Task: Discuss barriers to medication adherence with patient and provide management technique ideas as appropriate Completed 3/4/2023   Responsible User: Margi Goldman RN      Task: Provide education on frequency and refill details of medications Completed 3/4/2023   Responsible User: Margi Goldman  RN      Goal: Problem Solving - know how to prevent and manage short-term diabetes complications    This Visit's Progress: 30%      Task: Provide education on high blood glucose - causes, signs/symptoms, prevention and treatment Completed 3/4/2023   Responsible User: Margi Goldman RN      Task: Provide education on low blood glucose - causes, signs/symptoms, prevention, treatment, carrying a carbohydrate source at all times, and medical identification    Responsible User: Margi Goldman RN      Task: Provide education on safe travel with diabetes    Responsible User: Margi Goldman RN      Task: Provide education on how to care for diabetes on sick days    Responsible User: Margi Goldman RN      Task: Provide education on when to call a health care provider    Responsible User: Margi Goldman RN      Goal: Reducing Risks - know how to prevent and treat long-term diabetes complications    This Visit's Progress: 40%      Task: Provide education on major complications of diabetes, prevention, early diagnostic measures and treatment of complications Completed 3/4/2023   Responsible User: Margi Goldman RN      Task: Provide education on recommended care for dental, eye and foot health    Responsible User: Margi Goldman RN      Task: Provide education on Hemoglobin A1c - goals and relationship to blood glucose levels Completed 3/4/2023   Responsible User: Margi Goldman RN      Task: Provide education on recommendations for heart health - lipid levels and goals, blood pressure and goals, and aspirin therapy, if indicated    Responsible User: Margi Goldman RN      Task: Provide education on tobacco cessation Completed 3/4/2023   Responsible User: Margi Goldman RN      Goal: Healthy Coping - use available resources to cope with the challenges of managing diabetes    This Visit's Progress: 50%      Task: Discuss recognizing feelings about having diabetes Completed 3/4/2023   Responsible  User: Margi Goldman RN      Task: Provide education on the benefits of making appropriate lifestyle changes Completed 3/4/2023   Responsible User: Margi Goldman RN      Task: Provide education on benefits of utilizing support systems Completed 3/4/2023   Responsible User: Margi Goldman RN      Task: Discuss methods for coping with stress    Responsible User: Margi Goldman RN      Task: Provide education on when to seek professional counseling    Responsible User: Margi Goldman, RN          Thank you,  Margi Goldman RN [facility]  Certified Diabetes Care and   Visit type : DSMT        Time Spent: 60 minutes  Encounter Type: Individual    Any diabetes medication dose changes were made via the CDE Protocol per the patient's referring provider and primary care provider. A copy of this encounter was shared with the provider.     Much or all of the text in this note was generated through the use of the Dragon Dictate voice-to-text software.Errors in spelling or words which seem out of context are unintentional. Sound alike errors, in particular, may have escaped editing.

## 2023-03-01 NOTE — PATIENT INSTRUCTIONS
1. Eat 3 balanced meals each day - Monitor carb intake and limit to 60-75 grams per meal  This would be equal to 4-5 choices ~  1 choice = 15 grams    Do not wait longer than 4-5 hours to eat something  Snacks limit to no more than 30 grams of carbohydrates or 2 choices  Make sure you include protein source with each meal and at bedtime - this has been shown to help with blood glucose elevations    2. Check blood sugars 2  times each day  - when you first wake up before eating  - 2 hours AFTER dinner    Fasting and before meal target is 80 - 130   2 hours after a meal target is < 180  remember to bring meter and log book to all appointments    3. Incorporate 30 minutes activity into each day - does not need to be all at one time & walking counts    4. Take diabetes medications as prescribed 34 units of Lantus each day     Get the connre - Laura 2 downloaded on your phone, once you do that call me at [phone] and we will get you in to get things rolling

## 2023-03-05 NOTE — PROGRESS NOTES
Diabetes Self-Management Education & Support    Presents for: Individual review    Type of Service: In Person Visit    Assessment Type:   ASSESSMENT:  Donovan is a 72-year-old gentleman who comes to clinic today for consult in regards to his current diabetes self-management skills with an A1c not at ADA goal.  Review of the order placed by Dr. Chino indicated that this was a new diagnosis for him, however after speaking with him found out it was not and that he has had diabetes for approximately 10 years.  I did however learn through our conversation today that he had never received any type of formal education regarding diabetes or self-management so we ended up mutually deciding today that we would treat this as if it were a new diagnosis and he would be given information/education on the components included in an initial education visit.  Medications were reviewed with Donovan today and he reported he is taking 34 units of Lantus daily-typically midmorning.  States he does take daily with no missed or skipped doses.  Currently has not been checking his blood glucose on a regular basis, however he did check this morning and reported it returned at 210 mg/dL .  Because there is no consistency in his monitoring I asked him if he would be interested in starting a personal continuous glucose monitor-he indicated he would.  We unfortunately, were unable to successfully download the conner onto his phone so the initiation of this is temporarily postponed.   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  He lives with his son in a [facility].  He currently is a smoker, smoking cessation counseling was provided to him today and I strongly encouraged him to consider quitting.  Prior to our visit today his most recent office visit notes as well as lab results were reviewed.    Patient's most recent   Lab Results   Component Value Date    A1C 10.7 01/27/2023     is not meeting goal of  <7.0    Diabetes knowledge and skills assessment:   Patient is knowledgeable in diabetes management concepts related to: Taking Medication and Healthy Coping    Continue education with the following diabetes management concepts: Healthy Eating, Being Active, Monitoring, Taking Medication, Reducing Risks and Healthy Coping    Based on learning assessment above, most appropriate setting for further diabetes education would be: Individual setting.      PLAN  1. Eat 3 balanced meals each day - Monitor carb intake and limit to 60-75 grams per meal  This would be equal to 4-5 choices ~  1 choice = 15 grams    Do not wait longer than 4-5 hours to eat something  Snacks limit to no more than 30 grams of carbohydrates or 2 choices  Make sure you include protein source with each meal and at bedtime - this has been shown to help with blood glucose elevations    2. Check blood sugars 2  times each day  - when you first wake up before eating  - 2 hours AFTER dinner    Fasting and before meal target is 80 - 130   2 hours after a meal target is < 180  remember to bring meter and log book to all appointments    3. Incorporate 30 minutes activity into each day - does not need to be all at one time & walking counts    4. Take diabetes medications as prescribed 34 units of Lantus each day     Would like to get patient started with a personal CGM and had intentions of doing that in clinic today however were unable to access the conner on his phone for download.  Instructed him to get a hold of his phone carrier and have them assist him with downloading this conner- once this is done I instructed him to call me and we will put a sample sensor on him from clinic.    Topics to cover at upcoming visits: Healthy Eating, Being Active, Monitoring, Taking Medication, Reducing Risks and Healthy Coping    Follow-up: TBD-first need to get sensor on patient    See Care Plan for co-developed, patient-state behavior change goals.  AVS provided for patient  "today.    Education Materials Provided:  Living Healthy with Diabetes, Carbohydrate Counting and My Plate Planner      SUBJECTIVE/OBJECTIVE:  Presents for: Individual review  Accompanied by: Self  Diabetes education in the past 24 mo: No  Focus of Visit: Monitoring, Reducing Risks, Taking Medication, Healthy Coping, Healthy Eating, Problem Solving, Self-care behavioral goal setting, Assistance w/ making life changes, Diabetes Pathophysiology, Being Active  Diabetes type: Type 2  Date of diagnosis: > 5 yrs  Disease course: Getting harder to manage  How confident are you filling out medical forms by yourself:: Extremely  Transportation concerns: No  Difficulty affording diabetes medication?: No  Difficulty affording diabetes testing supplies?: No  Other concerns:: None  Cultural Influences/Ethnic Background:  Not  or       Diabetes Symptoms & Complications:  Fatigue: Yes  Neuropathy: Yes  Polydipsia: Sometimes  Polyphagia: Sometimes  Polyuria: Yes  Visual change: No  Slow healing wounds: Sometimes  Weight trend: Stable  Complications assessed today?: Yes  Autonomic neuropathy: Yes  CVA: No  Heart disease: No  Nephropathy: No  Peripheral neuropathy: Yes  Peripheral Vascular Disease: Yes  Retinopathy: No  Sexual dysfunction: Yes    Patient Problem List and Family Medical History reviewed for relevant medical history, current medical status, and diabetes risk factors.    Vitals:  Wt 94.2 kg (207 lb 9.6 oz)   BMI 33.26 kg/m    Estimated body mass index is 33.26 kg/m  as calculated from the following:    Height as of 1/27/23: 1.683 m (5' 6.25\").    Weight as of this encounter: 94.2 kg (207 lb 9.6 oz).   Last 3 BP:   BP Readings from Last 3 Encounters:   01/27/23 (!) 142/92   09/02/22 122/56   07/29/22 137/85       History   Smoking Status     Every Day     Packs/day: 1.00     Years: 40.00     Types: Cigarettes   Smokeless Tobacco     Never       Labs:  Lab Results   Component Value Date    A1C 10.7 " 01/27/2023     Lab Results   Component Value Date     01/27/2023     07/21/2022     06/20/2022     06/20/2022     Lab Results   Component Value Date     01/27/2023     Direct Measure HDL   Date Value Ref Range Status   01/27/2023 46 >=40 mg/dL Final   ]  GFR Estimate   Date Value Ref Range Status   01/27/2023 59 (L) >60 mL/min/1.73m2 Final     Comment:     eGFR calculated using 2021 CKD-EPI equation.   07/24/2018 >60 >60 mL/min/1.73m2 Final     GFR Estimate If Black   Date Value Ref Range Status   07/24/2018 >60 >60 mL/min/1.73m2 Final     Lab Results   Component Value Date    CR 1.29 01/27/2023     No results found for: MICROALBUMIN    Healthy Eating:  Healthy Eating Assessed Today: Yes  Cultural/Sikhism diet restrictions?: No  Meal planning/habits: Avoiding sweets, Heart healthy, Smaller portions  How many times a week on average do you eat food made away from home (restaurant/take-out)?: 2  Meals include: Breakfast, Dinner  Breakfast: 630 to 7 AM will have coffee black, around 10 AM will have typically 2 eggs with toast  Lunch: Skips  Dinner: 6 to 7 PM: + Protein + vegetable (corn)  Snacks: In the evening or before bed might have leftovers  Beverages: Water, Coffee, Juice, Diet soda, Milk (Milk is buttermilk, coffee is black)  Has patient met with a dietitian in the past?:  (unknown)    Being Active:  Being Active Assessed Today: Yes  Exercise:: Currently not exercising  Barrier to exercise: Physical limitation (Chronic back pain)    Monitoring:  Monitoring Assessed Today: Yes  Did patient bring glucose meter to appointment? : No  Blood Glucose Meter: Accu-chek  Times checking blood sugar at home (number): 1 - this is not consistent  Times checking blood sugar at home (per): Day  Blood glucose trend: Increasing    We had no blood glucose data available today for review    Taking Medications:  Diabetes Medication(s)     Insulin       insulin glargine (LANTUS PEN) 100 UNIT/ML  pen    Inject 35 Units Subcutaneous every morning          Taking Medication Assessed Today: Yes  Current Treatments: Insulin Injections  Dose schedule: Pre-breakfast  Given by: Patient  Injection/Infusion sites: Abdomen  Problems taking diabetes medications regularly?: No  Diabetes medication side effects?: No    Problem Solving:  Problem Solving Assessed Today: Yes              Reducing Risks:  Reducing Risks Assessed Today: Yes  Diabetes Risks: Age over 45 years, Sedentary Lifestyle, Hyperlipidemia, Family History  CAD Risks: Diabetes Mellitus, Hypertension, Male sex, Sedentary lifestyle, Tobacco exposure, Dyslipidemia, Obesity  Has dilated eye exam at least once a year?: Yes  Sees dentist every 6 months?: No  Feet checked by healthcare provider in the last year?: No    Healthy Coping:  Healthy Coping Assessed Today: Yes  Emotional response to diabetes: Ready to learn  Informal Support system:: None  Stage of change: PREPARATION (Decided to change - considering how)  Support resources: In-person Offerings  Patient Activation Measure Survey Score:  No flowsheet data found.      Care Plan and Education Provided:  Care Plan: Diabetes   Updates made by Margi Goldman RN since 3/4/2023 12:00 AM      Problem: HbA1C Not In Goal       Goal: Establish Regular Follow-Ups with PCP    This Visit's Progress: 30%      Task: Discuss with PCP the recommended timing for patient's next follow up visit(s) Completed 3/4/2023   Responsible User: Margi Goldman RN      Task: Discuss schedule for PCP visits with patient Completed 3/4/2023   Responsible User: Margi Goldman RN      Goal: Get HbA1C Level in Goal    This Visit's Progress: 40%   Note:    A1c not currently meeting ADA goal     Task: Educate patient on diabetes education self-management topics Completed 3/4/2023   Responsible User: Margi Goldman RN      Task: Educate patient on benefits of regular glucose monitoring Completed 3/4/2023   Responsible User:  Margi Goldman RN      Task: Refer patient to appropriate extended care team member, as needed (Medication Therapy Management, Behavioral Health, Physical Therapy, etc.)    Responsible User: Margi Goldman RN      Task: Discuss diabetes treatment plan with patient Completed 3/4/2023   Responsible User: Margi Goldman RN      Problem: Diabetes Self-Management Education Needed to Optimize Self-Care Behaviors       Goal: Understand diabetes pathophysiology and disease progression    This Visit's Progress: 100%      Task: Provide education on diabetes pathophysiology and disease progression specfic to patient's diabetes type Completed 3/4/2023   Responsible User: Margi Goldman RN      Goal: Healthy Eating - follow a healthy eating pattern for diabetes    This Visit's Progress: 50%      Task: Provide education on portion control and consistency in amount, composition and timing of food intake Completed 3/4/2023   Responsible User: Margi Goldman RN      Task: Provide education on managing carbohydrate intake (carbohydrate counting, plate planning method, etc.) Completed 3/4/2023   Responsible User: Margi Goldman RN      Task: Provide education on weight management    Responsible User: Margi Goldman RN      Task: Provide education on heart healthy eating    Responsible User: Margi Goldman RN      Task: Provide education on eating out Completed 3/4/2023   Responsible User: Margi Goldman RN      Task: Develop individualized healthy eating plan with patient Completed 3/4/2023   Responsible User: Margi Goldman RN      Goal: Being Active - get regular physical activity, working up to at least 150 minutes per week       Task: Provide education on relationship of activity to glucose and precautions to take if at risk for low glucose    Responsible User: Margi Goldman RN      Task: Discuss barriers to physical activity with patient    Responsible User: Margi Goldman RN      Task:  Develop physical activity plan with patient    Responsible User: Margi Goldman RN      Task: Explore community resources including walking groups, assistance programs, and home videos    Responsible User: Margi Goldman RN      Goal: Monitoring - monitor glucose and ketones as directed    This Visit's Progress: 60%   Note:    Information was provided today to patient on personal CGM     Task: Provide education on blood glucose monitoring (purpose, proper technique, frequency, glucose targets, interpreting results, when to use glucose control solution, sharps disposal)    Responsible User: Margi Goldman RN      Task: Provide education on continuous glucose monitoring (sensor placement, use of conner or /reader, understanding glucose trends, alerts and alarms, differences between sensor glucose and blood glucose) Completed 3/4/2023   Responsible User: Margi Godlman RN      Goal: Taking Medication - patient is consistently taking medications as directed    This Visit's Progress: 50%      Task: Provide education on action of prescribed medication, including when to take and possible side effects Completed 3/4/2023   Responsible User: Margi Goldman RN      Task: Provide education on insulin and injectable diabetes medications, including administration, storage, site selection and rotation for injection sites Completed 3/4/2023   Responsible User: Margi Goldman RN      Task: Discuss barriers to medication adherence with patient and provide management technique ideas as appropriate Completed 3/4/2023   Responsible User: Margi Goldman RN      Task: Provide education on frequency and refill details of medications Completed 3/4/2023   Responsible User: Margi Goldman RN      Goal: Problem Solving - know how to prevent and manage short-term diabetes complications    This Visit's Progress: 30%      Task: Provide education on high blood glucose - causes, signs/symptoms, prevention and treatment  Completed 3/4/2023   Responsible User: Margi Goldman RN      Task: Provide education on low blood glucose - causes, signs/symptoms, prevention, treatment, carrying a carbohydrate source at all times, and medical identification    Responsible User: Margi Goldman RN      Task: Provide education on safe travel with diabetes    Responsible User: Margi Goldman RN      Task: Provide education on how to care for diabetes on sick days    Responsible User: Margi Goldman RN      Task: Provide education on when to call a health care provider    Responsible User: Margi Goldman RN      Goal: Reducing Risks - know how to prevent and treat long-term diabetes complications    This Visit's Progress: 40%      Task: Provide education on major complications of diabetes, prevention, early diagnostic measures and treatment of complications Completed 3/4/2023   Responsible User: Margi Goldman RN      Task: Provide education on recommended care for dental, eye and foot health    Responsible User: Margi Goldman RN      Task: Provide education on Hemoglobin A1c - goals and relationship to blood glucose levels Completed 3/4/2023   Responsible User: Margi Goldman RN      Task: Provide education on recommendations for heart health - lipid levels and goals, blood pressure and goals, and aspirin therapy, if indicated    Responsible User: Margi Goldman RN      Task: Provide education on tobacco cessation Completed 3/4/2023   Responsible User: Margi Goldman RN      Goal: Healthy Coping - use available resources to cope with the challenges of managing diabetes    This Visit's Progress: 50%      Task: Discuss recognizing feelings about having diabetes Completed 3/4/2023   Responsible User: Margi Goldman RN      Task: Provide education on the benefits of making appropriate lifestyle changes Completed 3/4/2023   Responsible User: Margi Goldman RN      Task: Provide education on benefits of utilizing  support systems Completed 3/4/2023   Responsible User: Margi Goldman RN      Task: Discuss methods for coping with stress    Responsible User: Margi Goldman RN      Task: Provide education on when to seek professional counseling    Responsible User: Margi Goldman RN          Thank you,  Margi Goldman RN [facility]  Certified Diabetes Care and   Visit type : DSMT        Time Spent: 60 minutes  Encounter Type: Individual    Any diabetes medication dose changes were made via the CDE Protocol per the patient's referring provider and primary care provider. A copy of this encounter was shared with the provider.     Much or all of the text in this note was generated through the use of the Dragon Dictate voice-to-text software.Errors in spelling or words which seem out of context are unintentional. Sound alike errors, in particular, may have escaped editing.

## 2023-03-08 DIAGNOSIS — E11.69 TYPE 2 DIABETES MELLITUS WITH OTHER SPECIFIED COMPLICATION, WITH LONG-TERM CURRENT USE OF INSULIN (H): Primary | ICD-10-CM

## 2023-03-08 DIAGNOSIS — Z79.4 TYPE 2 DIABETES MELLITUS WITH OTHER SPECIFIED COMPLICATION, WITH LONG-TERM CURRENT USE OF INSULIN (H): Primary | ICD-10-CM

## 2023-03-08 DIAGNOSIS — F41.9 ANXIETY: ICD-10-CM

## 2023-03-09 RX ORDER — HYDROXYZINE HYDROCHLORIDE 25 MG/1
25 TABLET, FILM COATED ORAL EVERY 8 HOURS PRN
Qty: 90 TABLET | Refills: 1 | OUTPATIENT
Start: 2023-03-09

## 2023-03-22 ENCOUNTER — ALLIED HEALTH/NURSE VISIT (OUTPATIENT)
Dept: EDUCATION SERVICES | Facility: CLINIC | Age: 73
End: 2023-03-22
Payer: MEDICARE

## 2023-03-22 DIAGNOSIS — E11.69 TYPE 2 DIABETES MELLITUS WITH OTHER SPECIFIED COMPLICATION, WITH LONG-TERM CURRENT USE OF INSULIN (H): Primary | ICD-10-CM

## 2023-03-22 DIAGNOSIS — Z79.4 TYPE 2 DIABETES MELLITUS WITH OTHER SPECIFIED COMPLICATION, WITH LONG-TERM CURRENT USE OF INSULIN (H): Primary | ICD-10-CM

## 2023-03-22 PROCEDURE — 95249 CONT GLUC MNTR PT PROV EQP: CPT

## 2023-03-22 PROCEDURE — G0108 DIAB MANAGE TRN  PER INDIV: HCPCS

## 2023-03-22 NOTE — PATIENT INSTRUCTIONS
1. Eat 3 balanced meals each day - Monitor carb intake and limit to 60-75 grams per meal  This would be equal to 4-5 choices ~  1 choice = 15 grams    Do not wait longer than 4-5 hours to eat something  Snacks limit to no more than 30 grams of carbohydrates or 2 choices  Make sure you include protein source with each meal and at bedtime - this has been shown to help with blood glucose elevations    2. Check blood sugars several  times each day- scan scan scan !!   Fasting and before meal target is 80 - 130   2 hours after a meal target is < 180  remember to bring meter and log book to all appointments    3. Incorporate 30 minutes activity into each day - does not need to be all at one time & walking counts    4. Take diabetes medications as prescribed Continue with 35 units of insulin daily    Call [employer] - [phone]    Instructions:  1. The first hour after you place the sensor is the warm up period (black out period).  You will need to carry your blood glucose meter and check blood glucose during this time.    2. Check blood sugar if feeling symptoms of hypoglycemia but meter is not displaying a low number- may be some variability between sensor and meter at times.    3. Change sensor every 14 days.    4. Read/scan blood sugars every 8 hours to ensure entirety of data is available.  Tip: Try to scan minimum before each meal and bed.    5. Watch trend arrows- will let you know if blood sugars are rising, falling or stable at the time you check.    6. Laura 2 has alarms. You can adjust both the high and low blood glucose alarm (when they will go off) or turn off high blood glucose alarm if alarming too much.  You cannot turn off the critical low blood glucose alarm.    7. It is okay to shower, bathe, and swim (up to 3 feet deep for 30 minutes) with sensor. Do not get reader wet.    8. Remove the sensor if you need to have an MRI or CT scan.      9. Check the dose if you take a multivitamin or  Vitamin C (ascorbic acid). You want to limit daily intake of ascorbic acid to 500 mg/day or less, or it may falsely raise sensor glucose readings. This is more of a concern if you are on insulin or medication that can cause low blood glucose as you may miss a severe low glucose event.    Support:   If you have any trouble with use or if the sensor falls off early, call the customer service number for [employer] located on the sensor's box. They can often help troubleshoot or replace sensor if needed.  [employer] Customer Service: [phone]      Discount Programs:  [employer] Program (https://[email or web address].abbott/us-en/myfrHire An Esquiretyle.html)    To save on sensors, if told cost is more than $75: call [employer] Voucher line at [phone]     Software:    [employer] (www.LibreView.com)    Apps:  Laura 2 (Lets you scan blood glucose with your phone)  LibreLinLittle QuestUp (Lets you share blood glucose with others)

## 2023-03-22 NOTE — LETTER
3/22/2023         RE: Donovan Jane  [address] [facility] [address]        Dear Colleague,    Thank you for referring your patient, Donovan Jane, to the [facility]. Please see a copy of my visit note below.    Diabetes Self-Management Education & Support    Presents for: Personal CGM Start    Type of Service: In Person Visit    Assessment Type:   Patient completed homework (online training and/or Getting started with CGM guide)? :  (completed in clinic with pt today)  CGM Initial Settings: Freestyle Laura  Freestyle Laura Target Glucose Range (mg/dL):     Sensor was inserted with no resistance or bleeding at insertion site.    CGM-specific education:   Freestyle Laura sensor: insertion technique, sensor site location and rotation, insulin administration in relation to sensor placement, sensor wear, reasons to remove sensor (MRI, CT, diathermy), Vitamin C & Aspirin effects on sensor, Laura [place]: frequency of scanning sensor, length of time data is visible, use of built in glucose meter, Precision X-tra test strips, Use of trends and graphs for pattern management and problem solving and LibreView software         ASSESSMENT:  He was a very pleasant 72-year-old gentleman who comes to clinic today to receive education and instruction on the use of a personal CGM, specifically the laura 2.  We attempted to get started with this at his last visit but unfortunately he was unable to access the conner on his phone.  Since that time, he has successfully been able to install the conner, but also has received his first sensor order.  I assisted him with programming all the information that was necessary and had him do the actual application of the sensor, which he placed on his left upper arm.  He was agreeable to having it linked to the clinic so we would have the ability to monitor remotely.    Pt verbalized understanding of concepts discussed and recommendations provided Monitoring,  Taking Medication and Reducing Risks today.    Continue education with the following diabetes management concepts:     PLAN  1. Eat 3 balanced meals each day - Monitor carb intake and limit to 60-75 grams per meal  This would be equal to 4-5 choices ~  1 choice = 15 grams    Do not wait longer than 4-5 hours to eat something  Snacks limit to no more than 30 grams of carbohydrates or 2 choices  Make sure you include protein source with each meal and at bedtime - this has been shown to help with blood glucose elevations    2. Check blood sugars several  times each day- Encouraged him to scan frequently   Fasting and before meal target is 80 - 130   2 hours after a meal target is < 180  remember to bring meter and log book to all appointments    3. Incorporate 30 minutes activity into each day - does not need to be all at one time & walking counts    4. Take diabetes medications as prescribed Continue with 35 units of insulin daily    Call [employer] [place] - [phone]    Instructions:  1. The first hour after you place the sensor is the warm up period (black out period).  You will need to carry your blood glucose meter and check blood glucose during this time.    2. Check blood sugar if feeling symptoms of hypoglycemia but meter is not displaying a low number- may be some variability between sensor and meter at times.    3. Change sensor every 14 days.    4. Read/scan blood sugars every 8 hours to ensure entirety of data is available.  Tip: Try to scan minimum before each meal and bed.    5. Watch trend arrows- will let you know if blood sugars are rising, falling or stable at the time you check.    6. Laura 2 has alarms. You can adjust both the high and low blood glucose alarm (when they will go off) or turn off high blood glucose alarm if alarming too much.  You cannot turn off the critical low blood glucose alarm.    7. It is okay to shower, bathe, and swim (up to 3 feet deep for 30 minutes) with sensor.  Do not get reader wet.    8. Remove the sensor if you need to have an MRI or CT scan.      9. Check the dose if you take a multivitamin or Vitamin C (ascorbic acid). You want to limit daily intake of ascorbic acid to 500 mg/day or less, or it may falsely raise sensor glucose readings. This is more of a concern if you are on insulin or medication that can cause low blood glucose as you may miss a severe low glucose event.    Support:   If you have any trouble with use or if the sensor falls off early, call the customer service number for Laura located on the sensor's box. They can often help troubleshoot or replace sensor if needed.  Laura Customer Service: [phone]      Discount Programs:  My [employer] Program ([email or web address]/my [email or web address])    To save on sensors, if told cost is more than $75: call [employer] line at [phone]     Software:    App.io ([email or web address])    Apps:  Laura 2 (Lets you scan blood glucose with your phone)  LibrAibo (Lets you share blood glucose with others)        Topics to cover at upcoming visits: Healthy Eating, Being Active, Monitoring, Taking Medication, Reducing Risks and Healthy Coping    Follow-up: TBD    See Care Plan for co-developed, patient-state behavior change goals.  AVS provided for patient today.    Education Materials Provided:  Freestyle Laura 2  meter kit      SUBJECTIVE/OBJECTIVE:  Presents for: Personal CGM Start  Accompanied by: Self  Diabetes education in the past 24 mo: Yes  Focus of Visit: Monitoring, CGM, Problem Solving  Type of CGM visit: Personal CGM Start  Diabetes type: Type 2  Date of diagnosis: > 5 yrs  Disease course: Getting harder to manage  How confident are you filling out medical forms by yourself:: Extremely  Transportation concerns: No  Difficulty affording diabetes medication?: No  Difficulty affording diabetes testing supplies?: No  Other concerns:: None  Cultural Influences/Ethnic Background:  Not  " or       Diabetes Symptoms & Complications:  Fatigue: Yes  Neuropathy: Yes  Polydipsia: Sometimes  Polyphagia: Sometimes  Polyuria: Yes  Visual change: No  Slow healing wounds: Sometimes  Weight trend: Stable  Complications assessed today?: Yes  Autonomic neuropathy: Yes  CVA: No  Heart disease: No  Nephropathy: No  Peripheral neuropathy: Yes  Peripheral Vascular Disease: Yes  Retinopathy: No  Sexual dysfunction: Yes    Patient Problem List and Family Medical History reviewed for relevant medical history, current medical status, and diabetes risk factors.    Vitals:  There were no vitals taken for this visit.  Estimated body mass index is 33.26 kg/m  as calculated from the following:    Height as of 1/27/23: 1.683 m (5' 6.25\").    Weight as of 3/1/23: 94.2 kg (207 lb 9.6 oz).   Last 3 BP:   BP Readings from Last 3 Encounters:   01/27/23 (!) 142/92   09/02/22 122/56   07/29/22 137/85       History   Smoking Status     Every Day     Packs/day: 1.00     Years: 40.00     Types: Cigarettes   Smokeless Tobacco     Never       Labs:  Lab Results   Component Value Date    A1C 10.7 01/27/2023     Lab Results   Component Value Date     01/27/2023     07/21/2022     06/20/2022     06/20/2022     Lab Results   Component Value Date     01/27/2023     Direct Measure HDL   Date Value Ref Range Status   01/27/2023 46 >=40 mg/dL Final   ]  GFR Estimate   Date Value Ref Range Status   01/27/2023 59 (L) >60 mL/min/1.73m2 Final     Comment:     eGFR calculated using 2021 CKD-EPI equation.   07/24/2018 >60 >60 mL/min/1.73m2 Final     GFR Estimate If Black   Date Value Ref Range Status   07/24/2018 >60 >60 mL/min/1.73m2 Final     Lab Results   Component Value Date    CR 1.29 01/27/2023     No results found for: MICROALBUMIN    Healthy Eating:  Healthy Eating Assessed Today: No  Has patient met with a dietitian in the past?:  (unknown)    Being Active:  Barrier to exercise: Physical " limitation (Chronic back pain)    Monitoring:  Monitoring Assessed Today: Yes  Blood Glucose Meter: CGM  Times checking blood sugar at home (number): 1  Blood glucose trend: Increasing        Taking Medications:  Diabetes Medication(s)     Insulin       insulin glargine (LANTUS PEN) 100 UNIT/ML pen    Inject 35 Units Subcutaneous every morning          Current Treatments: Insulin Injections  Dose schedule: Pre-breakfast  Given by: Patient  Injection/Infusion sites: Abdomen  Problems taking diabetes medications regularly?: No  Diabetes medication side effects?: No    Problem Solving:  Problem Solving Assessed Today: Yes  Is the patient at risk for hypoglycemia?: No              Reducing Risks:  Reducing Risks Assessed Today: Yes  Diabetes Risks: Age over 45 years, Sedentary Lifestyle, Hyperlipidemia, Family History  CAD Risks: Diabetes Mellitus, Hypertension, Male sex, Sedentary lifestyle, Tobacco exposure, Dyslipidemia, Obesity  Has dilated eye exam at least once a year?: Yes  Sees dentist every 6 months?: No  Feet checked by healthcare provider in the last year?: No    Healthy Coping:  Healthy Coping Assessed Today: Yes  Emotional response to diabetes: Ready to learn  Informal Support system:: None  Stage of change: PREPARATION (Decided to change - considering how)  Support resources: In-person Offerings  Patient Activation Measure Survey Score:  No flowsheet data found.      Care Plan and Education Provided:  Patient was instructed on freestyle Laura 2 personal CGM     Care Plan: Diabetes   Updates made by Margi Goldman RN since 3/23/2023 12:00 AM      Problem: HbA1C Not In Goal       Goal: Get HbA1C Level in Goal    This Visit's Progress: 20%   Recent Progress: 40%   Note:    A1c not currently meeting ADA goal     Problem: Diabetes Self-Management Education Needed to Optimize Self-Care Behaviors       Goal: Monitoring - monitor glucose and ketones as directed    This Visit's Progress: 90%   Recent Progress:  60%   Note:    Education provided today for personal CGM Laura 2     Goal: Taking Medication - patient is consistently taking medications as directed    This Visit's Progress: 100%   Recent Progress: 50%   Note:    Taking insulin with no missed or skipped doses         Thank you,  Margi Madrid RN [facility]  Certified Diabetes Care and   Visit type : DSMT        Time Spent: 30 minutes  Encounter Type: Individual    Any diabetes medication dose changes were made via the CDE Protocol per the patient's referring provider and primary care provider. A copy of this encounter was shared with the provider.     Much or all of the text in this note was generated through the use of the Dragon Dictate voice-to-text software.Errors in spelling or words which seem out of context are unintentional. Sound alike errors, in particular, may have escaped editing.

## 2023-03-23 NOTE — PROGRESS NOTES
Diabetes Self-Management Education & Support    Presents for: Personal CGM Start    Type of Service: In Person Visit    Assessment Type:   Patient completed homework (online training and/or Getting started with CGM guide)? :  (completed in clinic with pt today)  CGM Initial Settings: Freestyle Laura  Freestyle Laura Target Glucose Range (mg/dL):     Sensor was inserted with no resistance or bleeding at insertion site.    CGM-specific education:   Freestyle Laura sensor: insertion technique, sensor site location and rotation, insulin administration in relation to sensor placement, sensor wear, reasons to remove sensor (MRI, CT, diathermy), Vitamin C & Aspirin effects on sensor, Laura [place]: frequency of scanning sensor, length of time data is visible, use of built in glucose meter, Precision X-tra test strips, Use of trends and graphs for pattern management and problem solving and LibreView software         ASSESSMENT:  He was a very pleasant 72-year-old gentleman who comes to clinic today to receive education and instruction on the use of a personal CGM, specifically the laura 2.  We attempted to get started with this at his last visit but unfortunately he was unable to access the conner on his phone.  Since that time, he has successfully been able to install the conner, but also has received his first sensor order.  I assisted him with programming all the information that was necessary and had him do the actual application of the sensor, which he placed on his left upper arm.  He was agreeable to having it linked to the clinic so we would have the ability to monitor remotely.    Pt verbalized understanding of concepts discussed and recommendations provided Monitoring, Taking Medication and Reducing Risks today.    Continue education with the following diabetes management concepts:     PLAN  1. Eat 3 balanced meals each day - Monitor carb intake and limit to 60-75 grams per meal  This would be equal to 4-5 choices  ~  1 choice = 15 grams    Do not wait longer than 4-5 hours to eat something  Snacks limit to no more than 30 grams of carbohydrates or 2 choices  Make sure you include protein source with each meal and at bedtime - this has been shown to help with blood glucose elevations    2. Check blood sugars several  times each day- Encouraged him to scan frequently   Fasting and before meal target is 80 - 130   2 hours after a meal target is < 180  remember to bring meter and log book to all appointments    3. Incorporate 30 minutes activity into each day - does not need to be all at one time & walking counts    4. Take diabetes medications as prescribed Continue with 35 units of insulin daily    Call [employer] [place] - [phone]    Instructions:  1. The first hour after you place the sensor is the warm up period (black out period).  You will need to carry your blood glucose meter and check blood glucose during this time.    2. Check blood sugar if feeling symptoms of hypoglycemia but meter is not displaying a low number- may be some variability between sensor and meter at times.    3. Change sensor every 14 days.    4. Read/scan blood sugars every 8 hours to ensure entirety of data is available.  Tip: Try to scan minimum before each meal and bed.    5. Watch trend arrows- will let you know if blood sugars are rising, falling or stable at the time you check.    6. Laura 2 has alarms. You can adjust both the high and low blood glucose alarm (when they will go off) or turn off high blood glucose alarm if alarming too much.  You cannot turn off the critical low blood glucose alarm.    7. It is okay to shower, bathe, and swim (up to 3 feet deep for 30 minutes) with sensor. Do not get reader wet.    8. Remove the sensor if you need to have an MRI or CT scan.      9. Check the dose if you take a multivitamin or Vitamin C (ascorbic acid). You want to limit daily intake of ascorbic acid to 500 mg/day or less, or it may  falsely raise sensor glucose readings. This is more of a concern if you are on insulin or medication that can cause low blood glucose as you may miss a severe low glucose event.    Support:   If you have any trouble with use or if the sensor falls off early, call the customer service number for Laura located on the sensor's box. They can often help troubleshoot or replace sensor if needed.  Laura Customer Service: [phone]      Discount Programs:  My [employer] Program ([email or web address] [email or web address])    To save on sensors, if told cost is more than $75: call [employer] line at [phone]     Software:    [employer] ([email or web address])    Apps:  Laura 2 (Lets you scan blood glucose with your phone)  [email or web address] (Lets you share blood glucose with others)        Topics to cover at upcoming visits: Healthy Eating, Being Active, Monitoring, Taking Medication, Reducing Risks and Healthy Coping    Follow-up: TBD    See Care Plan for co-developed, patient-state behavior change goals.  AVS provided for patient today.    Education Materials Provided:  Freestyle Laura 2  meter kit      SUBJECTIVE/OBJECTIVE:  Presents for: Personal CGM Start  Accompanied by: Self  Diabetes education in the past 24 mo: Yes  Focus of Visit: Monitoring, CGM, Problem Solving  Type of CGM visit: Personal CGM Start  Diabetes type: Type 2  Date of diagnosis: > 5 yrs  Disease course: Getting harder to manage  How confident are you filling out medical forms by yourself:: Extremely  Transportation concerns: No  Difficulty affording diabetes medication?: No  Difficulty affording diabetes testing supplies?: No  Other concerns:: None  Cultural Influences/Ethnic Background:  Not  or       Diabetes Symptoms & Complications:  Fatigue: Yes  Neuropathy: Yes  Polydipsia: Sometimes  Polyphagia: Sometimes  Polyuria: Yes  Visual change: No  Slow healing wounds: Sometimes  Weight trend: Stable  Complications assessed  "today?: Yes  Autonomic neuropathy: Yes  CVA: No  Heart disease: No  Nephropathy: No  Peripheral neuropathy: Yes  Peripheral Vascular Disease: Yes  Retinopathy: No  Sexual dysfunction: Yes    Patient Problem List and Family Medical History reviewed for relevant medical history, current medical status, and diabetes risk factors.    Vitals:  There were no vitals taken for this visit.  Estimated body mass index is 33.26 kg/m  as calculated from the following:    Height as of 1/27/23: 1.683 m (5' 6.25\").    Weight as of 3/1/23: 94.2 kg (207 lb 9.6 oz).   Last 3 BP:   BP Readings from Last 3 Encounters:   01/27/23 (!) 142/92   09/02/22 122/56   07/29/22 137/85       History   Smoking Status     Every Day     Packs/day: 1.00     Years: 40.00     Types: Cigarettes   Smokeless Tobacco     Never       Labs:  Lab Results   Component Value Date    A1C 10.7 01/27/2023     Lab Results   Component Value Date     01/27/2023     07/21/2022     06/20/2022     06/20/2022     Lab Results   Component Value Date     01/27/2023     Direct Measure HDL   Date Value Ref Range Status   01/27/2023 46 >=40 mg/dL Final   ]  GFR Estimate   Date Value Ref Range Status   01/27/2023 59 (L) >60 mL/min/1.73m2 Final     Comment:     eGFR calculated using 2021 CKD-EPI equation.   07/24/2018 >60 >60 mL/min/1.73m2 Final     GFR Estimate If Black   Date Value Ref Range Status   07/24/2018 >60 >60 mL/min/1.73m2 Final     Lab Results   Component Value Date    CR 1.29 01/27/2023     No results found for: MICROALBUMIN    Healthy Eating:  Healthy Eating Assessed Today: No  Has patient met with a dietitian in the past?:  (unknown)    Being Active:  Barrier to exercise: Physical limitation (Chronic back pain)    Monitoring:  Monitoring Assessed Today: Yes  Blood Glucose Meter: CGM  Times checking blood sugar at home (number): 1  Blood glucose trend: Increasing        Taking Medications:  Diabetes Medication(s)     Insulin       " insulin glargine (LANTUS PEN) 100 UNIT/ML pen    Inject 35 Units Subcutaneous every morning          Current Treatments: Insulin Injections  Dose schedule: Pre-breakfast  Given by: Patient  Injection/Infusion sites: Abdomen  Problems taking diabetes medications regularly?: No  Diabetes medication side effects?: No    Problem Solving:  Problem Solving Assessed Today: Yes  Is the patient at risk for hypoglycemia?: No              Reducing Risks:  Reducing Risks Assessed Today: Yes  Diabetes Risks: Age over 45 years, Sedentary Lifestyle, Hyperlipidemia, Family History  CAD Risks: Diabetes Mellitus, Hypertension, Male sex, Sedentary lifestyle, Tobacco exposure, Dyslipidemia, Obesity  Has dilated eye exam at least once a year?: Yes  Sees dentist every 6 months?: No  Feet checked by healthcare provider in the last year?: No    Healthy Coping:  Healthy Coping Assessed Today: Yes  Emotional response to diabetes: Ready to learn  Informal Support system:: None  Stage of change: PREPARATION (Decided to change - considering how)  Support resources: In-person Offerings  Patient Activation Measure Survey Score:  No flowsheet data found.      Care Plan and Education Provided:  Patient was instructed on freestyle Laura 2 personal CGM     Care Plan: Diabetes   Updates made by Margi Goldman RN since 3/23/2023 12:00 AM      Problem: HbA1C Not In Goal       Goal: Get HbA1C Level in Goal    This Visit's Progress: 20%   Recent Progress: 40%   Note:    A1c not currently meeting ADA goal     Problem: Diabetes Self-Management Education Needed to Optimize Self-Care Behaviors       Goal: Monitoring - monitor glucose and ketones as directed    This Visit's Progress: 90%   Recent Progress: 60%   Note:    Education provided today for personal CGM Laura 2     Goal: Taking Medication - patient is consistently taking medications as directed    This Visit's Progress: 100%   Recent Progress: 50%   Note:    Taking insulin with no missed or skipped  doses         Thank you,  Margi Madrid RN [facility]  Certified Diabetes Care and   Visit type : DSMT        Time Spent: 30 minutes  Encounter Type: Individual    Any diabetes medication dose changes were made via the CDE Protocol per the patient's referring provider and primary care provider. A copy of this encounter was shared with the provider.     Much or all of the text in this note was generated through the use of the Dragon Dictate voice-to-text software.Errors in spelling or words which seem out of context are unintentional. Sound alike errors, in particular, may have escaped editing.

## 2023-04-05 DIAGNOSIS — J31.0 CHRONIC RHINITIS: ICD-10-CM

## 2023-04-06 RX ORDER — FLUTICASONE PROPIONATE 50 MCG
1 SPRAY, SUSPENSION (ML) NASAL DAILY
Qty: 11.1 ML | Refills: 1 | Status: SHIPPED | OUTPATIENT
Start: 2023-04-06

## 2023-04-06 NOTE — TELEPHONE ENCOUNTER
"Last Written Prescription Date:  2/6/2023  Last Fill Quantity: 11.1ml,  # refills: 1   Last office visit provider:  1/27/2023 with PCP      Requested Prescriptions   Pending Prescriptions Disp Refills     fluticasone (FLONASE) 50 MCG/ACT nasal spray 11.1 mL 1     Sig: Spray 1 spray into both nostrils daily       Nasal Allergy Protocol Passed - 4/5/2023 10:36 AM        Passed - Patient is age 12 or older        Passed - Recent (12 mo) or future (30 days) visit within the authorizing provider's specialty     Patient has had an office visit with the authorizing provider or a provider within the authorizing providers department within the previous 12 mos or has a future within next 30 days. See \"Patient Info\" tab in inbasket, or \"Choose Columns\" in Meds & Orders section of the refill encounter.              Passed - Medication is active on med list             Kalyn Gutierres RN 04/06/23 8:40 AM  "

## 2023-04-21 DIAGNOSIS — F41.9 ANXIETY: ICD-10-CM

## 2023-04-22 RX ORDER — HYDROXYZINE HYDROCHLORIDE 25 MG/1
25 TABLET, FILM COATED ORAL EVERY 8 HOURS PRN
Qty: 270 TABLET | Refills: 3 | Status: SHIPPED | OUTPATIENT
Start: 2023-04-22

## 2023-04-23 NOTE — TELEPHONE ENCOUNTER
"Last Written Prescription Date:  2/6/2023  Last Fill Quantity: 90,  # refills: 1   Last office visit provider: 1/27/2023 with PCP Dr FARIBA Chino     Requested Prescriptions   Pending Prescriptions Disp Refills     hydrOXYzine (ATARAX) 25 MG tablet 90 tablet 1     Sig: Take 1 tablet (25 mg) by mouth every 8 hours as needed for itching or anxiety       Antihistamines Protocol Passed - 4/21/2023  5:03 PM        Passed - Recent (12 mo) or future (30 days) visit within the authorizing provider's specialty     Patient has had an office visit with the authorizing provider or a provider within the authorizing providers department within the previous 12 mos or has a future within next 30 days. See \"Patient Info\" tab in inbasket, or \"Choose Columns\" in Meds & Orders section of the refill encounter.              Passed - Patient is age 3 or older     Apply age and/or weight-based dosing for peds patients age 3 and older.    Forward request to provider for patients under the age of 3.          Passed - Medication is active on med list             Nakia Velez RN 04/22/23 10:50 PM  "

## 2023-05-31 ENCOUNTER — TELEPHONE (OUTPATIENT)
Dept: EDUCATION SERVICES | Facility: CLINIC | Age: 73
End: 2023-05-31
Payer: MEDICARE

## 2023-05-31 DIAGNOSIS — E11.65 POORLY CONTROLLED DIABETES MELLITUS (H): ICD-10-CM

## 2023-06-26 DIAGNOSIS — R79.0 LOW MAGNESIUM LEVEL: ICD-10-CM

## 2023-06-26 DIAGNOSIS — M54.50 CHRONIC BILATERAL LOW BACK PAIN, UNSPECIFIED WHETHER SCIATICA PRESENT: ICD-10-CM

## 2023-06-26 DIAGNOSIS — G89.29 CHRONIC BILATERAL LOW BACK PAIN, UNSPECIFIED WHETHER SCIATICA PRESENT: ICD-10-CM

## 2023-06-27 RX ORDER — DULOXETIN HYDROCHLORIDE 60 MG/1
120 CAPSULE, DELAYED RELEASE ORAL DAILY
Qty: 180 CAPSULE | Refills: 1 | OUTPATIENT
Start: 2023-06-27

## 2023-06-27 RX ORDER — UREA 10 %
500 LOTION (ML) TOPICAL 2 TIMES DAILY
Qty: 180 TABLET | Refills: 1 | Status: SHIPPED | OUTPATIENT
Start: 2023-06-27

## 2023-06-27 NOTE — TELEPHONE ENCOUNTER
"Routing refill request to provider for review/approval because:  BP not in range.  Early refill requested.    Last Written Prescription Date:  1/27/23  Last Fill Quantity: 180,  # refills: 1   Last office visit provider:  1/27/23     Requested Prescriptions   Pending Prescriptions Disp Refills     magnesium gluconate (MAGONATE) 500 (27 Mg) MG tablet 180 tablet 1     Sig: Take 1 tablet (500 mg) by mouth 2 times daily       There is no refill protocol information for this order      Refused Prescriptions Disp Refills     DULoxetine (CYMBALTA) 60 MG capsule 180 capsule 1     Sig: Take 2 capsules (120 mg) by mouth daily       Serotonin-Norepinephrine Reuptake Inhibitors  Failed - 6/26/2023  5:32 PM        Failed - Blood pressure under 140/90 in past 12 months     BP Readings from Last 3 Encounters:   01/27/23 (!) 142/92   09/02/22 122/56   07/29/22 137/85                 Passed - Recent (12 mo) or future (30 days) visit within the authorizing provider's specialty     Patient has had an office visit with the authorizing provider or a provider within the authorizing providers department within the previous 12 mos or has a future within next 30 days. See \"Patient Info\" tab in inbasket, or \"Choose Columns\" in Meds & Orders section of the refill encounter.              Passed - Medication is active on med list        Passed - Patient is age 18 or older             Karthikeyan Ruiz RN 06/27/23 9:45 AM  "

## 2023-09-25 DIAGNOSIS — M54.50 CHRONIC BILATERAL LOW BACK PAIN, UNSPECIFIED WHETHER SCIATICA PRESENT: ICD-10-CM

## 2023-09-25 DIAGNOSIS — G89.29 CHRONIC BILATERAL LOW BACK PAIN, UNSPECIFIED WHETHER SCIATICA PRESENT: ICD-10-CM

## 2023-09-26 RX ORDER — DULOXETIN HYDROCHLORIDE 60 MG/1
120 CAPSULE, DELAYED RELEASE ORAL DAILY
Qty: 180 CAPSULE | Refills: 1 | Status: SHIPPED | OUTPATIENT
Start: 2023-09-26

## 2023-09-28 ENCOUNTER — TELEPHONE (OUTPATIENT)
Dept: FAMILY MEDICINE | Facility: CLINIC | Age: 73
End: 2023-09-28
Payer: MEDICARE

## 2023-10-02 NOTE — TELEPHONE ENCOUNTER
Central Prior Authorization Team   Phone: [phone]    PA Initiation    Medication: DULOXETINE HCL 60 MG PO CSDR  Insurance Company: [employer] Clinical Review - Phone [phone] Fax [phone]  Pharmacy Filling the Rx: [id number] [place] [address]  Filling Pharmacy Phone: [phone]  Filling Pharmacy Fax:    Start Date: 10/2/2023    Started PA on CMM and a response of Prior Authorization not required for patient/medication.  Called pharmacy, they state that this medication is actually going through Work Comp.  PA is not needed

## 2023-11-14 ENCOUNTER — PATIENT OUTREACH (OUTPATIENT)
Dept: FAMILY MEDICINE | Facility: CLINIC | Age: 73
End: 2023-11-14
Payer: MEDICARE

## 2023-11-14 NOTE — LETTER
Donovan Jane  [address] [facility] [address]               Donovan, you are due for a diabetic check with Dr. Chino. Please contact our clinic to get scheduled.       Thank you!

## 2024-01-02 ENCOUNTER — PATIENT OUTREACH (OUTPATIENT)
Dept: GASTROENTEROLOGY | Facility: CLINIC | Age: 74
End: 2024-01-02
Payer: MEDICARE

## 2024-03-26 ENCOUNTER — PATIENT OUTREACH (OUTPATIENT)
Dept: FAMILY MEDICINE | Facility: CLINIC | Age: 74
End: 2024-03-26
Payer: MEDICARE

## 2024-03-26 NOTE — TELEPHONE ENCOUNTER
Patient Quality Outreach    Patient is due for the following:   Physical Annual Wellness Visit    Next Steps:   Schedule a Annual Wellness Visit    Type of outreach:    Phone, spoke to patient/parent. Patient established care elsewhere      Questions for provider review:    None           Azucena Simms MA

## 2024-03-26 NOTE — LETTER
Juanjose Man,  In reviewing you chart it has been over a year since your last annual wellness visit. *** is scheduling out a few months. Please let us know if you need assistance with scheduling. Otherwise, you can schedule an appt through [facility] or by calling [phone].    Thanks!  [facility]

## (undated) DEVICE — BLADE KNIFE SURG 15 371115

## (undated) DEVICE — CATH IV 16GA 30MM VIALON AUTOGUARD 382554

## (undated) DEVICE — SU VICRYL 3-0 RB-1 27" J305H

## (undated) DEVICE — CUSTOM PACK LUMBAR FUSION SNE5BLFHEA

## (undated) DEVICE — GLOVE BIOGEL PI ULTRATOUCH G SZ 8.0 42180

## (undated) DEVICE — DRSG PRIMAPORE 02X3"

## (undated) DEVICE — CUSHION INSERT LG PRONE VIEW JACKSON TABLE

## (undated) DEVICE — GLOVE UNDER INDICATOR PI SZ 8.5 LF 41685

## (undated) DEVICE — ESU ELEC BLADE 6" COATED E1450-6

## (undated) DEVICE — DRAPE C-ARM 60X42" 1013

## (undated) DEVICE — WRAP LUMBAR COMPRESS COLD THERAPY 4632P

## (undated) DEVICE — SUCTION MANIFOLD NEPTUNE 2 SYS 1 PORT 702-025-000

## (undated) DEVICE — GOWN SURGICAL SMARTGOWN 2XL 89075

## (undated) DEVICE — TAPE ADH POROUS 2IN CURITY STD 6613

## (undated) DEVICE — SOL NACL 0.9% IRRIG 1000ML BOTTLE 2F7124

## (undated) DEVICE — SOL WATER IRRIG 1000ML BOTTLE 2F7114

## (undated) DEVICE — TOOL DISSECT MIDAS MR8 12CM TELESC MATCH 2.5 MR8-T12MH25

## (undated) DEVICE — DRAPE SHEET REV FOLD 3/4 9349

## (undated) DEVICE — SUTURE VICRYL+ 4-0 UNDYED PS-2 VCP496H

## (undated) DEVICE — Device

## (undated) DEVICE — CATH TRAY FOLEY SURESTEP 16FR DRAIN BAG STATOCK A899916

## (undated) DEVICE — PREP CHLORAPREP 26ML TINTED HI-LITE ORANGE 930815

## (undated) DEVICE — SU VICRYL 3-0 CT-2 27" UND J232H

## (undated) DEVICE — PLATE GROUNDING ADULT W/CORD 9165L

## (undated) DEVICE — ESU PENCIL SMOKE EVAC W/ROCKER SWITCH 0703-047-000

## (undated) DEVICE — POSITIONER ARM CRADLE LAMINECTOMY DISP

## (undated) DEVICE — DRSG GAUZE 2X2" 8042

## (undated) RX ORDER — BACITRACIN ZINC 500 [USP'U]/G
OINTMENT TOPICAL
Status: DISPENSED
Start: 2022-07-21

## (undated) RX ORDER — DEXAMETHASONE SODIUM PHOSPHATE 10 MG/ML
INJECTION INTRAMUSCULAR; INTRAVENOUS
Status: DISPENSED
Start: 2022-07-21

## (undated) RX ORDER — PROPOFOL 10 MG/ML
INJECTION, EMULSION INTRAVENOUS
Status: DISPENSED
Start: 2022-07-21

## (undated) RX ORDER — FENTANYL CITRATE-0.9 % NACL/PF 10 MCG/ML
PLASTIC BAG, INJECTION (ML) INTRAVENOUS
Status: DISPENSED
Start: 2022-07-21

## (undated) RX ORDER — ONDANSETRON 2 MG/ML
INJECTION INTRAMUSCULAR; INTRAVENOUS
Status: DISPENSED
Start: 2022-07-21

## (undated) RX ORDER — FENTANYL CITRATE 50 UG/ML
INJECTION, SOLUTION INTRAMUSCULAR; INTRAVENOUS
Status: DISPENSED
Start: 2022-07-21

## (undated) RX ORDER — LIDOCAINE HYDROCHLORIDE 10 MG/ML
INJECTION, SOLUTION EPIDURAL; INFILTRATION; INTRACAUDAL; PERINEURAL
Status: DISPENSED
Start: 2022-07-21